# Patient Record
Sex: FEMALE | Race: WHITE | NOT HISPANIC OR LATINO | Employment: FULL TIME | URBAN - NONMETROPOLITAN AREA
[De-identification: names, ages, dates, MRNs, and addresses within clinical notes are randomized per-mention and may not be internally consistent; named-entity substitution may affect disease eponyms.]

---

## 2018-08-08 ENCOUNTER — ANESTHESIA (OUTPATIENT)
Dept: PERIOP | Facility: HOSPITAL | Age: 33
End: 2018-08-08

## 2018-08-08 ENCOUNTER — APPOINTMENT (OUTPATIENT)
Dept: GENERAL RADIOLOGY | Facility: HOSPITAL | Age: 33
End: 2018-08-08

## 2018-08-08 ENCOUNTER — ANESTHESIA EVENT (OUTPATIENT)
Dept: PERIOP | Facility: HOSPITAL | Age: 33
End: 2018-08-08

## 2018-08-08 ENCOUNTER — HOSPITAL ENCOUNTER (INPATIENT)
Facility: HOSPITAL | Age: 33
LOS: 7 days | Discharge: HOME | End: 2018-08-15
Attending: EMERGENCY MEDICINE | Admitting: INTERNAL MEDICINE

## 2018-08-08 DIAGNOSIS — L03.116 CELLULITIS OF LEFT ANKLE: ICD-10-CM

## 2018-08-08 DIAGNOSIS — R73.9 HYPERGLYCEMIA: ICD-10-CM

## 2018-08-08 DIAGNOSIS — A41.9 SEPSIS, DUE TO UNSPECIFIED ORGANISM: Primary | ICD-10-CM

## 2018-08-08 LAB
6-ACETYL MORPHINE: NEGATIVE
ALBUMIN SERPL-MCNC: 4.1 G/DL (ref 3.5–5)
ALBUMIN/GLOB SERPL: 1.1 G/DL (ref 1.5–2.5)
ALP SERPL-CCNC: 110 U/L (ref 35–104)
ALT SERPL W P-5'-P-CCNC: 16 U/L (ref 10–36)
AMPHET+METHAMPHET UR QL: NEGATIVE
ANION GAP SERPL CALCULATED.3IONS-SCNC: 10.7 MMOL/L (ref 3.6–11.2)
AST SERPL-CCNC: 16 U/L (ref 10–30)
B-HCG UR QL: NEGATIVE
BACTERIA UR QL AUTO: ABNORMAL /HPF
BARBITURATES UR QL SCN: NEGATIVE
BASOPHILS # BLD AUTO: 0.07 10*3/MM3 (ref 0–0.3)
BASOPHILS NFR BLD AUTO: 0.3 % (ref 0–2)
BENZODIAZ UR QL SCN: NEGATIVE
BILIRUB SERPL-MCNC: 0.6 MG/DL (ref 0.2–1.8)
BILIRUB UR QL STRIP: NEGATIVE
BUN BLD-MCNC: 9 MG/DL (ref 7–21)
BUN/CREAT SERPL: 14.1 (ref 7–25)
BUPRENORPHINE SERPL-MCNC: NEGATIVE NG/ML
CALCIUM SPEC-SCNC: 9 MG/DL (ref 7.7–10)
CANNABINOIDS SERPL QL: NEGATIVE
CHLORIDE SERPL-SCNC: 102 MMOL/L (ref 99–112)
CLARITY UR: ABNORMAL
CO2 SERPL-SCNC: 22.3 MMOL/L (ref 24.3–31.9)
COCAINE UR QL: NEGATIVE
COLOR UR: ABNORMAL
CREAT BLD-MCNC: 0.64 MG/DL (ref 0.43–1.29)
CRP SERPL-MCNC: 19.24 MG/DL (ref 0–0.99)
D-LACTATE SERPL-SCNC: 1.2 MMOL/L (ref 0.5–2)
DEPRECATED RDW RBC AUTO: 38 FL (ref 37–54)
EOSINOPHIL # BLD AUTO: 0.07 10*3/MM3 (ref 0–0.7)
EOSINOPHIL NFR BLD AUTO: 0.3 % (ref 0–5)
ERYTHROCYTE [DISTWIDTH] IN BLOOD BY AUTOMATED COUNT: 12 % (ref 11.5–14.5)
GFR SERPL CREATININE-BSD FRML MDRD: 106 ML/MIN/1.73
GLOBULIN UR ELPH-MCNC: 3.6 GM/DL
GLUCOSE BLD-MCNC: 275 MG/DL (ref 70–110)
GLUCOSE BLDC GLUCOMTR-MCNC: 232 MG/DL (ref 70–130)
GLUCOSE BLDC GLUCOMTR-MCNC: 307 MG/DL (ref 70–130)
GLUCOSE UR STRIP-MCNC: ABNORMAL MG/DL
HBA1C MFR BLD: 10.6 % (ref 4.5–5.7)
HCT VFR BLD AUTO: 38.9 % (ref 37–47)
HGB BLD-MCNC: 13.3 G/DL (ref 12–16)
HGB UR QL STRIP.AUTO: NEGATIVE
HOLD SPECIMEN: NORMAL
HOLD SPECIMEN: NORMAL
HYALINE CASTS UR QL AUTO: ABNORMAL /LPF
IMM GRANULOCYTES # BLD: 0.08 10*3/MM3 (ref 0–0.03)
IMM GRANULOCYTES NFR BLD: 0.4 % (ref 0–0.5)
KETONES UR QL STRIP: ABNORMAL
LEUKOCYTE ESTERASE UR QL STRIP.AUTO: ABNORMAL
LYMPHOCYTES # BLD AUTO: 2.66 10*3/MM3 (ref 1–3)
LYMPHOCYTES NFR BLD AUTO: 12.6 % (ref 21–51)
MAGNESIUM SERPL-MCNC: 2.2 MG/DL (ref 1.7–2.6)
MCH RBC QN AUTO: 30.2 PG (ref 27–33)
MCHC RBC AUTO-ENTMCNC: 34.2 G/DL (ref 33–37)
MCV RBC AUTO: 88.2 FL (ref 80–94)
METHADONE UR QL SCN: NEGATIVE
MONOCYTES # BLD AUTO: 1.48 10*3/MM3 (ref 0.1–0.9)
MONOCYTES NFR BLD AUTO: 7 % (ref 0–10)
NEUTROPHILS # BLD AUTO: 16.68 10*3/MM3 (ref 1.4–6.5)
NEUTROPHILS NFR BLD AUTO: 79.4 % (ref 30–70)
NITRITE UR QL STRIP: NEGATIVE
OPIATES UR QL: NEGATIVE
OSMOLALITY SERPL CALC.SUM OF ELEC: 278.6 MOSM/KG (ref 273–305)
OXYCODONE UR QL SCN: NEGATIVE
PCP UR QL SCN: NEGATIVE
PH UR STRIP.AUTO: 5.5 [PH] (ref 5–8)
PLATELET # BLD AUTO: 440 10*3/MM3 (ref 130–400)
PMV BLD AUTO: 9.2 FL (ref 6–10)
POTASSIUM BLD-SCNC: 3.9 MMOL/L (ref 3.5–5.3)
PROT SERPL-MCNC: 7.7 G/DL (ref 6–8)
PROT UR QL STRIP: ABNORMAL
RBC # BLD AUTO: 4.41 10*6/MM3 (ref 4.2–5.4)
RBC # UR: ABNORMAL /HPF
REF LAB TEST METHOD: ABNORMAL
SODIUM BLD-SCNC: 135 MMOL/L (ref 135–153)
SP GR UR STRIP: >1.03 (ref 1–1.03)
SQUAMOUS #/AREA URNS HPF: ABNORMAL /HPF
TSH SERPL DL<=0.05 MIU/L-ACNC: 1.03 MIU/ML (ref 0.55–4.78)
UROBILINOGEN UR QL STRIP: ABNORMAL
WBC NRBC COR # BLD: 21.04 10*3/MM3 (ref 4.5–12.5)
WBC UR QL AUTO: ABNORMAL /HPF
WHOLE BLOOD HOLD SPECIMEN: NORMAL
WHOLE BLOOD HOLD SPECIMEN: NORMAL
YEAST URNS QL MICRO: ABNORMAL /HPF

## 2018-08-08 PROCEDURE — 0JBR0ZZ EXCISION OF LEFT FOOT SUBCUTANEOUS TISSUE AND FASCIA, OPEN APPROACH: ICD-10-PCS | Performed by: ORTHOPAEDIC SURGERY

## 2018-08-08 PROCEDURE — 81001 URINALYSIS AUTO W/SCOPE: CPT | Performed by: PHYSICIAN ASSISTANT

## 2018-08-08 PROCEDURE — 71045 X-RAY EXAM CHEST 1 VIEW: CPT | Performed by: RADIOLOGY

## 2018-08-08 PROCEDURE — 86741 NEISSERIA MENINGITIDIS: CPT | Performed by: NURSE PRACTITIONER

## 2018-08-08 PROCEDURE — 25010000002 ONDANSETRON PER 1 MG: Performed by: NURSE ANESTHETIST, CERTIFIED REGISTERED

## 2018-08-08 PROCEDURE — 25010000002 VANCOMYCIN PER 500 MG: Performed by: PHYSICIAN ASSISTANT

## 2018-08-08 PROCEDURE — 80307 DRUG TEST PRSMV CHEM ANLYZR: CPT | Performed by: PHYSICIAN ASSISTANT

## 2018-08-08 PROCEDURE — 83735 ASSAY OF MAGNESIUM: CPT | Performed by: PHYSICIAN ASSISTANT

## 2018-08-08 PROCEDURE — 73610 X-RAY EXAM OF ANKLE: CPT | Performed by: RADIOLOGY

## 2018-08-08 PROCEDURE — 81025 URINE PREGNANCY TEST: CPT | Performed by: PHYSICIAN ASSISTANT

## 2018-08-08 PROCEDURE — 85025 COMPLETE CBC W/AUTO DIFF WBC: CPT | Performed by: PHYSICIAN ASSISTANT

## 2018-08-08 PROCEDURE — 25010000002 VANCOMYCIN PER 500 MG: Performed by: ORTHOPAEDIC SURGERY

## 2018-08-08 PROCEDURE — 86140 C-REACTIVE PROTEIN: CPT | Performed by: PHYSICIAN ASSISTANT

## 2018-08-08 PROCEDURE — 99223 1ST HOSP IP/OBS HIGH 75: CPT | Performed by: INTERNAL MEDICINE

## 2018-08-08 PROCEDURE — 25010000002 PROPOFOL 1000 MG/ML EMULSION: Performed by: NURSE ANESTHETIST, CERTIFIED REGISTERED

## 2018-08-08 PROCEDURE — 87040 BLOOD CULTURE FOR BACTERIA: CPT | Performed by: PHYSICIAN ASSISTANT

## 2018-08-08 PROCEDURE — 87086 URINE CULTURE/COLONY COUNT: CPT | Performed by: PHYSICIAN ASSISTANT

## 2018-08-08 PROCEDURE — 86609 BACTERIUM ANTIBODY: CPT | Performed by: NURSE PRACTITIONER

## 2018-08-08 PROCEDURE — 87070 CULTURE OTHR SPECIMN AEROBIC: CPT | Performed by: ORTHOPAEDIC SURGERY

## 2018-08-08 PROCEDURE — 87186 SC STD MICRODIL/AGAR DIL: CPT | Performed by: ORTHOPAEDIC SURGERY

## 2018-08-08 PROCEDURE — 87147 CULTURE TYPE IMMUNOLOGIC: CPT | Performed by: PHYSICIAN ASSISTANT

## 2018-08-08 PROCEDURE — 25010000002 PIPERACILLIN-TAZOBACTAM: Performed by: PHYSICIAN ASSISTANT

## 2018-08-08 PROCEDURE — 87077 CULTURE AEROBIC IDENTIFY: CPT | Performed by: PHYSICIAN ASSISTANT

## 2018-08-08 PROCEDURE — 99284 EMERGENCY DEPT VISIT MOD MDM: CPT

## 2018-08-08 PROCEDURE — 87077 CULTURE AEROBIC IDENTIFY: CPT | Performed by: ORTHOPAEDIC SURGERY

## 2018-08-08 PROCEDURE — 80053 COMPREHEN METABOLIC PANEL: CPT | Performed by: PHYSICIAN ASSISTANT

## 2018-08-08 PROCEDURE — 93010 ELECTROCARDIOGRAM REPORT: CPT | Performed by: INTERNAL MEDICINE

## 2018-08-08 PROCEDURE — 25010000002 PROPOFOL 10 MG/ML EMULSION: Performed by: NURSE ANESTHETIST, CERTIFIED REGISTERED

## 2018-08-08 PROCEDURE — 71045 X-RAY EXAM CHEST 1 VIEW: CPT

## 2018-08-08 PROCEDURE — 73610 X-RAY EXAM OF ANKLE: CPT

## 2018-08-08 PROCEDURE — 87205 SMEAR GRAM STAIN: CPT | Performed by: PHYSICIAN ASSISTANT

## 2018-08-08 PROCEDURE — 25010000002 FENTANYL CITRATE (PF) 100 MCG/2ML SOLUTION: Performed by: NURSE ANESTHETIST, CERTIFIED REGISTERED

## 2018-08-08 PROCEDURE — 84443 ASSAY THYROID STIM HORMONE: CPT | Performed by: PHYSICIAN ASSISTANT

## 2018-08-08 PROCEDURE — 87147 CULTURE TYPE IMMUNOLOGIC: CPT | Performed by: ORTHOPAEDIC SURGERY

## 2018-08-08 PROCEDURE — 25010000002 CEFTRIAXONE: Performed by: NURSE PRACTITIONER

## 2018-08-08 PROCEDURE — 83605 ASSAY OF LACTIC ACID: CPT | Performed by: PHYSICIAN ASSISTANT

## 2018-08-08 PROCEDURE — 86631 CHLAMYDIA ANTIBODY: CPT | Performed by: NURSE PRACTITIONER

## 2018-08-08 PROCEDURE — 87205 SMEAR GRAM STAIN: CPT | Performed by: ORTHOPAEDIC SURGERY

## 2018-08-08 PROCEDURE — 83036 HEMOGLOBIN GLYCOSYLATED A1C: CPT | Performed by: PHYSICIAN ASSISTANT

## 2018-08-08 PROCEDURE — 93005 ELECTROCARDIOGRAM TRACING: CPT | Performed by: PHYSICIAN ASSISTANT

## 2018-08-08 PROCEDURE — 94799 UNLISTED PULMONARY SVC/PX: CPT

## 2018-08-08 PROCEDURE — 25010000002 MIDAZOLAM PER 1 MG: Performed by: NURSE ANESTHETIST, CERTIFIED REGISTERED

## 2018-08-08 PROCEDURE — 87070 CULTURE OTHR SPECIMN AEROBIC: CPT | Performed by: PHYSICIAN ASSISTANT

## 2018-08-08 PROCEDURE — 87186 SC STD MICRODIL/AGAR DIL: CPT | Performed by: PHYSICIAN ASSISTANT

## 2018-08-08 PROCEDURE — 0J9R00Z DRAINAGE OF LEFT FOOT SUBCUTANEOUS TISSUE AND FASCIA WITH DRAINAGE DEVICE, OPEN APPROACH: ICD-10-PCS | Performed by: ORTHOPAEDIC SURGERY

## 2018-08-08 PROCEDURE — 82962 GLUCOSE BLOOD TEST: CPT

## 2018-08-08 RX ORDER — ONDANSETRON 2 MG/ML
INJECTION INTRAMUSCULAR; INTRAVENOUS AS NEEDED
Status: DISCONTINUED | OUTPATIENT
Start: 2018-08-08 | End: 2018-08-08 | Stop reason: SURG

## 2018-08-08 RX ORDER — SODIUM CHLORIDE 0.9 % (FLUSH) 0.9 %
10 SYRINGE (ML) INJECTION AS NEEDED
Status: DISCONTINUED | OUTPATIENT
Start: 2018-08-08 | End: 2018-08-15 | Stop reason: HOSPADM

## 2018-08-08 RX ORDER — MAGNESIUM HYDROXIDE 1200 MG/15ML
LIQUID ORAL AS NEEDED
Status: DISCONTINUED | OUTPATIENT
Start: 2018-08-08 | End: 2018-08-08 | Stop reason: HOSPADM

## 2018-08-08 RX ORDER — NITROGLYCERIN 0.4 MG/1
0.4 TABLET SUBLINGUAL
Status: DISCONTINUED | OUTPATIENT
Start: 2018-08-08 | End: 2018-08-15 | Stop reason: HOSPADM

## 2018-08-08 RX ORDER — SODIUM CHLORIDE 0.9 % (FLUSH) 0.9 %
1-10 SYRINGE (ML) INJECTION AS NEEDED
Status: DISCONTINUED | OUTPATIENT
Start: 2018-08-08 | End: 2018-08-08 | Stop reason: HOSPADM

## 2018-08-08 RX ORDER — SODIUM CHLORIDE 0.9 % (FLUSH) 0.9 %
1-10 SYRINGE (ML) INJECTION AS NEEDED
Status: DISCONTINUED | OUTPATIENT
Start: 2018-08-08 | End: 2018-08-15 | Stop reason: HOSPADM

## 2018-08-08 RX ORDER — PROPOFOL 10 MG/ML
VIAL (ML) INTRAVENOUS AS NEEDED
Status: DISCONTINUED | OUTPATIENT
Start: 2018-08-08 | End: 2018-08-08 | Stop reason: SURG

## 2018-08-08 RX ORDER — SODIUM CHLORIDE 9 MG/ML
75 INJECTION, SOLUTION INTRAVENOUS CONTINUOUS
Status: DISCONTINUED | OUTPATIENT
Start: 2018-08-08 | End: 2018-08-11

## 2018-08-08 RX ORDER — SODIUM CHLORIDE, SODIUM LACTATE, POTASSIUM CHLORIDE, CALCIUM CHLORIDE 600; 310; 30; 20 MG/100ML; MG/100ML; MG/100ML; MG/100ML
125 INJECTION, SOLUTION INTRAVENOUS CONTINUOUS
Status: DISCONTINUED | OUTPATIENT
Start: 2018-08-08 | End: 2018-08-09

## 2018-08-08 RX ORDER — DEXTROSE MONOHYDRATE 25 G/50ML
25 INJECTION, SOLUTION INTRAVENOUS
Status: DISCONTINUED | OUTPATIENT
Start: 2018-08-08 | End: 2018-08-15 | Stop reason: HOSPADM

## 2018-08-08 RX ORDER — MIDAZOLAM HYDROCHLORIDE 1 MG/ML
INJECTION INTRAMUSCULAR; INTRAVENOUS AS NEEDED
Status: DISCONTINUED | OUTPATIENT
Start: 2018-08-08 | End: 2018-08-08 | Stop reason: SURG

## 2018-08-08 RX ORDER — NICOTINE POLACRILEX 4 MG
15 LOZENGE BUCCAL
Status: DISCONTINUED | OUTPATIENT
Start: 2018-08-08 | End: 2018-08-15 | Stop reason: HOSPADM

## 2018-08-08 RX ORDER — FENTANYL CITRATE 50 UG/ML
INJECTION, SOLUTION INTRAMUSCULAR; INTRAVENOUS AS NEEDED
Status: DISCONTINUED | OUTPATIENT
Start: 2018-08-08 | End: 2018-08-08 | Stop reason: SURG

## 2018-08-08 RX ORDER — HEPARIN SODIUM 5000 [USP'U]/ML
5000 INJECTION, SOLUTION INTRAVENOUS; SUBCUTANEOUS EVERY 12 HOURS SCHEDULED
Status: DISCONTINUED | OUTPATIENT
Start: 2018-08-08 | End: 2018-08-15 | Stop reason: HOSPADM

## 2018-08-08 RX ADMIN — FENTANYL CITRATE 100 MCG: 50 INJECTION INTRAMUSCULAR; INTRAVENOUS at 18:32

## 2018-08-08 RX ADMIN — MIDAZOLAM HYDROCHLORIDE 2 MG: 1 INJECTION, SOLUTION INTRAMUSCULAR; INTRAVENOUS at 18:32

## 2018-08-08 RX ADMIN — ONDANSETRON 4 MG: 2 INJECTION, SOLUTION INTRAMUSCULAR; INTRAVENOUS at 18:32

## 2018-08-08 RX ADMIN — PROPOFOL 100 MCG/KG/MIN: 10 INJECTION, EMULSION INTRAVENOUS at 18:36

## 2018-08-08 RX ADMIN — PROPOFOL 40 MG: 10 INJECTION, EMULSION INTRAVENOUS at 18:36

## 2018-08-08 RX ADMIN — SODIUM CHLORIDE 125 ML/HR: 9 INJECTION, SOLUTION INTRAVENOUS at 14:48

## 2018-08-08 RX ADMIN — CEFTRIAXONE 2 G: 2 INJECTION, POWDER, FOR SOLUTION INTRAMUSCULAR; INTRAVENOUS at 16:22

## 2018-08-08 RX ADMIN — PIPERACILLIN SODIUM,TAZOBACTAM SODIUM 3.38 G: 3; .375 INJECTION, POWDER, FOR SOLUTION INTRAVENOUS at 12:13

## 2018-08-08 RX ADMIN — SODIUM CHLORIDE, POTASSIUM CHLORIDE, SODIUM LACTATE AND CALCIUM CHLORIDE: 600; 310; 30; 20 INJECTION, SOLUTION INTRAVENOUS at 18:32

## 2018-08-08 RX ADMIN — VANCOMYCIN HYDROCHLORIDE 1500 MG: 5 INJECTION, POWDER, LYOPHILIZED, FOR SOLUTION INTRAVENOUS at 12:49

## 2018-08-08 NOTE — ED NOTES
Pt reports that she noticed some swelling to her left ankle on Sunday, went to [facility], had the area lanced and was given an antibiotic injection.  Pt reports that she is having increased swelling, pain, tenderness, erythema and warmth to the area and [facility] and they sent her here.  Pt has open area the left ankle that is draining yellow tinged drainage.  Has redness, warmth to the area with swelling.  Has an area posterior to the open area that has a fluid filled blister with blackened area under blister region.  Pt reports that she is having increased pain.       Claudia Sprague RN  08/08/18 0553

## 2018-08-08 NOTE — PLAN OF CARE
Problem: Patient Care Overview  Goal: Plan of Care Review  Outcome: Ongoing (interventions implemented as appropriate)    Goal: Individualization and Mutuality  Outcome: Ongoing (interventions implemented as appropriate)    Goal: Discharge Needs Assessment  Outcome: Ongoing (interventions implemented as appropriate)    Goal: Interprofessional Rounds/Family Conf  Outcome: Ongoing (interventions implemented as appropriate)      Problem: Pain, Acute (Adult)  Goal: Identify Related Risk Factors and Signs and Symptoms  Outcome: Ongoing (interventions implemented as appropriate)    Goal: Acceptable Pain Control/Comfort Level  Outcome: Ongoing (interventions implemented as appropriate)      Problem: Wound (Includes Pressure Injury) (Adult)  Goal: Signs and Symptoms of Listed Potential Problems Will be Absent, Minimized or Managed (Wound)  Outcome: Ongoing (interventions implemented as appropriate)      Problem: Skin Injury Risk (Adult)  Goal: Identify Related Risk Factors and Signs and Symptoms  Outcome: Ongoing (interventions implemented as appropriate)    Goal: Skin Health and Integrity  Outcome: Ongoing (interventions implemented as appropriate)

## 2018-08-08 NOTE — ED NOTES
Dressing applied to left ankle per Panfilo Vargas. 4X4 gauze applied and wrapped in kerlix.      Do Barrera, RN  08/08/18 1923

## 2018-08-08 NOTE — ED NOTES
Report gave to Nia BHAT on 3N at this time     Do Barrera RN  08/08/18 1320       Do Barrera, HOME  08/08/18 3517

## 2018-08-08 NOTE — CONSULTS
INFECTIOUS DISEASE CONSULTATION REPORT      Referring Provider: Dr. Tripathi  Reason for Consultation: Sepsis with possible abscess left ankle      Principal problem: <principal problem not specified>    Subjective .     History of present illness:    As you well know Dr. Tripathi, MsGely Ríos is a 33 y.o. female with past medical history not significant, who presented to [facility] on 8/8/2018 for worsening left foot edema, erythema, fever, chills.  Patient states that she went to a local urgent care center yesterday for ankle pain, edema, erythema and feeling really bad.  Left ankle outer aspect noted with open ulceration status post removal of scab.  Ankle and foot and toes with significant erythema, edema and warmth with limited range of motion.  Significant area aside from previous mention noted to have possibly been blister but is drained now with discoloration underneath.  Patient's foot is very painful to touch.  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  She also complains of mid back pain.  She denies hematuria or dysuria.    Infectious Disease consultation was requested for antimicrobial management.     History taken from: patient chart RN    Case was discussed with patient, nursing staff, primary care team and consulting provider    Subjective         Review of Systems     Constitutional: No fever, chills and night sweats. No appetite change or unexpected weight change. No fatigue.  Eyes: No eye drainage, itching or redness.  HEENT: No mouth sores, dysphagia or nose bleed.  Respiratory: No for shortness of breath, cough or production of sputum.  Cardiovascular: No chest pain, no palpitations, no orthopnea.  Gastrointestinal: No nausea, vomiting or diarrhea. No abdominal pain, hematemesis or rectal bleeding.  Genitourinary: No dysuria or polyuria.  Hematologic/lymphatic: No lymph  "node abnormalities, no easy bruising or easy bleeding.  Musculoskeletal: No muscle or joint pain.  Skin: No rash and no itching.  Neurological: No loss of consciousness, no seizure, no headache.  Behavioral/Psych: No depression or suicidal ideation.  Endocrine: No hot flashes.  Immunologic: Negative.    Past Medical History    History reviewed. No pertinent past medical history.    Past Surgical History    No past surgical history on file.    Family History    History reviewed. No pertinent family history.        Social History    Social History   Substance Use Topics   • Smoking status: Never Smoker   • Smokeless tobacco: Never Used   • Alcohol use Yes      Comment: a couple a month        Allergies    Patient has no known allergies.        Objective         Objective     /87 (BP Location: Left arm, Patient Position: Lying)   Pulse 116   Temp 99.8 °F (37.7 °C) (Oral)   Resp 20   Ht 162.6 cm (64\")   Wt 79 kg (174 lb 1.6 oz)   SpO2 99%   BMI 29.88 kg/m²     Temp:  [99.8 °F (37.7 °C)-100.4 °F (38 °C)] 99.8 °F (37.7 °C)      No intake or output data in the 24 hours ending 08/08/18 1521      Physical Exam:      General Appearance:    Alert, fever, chills and malaise    Head:    Normocephalic, without obvious abnormality, atraumatic   Eyes:            Lids and lashes normal, conjunctivae and sclerae normal, no   icterus, no pallor, corneas clear, PERRLA   Ears:    Ears appear intact with no abnormalities noted   Throat:   No oral lesions, no thrush, oral mucosa moist   Neck:   No adenopathy, supple, trachea midline, no thyromegaly, no   carotid bruit, no JVD   Back:     No tenderness to percussion or palpation, range of motion   normal   Lungs:     Clear to auscultation,respirations regular, even and           unlabored. No wheezing, no ronchi and no crackles.    Heart:    Regular rhythm and normal rate, normal S1 and S2, no            murmur, no gallop, no rub, no click   Chest Wall:    No abnormalities " observed   Abdomen:     Normal bowel sounds, no masses, no organomegaly, soft        non-tender, non-distended, no guarding, no rebound                tenderness   Rectal:     Deferred   Extremities:   Moves all extremities well, no edema, no cyanosis, no             redness   Pulses:   Pulses palpable and equal bilaterally   Skin:   Left ankle outer aspect noted with open ulceration status post removal of scab.  Ankle and foot and toes with significant erythema, edema and warmth with limited range of motion.  Significant area aside from previous mention noted to have possibly been blister but is drained now with discoloration underneath.    Lymph nodes:   No palpable adenopathy   Neurologic:   Awake, alert and oriented x 3. Following commands.       Results:      Results from last 7 days  Lab Units 08/08/18  1153   WBC 10*3/mm3 21.04*     Lab Results   Component Value Date    NEUTROABS 16.68 (H) 08/08/2018         Results from last 7 days  Lab Units 08/08/18  1153   CREATININE mg/dL 0.64         Results from last 7 days  Lab Units 08/08/18  1153   CRP mg/dL 19.24*       Imaging Results (last 24 hours)     Procedure Component Value Units Date/Time    XR Chest 1 View [[id number]] Collected:  08/08/18 1242     Updated:  08/08/18 1250    Narrative:       EXAMINATION: XR CHEST 1 VW-      CLINICAL INDICATION:     Simple sepsis protocol     TECHNIQUE:  XR CHEST 1 VW-      COMPARISON: NONE      FINDINGS:        Lungs are aerated.   Heart size, mediastinum, and pulmonary vascularity are unremarkable.   No pneumothorax.   No effusions.   No acute osseous findings.            Impression:       No radiographic evidence of acute cardiac or pulmonary  disease.     This report was finalized on 8/8/2018 12:42 PM by Dr. Erlin Chong MD.       XR Ankle 3+ View Left [[id number]] Collected:  08/08/18 1242     Updated:  08/08/18 1250    Narrative:       EXAMINATION: XR ANKLE 3+ VW LEFT-      CLINICAL INDICATION:infection         COMPARISON: None      TECHNIQUE: 3 views left ankle     FINDINGS:   Marked degree of soft tissue swelling overlying the distal fibula. No  dystrophic calcifications identified. No periosteal reaction is noted.  No evidence of ankle effusion.           Impression:       Extensive soft tissue swelling overlying the lateral malleolus. No  destructive osseous changes identified.     This report was finalized on 8/8/2018 12:43 PM by Dr. Erlin Chong MD.               Cultures:         Results Review:    I have personally reviewed laboratory data, culture results, radiology studies and antimicrobial therapy.    Hospital Medications (active)       Dose Frequency Start End    dextrose (D50W) solution 25 g 25 g Every 15 Minutes PRN 8/8/2018     Sig - Route: Infuse 50 mL into a venous catheter Every 15 (Fifteen) Minutes As Needed for Low Blood Sugar (Blood Sugar Less Than 70). - Intravenous    Cosign for Ordering: Required by Manolo Tripathi MD    dextrose (GLUTOSE) oral gel 15 g 15 g Every 15 Minutes PRN 8/8/2018     Sig - Route: Take 15 g by mouth Every 15 (Fifteen) Minutes As Needed for Low Blood Sugar (Blood sugar less than 70). - Oral    Cosign for Ordering: Required by Manolo Tripathi MD    glucagon (human recombinant) (GLUCAGEN DIAGNOSTIC) injection 1 mg 1 mg As Needed 8/8/2018     Sig - Route: Inject 1 mg under the skin into the appropriate area as directed As Needed (Blood Glucose Less Than 70). - Subcutaneous    Cosign for Ordering: Required by Manolo Tripathi MD    heparin (porcine) 5000 UNIT/ML injection 5,000 Units 5,000 Units Every 12 Hours Scheduled 8/8/2018     Sig - Route: Inject 1 mL under the skin into the appropriate area as directed Every 12 (Twelve) Hours. - Subcutaneous    Cosign for Ordering: Required by Manolo Tripathi MD    nitroglycerin (NITROSTAT) SL tablet 0.4 mg 0.4 mg Every 5 Minutes PRN 8/8/2018     Sig - Route: Place 1 tablet under the tongue Every 5 (Five) Minutes As Needed for Chest Pain  (if systolic BP greater than 100 mm/Hg.). - Sublingual    Cosign for Ordering: Required by Manolo Tripathi MD    piperacillin-tazobactam (ZOSYN) 3.375 g/100 mL 0.9% NS IVPB (mbp) 3.375 g Once 8/8/2018 8/8/2018    Sig - Route: Infuse 100 mL into a venous catheter 1 (One) Time. - Intravenous    Cosign for Ordering: Accepted by Placido Holland MD on 8/8/2018  1:14 PM    piperacillin-tazobactam (ZOSYN) 3.375 g/100 mL 0.9% NS IVPB (mbp) 3.375 g Every 8 Hours 8/8/2018 8/15/2018    Sig - Route: Infuse 100 mL into a venous catheter Every 8 (Eight) Hours. - Intravenous    sodium chloride 0.9 % flush 1-10 mL 1-10 mL As Needed 8/8/2018     Sig - Route: Infuse 1-10 mL into a venous catheter As Needed for Line Care. - Intravenous    Cosign for Ordering: Required by Manolo Tripathi MD    sodium chloride 0.9 % flush 10 mL 10 mL As Needed 8/8/2018     Sig - Route: Infuse 10 mL into a venous catheter As Needed for Line Care. - Intravenous    Cosign for Ordering: Accepted by Placido Holland MD on 8/8/2018  1:14 PM    sodium chloride 0.9 % infusion 125 mL/hr Continuous 8/8/2018     Sig - Route: Infuse 125 mL/hr into a venous catheter Continuous. - Intravenous    Cosign for Ordering: Required by Manolo Tripathi MD    vancomycin (VANCOCIN) 1,250 mg in sodium chloride 0.9 % 250 mL IVPB 1,250 mg Every 12 Hours 8/9/2018 8/15/2018    Sig - Route: Infuse 1,250 mg into a venous catheter Every 12 (Twelve) Hours. - Intravenous    vancomycin (VANCOCIN) 1,500 mg in sodium chloride 0.9 % 500 mL IVPB 20 mg/kg × 79.4 kg Once 8/8/2018     Sig - Route: Infuse 1,500 mg into a venous catheter 1 (One) Time. - Intravenous    Cosign for Ordering: Accepted by Placido Holland MD on 8/8/2018  1:14 PM    Pharmacy Consult - Pharmacy to dose (Discontinued)  Continuous PRN 8/8/2018 8/8/2018    Sig - Route: Continuous As Needed for Consult. - Does not apply    Reason for Discontinue: Dose adjustment    Cosign for Ordering: Required by Manolo Tripathi  MD [facility]    Pharmacy Consult - Pharmacy to dose (Discontinued)  Continuous PRN 8/8/2018 8/8/2018    Sig - Route: Continuous As Needed for Consult. - Does not apply    Reason for Discontinue: Dose adjustment    Cosign for Ordering: Required by Manolo Tripathi MD            ASSESSMENT/PLAN           Assessment/Plan     ASSESSMENT:    1.  Septic arthritis versus severe cellulitis  2.  Lower extremity abscess    PLAN:     33 y.o. female with past medical history not significant, who presented to [facility] Emergency Department on 8/8/2018 for worsening left foot edema, erythema, fever, chills.  Patient states that she went to a local urgent care center yesterday for ankle pain, edema, erythema and feeling really bad.  Left ankle outer aspect noted with open ulceration status post removal of scab.  Ankle and foot and toes with significant erythema, edema and warmth with limited range of motion.  Significant area aside from previous mention noted to have possibly been blister but is drained now with discoloration underneath.  Patient's foot is very painful to touch.  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  She also complains of mid back pain.  She denies hematuria or dysuria.    This is very concerning for septic arthritis due to decreased range of motion and very elevated CRP and white count most consistent with deep seated infection.    Differential diagnosis could be gonococcal arthritis  or inflammatory reactive arthritis.     Antibiotic therapy was changed to high-dose Rocephin every 24 hours and continue with vancomycin.    Zosyn was discontinued.    GC chlamydia, gonorrhea serologies.    CRP in the a.m.    Will continue to follow closely.      If patient has I&D please obtain intraoperative cultures for better identification and antibiotic treatment, thank you.      Current antimicrobials:    Rocephin 2 g IV  daily    Vancomycin pharmacy to dose      Patient's findings and recommendations were discussed with patient, nursing staff, primary care team and consulting provider    Code Status:   Code Status and Medical Interventions:   Ordered at: 08/08/18 1320     Code Status:    CPR     Medical Interventions (Level of Support Prior to Arrest):    Full       RADHA Araiza  08/08/18  3:21 PM

## 2018-08-08 NOTE — CONSULTS
Consults    Patient Identification:  Name:  Cynthia Ríos  Age:  33 y.o.  Sex:  female  :  1985  MRN:  [id number]  Visit Number:  [id number]  Primary care provider:  Provider, No Known    History of presenting illness: 33-year-old female was evaluated in consult today for left ankle abscess and cellulitis.  Patient explains on  she noticed that her ankle was getting swollen and Monday it was even more swollen and red and had scabbed area.  Patient explains she went to urgent care they picked a scab done a culture.  Patient explains that continue to worsen and she came to the emergency department has been admitted patient is nothing by mouth today.  Patient denies any injury states that she is a insurance claims reviewer and work site at [employer] and may have been bit by an insect tear.  Patient states that she is moving and may have hit it or injured it at that time.  Patient also states that she has been doing a lot with mother at nursing home and may have contacted something at the nursing home.  Explains that she has been running fever have been taken over-the-counter ibuprofen.  Patient states that she got IV antibiotics in the emergency department and is currently getting vancomycin.  Patient denies any previous injuries to the left ankle states that she had 1 abscess in the past when she was 16 years old which did not need lanced.  ---------------------------------------------------------------------------------------------------------------------  Review of Systems     Constitutional - Functions of daily living impaired  HEENT - Denies headache, blurred vision, hearing loss  Cardiovascular - Denies chest pain  Resp - Denies shortness of breath  GI - Denies reflux   -Denies hematuria  Skin - Redness and abscess to left ankle  Hematology - States fever  Endocrine - Denies polyuria and polydipsia  Musculoskeletal -  Pain to left ankle  Psych - Denies  depression  ---------------------------------------------------------------------------------------------------------------------   Past History:  History reviewed. No pertinent family history.  History reviewed. No pertinent past medical history.  No past surgical history on file.  Social History     Social History   • Marital status: Single     Social History Main Topics   • Smoking status: Never Smoker   • Smokeless tobacco: Never Used   • Alcohol use Yes      Comment: a couple a month    • Drug use: No   • Sexual activity: Defer     Other Topics Concern   • Not on file     ---------------------------------------------------------------------------------------------------------------------   Allergies:  Patient has no known allergies.  ---------------------------------------------------------------------------------------------------------------------   Prior to Admission Medications     None        Hospital Meds:    heparin (porcine) 5,000 Units Subcutaneous Q12H   vancomycin 20 mg/kg Intravenous Once       Pharmacy Consult - Pharmacy to dose    Pharmacy Consult - Pharmacy to dose    sodium chloride 125 mL/hr     ---------------------------------------------------------------------------------------------------------------------   Vital Signs:  Temp:  [99.8 °F (37.7 °C)-100.4 °F (38 °C)] 99.8 °F (37.7 °C)  Heart Rate:  [115-116] 116  Resp:  [18-20] 20  BP: (125-147)/(78-92) 139/87  1    08/08/18  1044 08/08/18  1425   Weight: 79.4 kg (175 lb) 79 kg (174 lb 1.6 oz)     Body mass index is 29.88 kg/m².  ---------------------------------------------------------------------------------------------------------------------   Physical exam:  Constitutional:  Well-developed and well-nourished.  No respiratory distress.      HENT:  Head: Normocephalic and atraumatic.  Mouth:  Moist mucous membranes.    Eyes:  Conjunctivae and EOM are normal.  Pupils are equal, round, and reactive to light.  No scleral icterus.  Neck:   Neck supple.  No JVD present.    Cardiovascular:  Normal rate, regular rhythm and normal heart sounds with no murmur.  Pulmonary/Chest:  No respiratory distress, no wheezes, no crackles, with normal breath sounds and good air movement.  Abdominal:  Soft.  Bowel sounds are normal.  No distension and no tenderness.   Musculoskeletal:  Left ankle and foot swollen decreased range of motion left ankle secondary to pains.    Neurological:  Alert and oriented to person, place, and time.  No cranial nerve deficit.  No tongue deviation.  No facial droop.  No slurred speech.   Skin:  Erythema noted to the left lateral ankle blackened blister noted to the left lateral ankle approximately a 2 cm diameter ulceration noted to the anterior lateral left ankle patella tinged drainage was noted from wound site.    Psychiatric:  Normal mood and affect.  Behavior is normal.  Judgment and thought content normal.   Peripheral vascular:  No edema and strong pulses on all 4 extremities.    ---------------------------------------------------------------------------------------------------------------------   .  ---------------------------------------------------------------------------------------------------------------------     Results from last 7 days  Lab Units 08/08/18  1153   CRP mg/dL 19.24*   LACTATE mmol/L 1.2   WBC 10*3/mm3 21.04*   HEMOGLOBIN g/dL 13.3   HEMATOCRIT % 38.9   MCV fL 88.2   MCHC g/dL 34.2   PLATELETS 10*3/mm3 440*           Results from last 7 days  Lab Units 08/08/18  1153   SODIUM mmol/L 135   POTASSIUM mmol/L 3.9   CHLORIDE mmol/L 102   CO2 mmol/L 22.3*   BUN mg/dL 9   CREATININE mg/dL 0.64   EGFR IF NONAFRICN AM mL/min/1.73 106   CALCIUM mg/dL 9.0   GLUCOSE mg/dL 275*   ALBUMIN g/dL 4.10   BILIRUBIN mg/dL 0.6   ALK PHOS U/L 110*   AST (SGOT) U/L 16   ALT (SGPT) U/L 16   Estimated Creatinine Clearance: 124.7 mL/min (by C-G formula based on SCr of 0.64 mg/dL).  No results found for: AMMONIA          No results  found for: HGBA1C  No results found for: TSH, FREET4  Lab Results   Component Value Date    PREGTESTUR Negative 08/08/2018     Pain Management Panel     Pain Management Panel Latest Ref Rng & Units 8/8/2018    AMPHETAMINES SCREEN, URINE Negative Negative    BARBITURATES SCREEN Negative Negative    BENZODIAZEPINE SCREEN, URINE Negative Negative    BUPRENORPHINE Negative Negative    COCAINE SCREEN, URINE Negative Negative    METHADONE SCREEN, URINE Negative Negative                        ---------------------------------------------------------------------------------------------------------------------   Imaging Results (last 7 days)     Procedure Component Value Units Date/Time    XR Chest 1 View [[id number]] Collected:  08/08/18 1242     Updated:  08/08/18 1250    Narrative:       EXAMINATION: XR CHEST 1 VW-      CLINICAL INDICATION:     Simple sepsis protocol     TECHNIQUE:  XR CHEST 1 VW-      COMPARISON: NONE      FINDINGS:        Lungs are aerated.   Heart size, mediastinum, and pulmonary vascularity are unremarkable.   No pneumothorax.   No effusions.   No acute osseous findings.            Impression:       No radiographic evidence of acute cardiac or pulmonary  disease.     This report was finalized on 8/8/2018 12:42 PM by Dr. Erlin Chong MD.       XR Ankle 3+ View Left [[id number]] Collected:  08/08/18 1242     Updated:  08/08/18 1250    Narrative:       EXAMINATION: XR ANKLE 3+ VW LEFT-      CLINICAL INDICATION:infection        COMPARISON: None      TECHNIQUE: 3 views left ankle     FINDINGS:   Marked degree of soft tissue swelling overlying the distal fibula. No  dystrophic calcifications identified. No periosteal reaction is noted.  No evidence of ankle effusion.           Impression:       Extensive soft tissue swelling overlying the lateral malleolus. No  destructive osseous changes identified.     This report was finalized on 8/8/2018 12:43 PM by Dr. Erlin Chong MD.            ----------------------------------------------------------------------------------------------------------------------  Assessment and Plan: Left ankle abscess and cellulitis - Pt is to maintain NPO status at this time.  Dr. Artis will evaluate and make further treatment plan.  Pt is to cont. Current antibiotic treatments    Thank you for the consult.    Everton Rosado, RADHA  08/08/18  2:34 PM

## 2018-08-08 NOTE — PROGRESS NOTES
Pharmacokinetics Service Note:    Ms. Ríos has been evaluated for vancomycin and Zosyn dosing to treat her sepsis due to L ankle cellulitis.  Sh received 3.375 gm Zosyn around 1215 and 1.5 gm vancomycin in the ED around 1245.  Based on her estimated normal renal function and demographics, will continue dosing at 1.25 gm q12hrs to target troughs = 15-20 mg/L.  Will obtain a trough level at steady state to determine if any adjustments are needed.    Will continue Zosyn at 3.375 gm q8hrs per extended infusion dosing protocol.      Thank you.  Lucía Nunez, Pharm.D.  8/8/2018  2:51 PM

## 2018-08-08 NOTE — ED PROVIDER NOTES
Subjective   33-year-old white female who presents secondary to redness and swelling of her left lateral ankle.  She states that she developed symptoms on Sunday.  She is that she's had subjective fever.  She also developed diarrhea.  She has had some lower back pain.  She denies any abdominal pain.  She denies ever having an injury.  No similar episodes.             Review of Systems   Constitutional: Negative.  Negative for fever.   HENT: Negative.    Respiratory: Negative.    Cardiovascular: Negative.  Negative for chest pain.   Gastrointestinal: Negative.  Negative for abdominal pain.   Endocrine: Negative.    Genitourinary: Negative.  Negative for dysuria.   Skin: Negative.    Neurological: Negative.    Psychiatric/Behavioral: Negative.    All other systems reviewed and are negative.      History reviewed. No pertinent past medical history.    No Known Allergies    No past surgical history on file.    History reviewed. No pertinent family history.    Social History     Social History   • Marital status: Single     Social History Main Topics   • Smoking status: Never Smoker   • Smokeless tobacco: Never Used   • Alcohol use Yes      Comment: a couple a month    • Drug use: No   • Sexual activity: Defer     Other Topics Concern   • Not on file           Objective   Physical Exam   Constitutional: She is oriented to person, place, and time. She appears well-developed and well-nourished. No distress.   HENT:   Head: Normocephalic and atraumatic.   Right Ear: External ear normal.   Left Ear: External ear normal.   Nose: Nose normal.   Eyes: Pupils are equal, round, and reactive to light. Conjunctivae and EOM are normal.   Neck: Normal range of motion. Neck supple. No JVD present. No tracheal deviation present.   Cardiovascular: Normal rate, regular rhythm and normal heart sounds.    No murmur heard.  Pulmonary/Chest: Effort normal and breath sounds normal. No respiratory distress. She has no wheezes.   Abdominal:  Soft. Bowel sounds are normal. There is no tenderness.   Musculoskeletal: Normal range of motion. She exhibits no edema or deformity.   Patient has swelling with redness and an open area that is draining to the lateral aspect of her left ankle.  She also has an area of darkened tissue posterior to this.  There is loose blister formation around the site.  She is neurovascularly intact.   Neurological: She is alert and oriented to person, place, and time. No cranial nerve deficit.   Skin: Skin is warm and dry. No rash noted. She is not diaphoretic. No erythema. No pallor.   Psychiatric: She has a normal mood and affect. Her behavior is normal. Thought content normal.   Nursing note and vitals reviewed.      Procedures           ED Course  ED Course as of Aug 08 1440   Wed Aug 08, 2018   1258 D/w Dr Sanchez- admit to hospitalist, will consult.  [JI]   1306 D/w Dr Tripathi- medical admit  [JI]      ED Course User Index  [JI] Corky Vargas PA                  MDM  Number of Diagnoses or Management Options  Cellulitis of left ankle: new and requires workup  Hyperglycemia: new and requires workup  Sepsis, due to unspecified organism (CMS/HCC): new and requires workup     Amount and/or Complexity of Data Reviewed  Clinical lab tests: reviewed and ordered  Tests in the radiology section of CPT®: reviewed and ordered  Discuss the patient with other providers: yes  Independent visualization of images, tracings, or specimens: yes    Risk of Complications, Morbidity, and/or Mortality  Presenting problems: moderate          Final diagnoses:   Sepsis, due to unspecified organism (CMS/HCC)   Cellulitis of left ankle   Hyperglycemia            Corky Vargas PA  08/08/18 1440

## 2018-08-08 NOTE — ANESTHESIA PREPROCEDURE EVALUATION
Anesthesia Evaluation                  Airway   Mallampati: I  TM distance: >3 FB  Neck ROM: full  No difficulty expected  Dental - normal exam     Pulmonary - normal exam   Cardiovascular - normal exam        Neuro/Psych  GI/Hepatic/Renal/Endo      Musculoskeletal     (+) joint swelling,       ROS comment: Ankle abcess  Abdominal  - normal exam    Bowel sounds: normal.   Substance History      OB/GYN          Other                      Anesthesia Plan    ASA 2     general     intravenous induction   Anesthetic plan and risks discussed with patient.    Plan discussed with CRNA.

## 2018-08-08 NOTE — OP NOTE
Cynthia Ríos  8/8/2018      Operative Progress Note:     Surgeon and Assistant: Dr. Artis     Pre-Operative Diagnosis: Cellulitis of left ankle [L03.116]     Post-Operative Diagnosis: Large abscess lateral side left ankle.  Possible spider bite.     Procedure(s):  I&D, excisional debridement of skin and soft tissue     Type of Anesthesia Administered: Choice     Estimated Blood Loss: 5 cc     Blood Products: None     Specimen Obtained/Removed: See Nursing Record     Complication(s):  None     Graft/Implant/Prosthetics/Implanted Device/Transplants: See Operative Report      Operative Report:  Patient under general anesthesia, prepping and draping of her left lower extremity.  Tourniquet up at 300 mmHg.  Longitudinal incision on top of the distal lateral malleolus.  Large amount of pus came out that we aspirate with a specimen trap.  I excised with the scalpel and the pickups an area of 3 cm x 2 cm necrotic skin.  And then I used the rongeur and debrided all the necrotic subcutaneous tissue down to the bone.  No evidence of contamination of the ankle joint.  No evidence of osteomyelitis.  I also debrided  what appear to be the entry point  1 cm x 1 cm just anterior to my incision.  I used the jet lavage to clean the area.  Under the skin and subcutaneous area that I debrided measure approximately 8 cm x 6 cm. I sent all the material to microbiology for culture sensitivity.  Then I applied a VAC pump dressing.  That is connected to suction.  We sealed it good.Tourniquet down, good cap refill.  Estimated blood loss 5 cc.  Final.       Electronically Signed by: Abraham Stone MD Date/Time: 8/8/2018 7:25 PM

## 2018-08-08 NOTE — ED NOTES
Pt resting quietly on stretcher with no complaints.  Pt AAOx4 with no resp distress noted, respirations even and unlabored.  Pt denies any needs at this time.  Skin PWD. Will continue to monitor and follow plan of care.  Bed rails up x2, bed in lowest position, call light in reach.           Do Barrera, RN  08/08/18 1655

## 2018-08-08 NOTE — ED NOTES
Pt resting quietly on stretcher with no complaints.  Pt AAOx4 with no resp distress noted, respirations even and unlabored.  Pt denies any needs at this time.  Skin PWD. Will continue to monitor and follow plan of care.  Bed rails up x2, bed in lowest position, call light in reach.           Do Barrera, RN  08/08/18 1759

## 2018-08-08 NOTE — ED NOTES
Went to go take pt  Up to her room. Hospitalist is in there at this time. Will go back     Kena Ren  08/08/18 6742

## 2018-08-08 NOTE — H&P
"     [facility] HOSPITALIST HISTORY AND PHYSICAL    Patient Identification:  Name:  Cynthia Ríos  Age:  33 y.o.  Sex:  female  :  1985  MRN:  [id number]   Visit Number:  [id number]  Primary Care Physician:  Provider, No Known     2018 10:48 AM     I have interviewed and examined the patient, concur with the physician assistant findings and this note also constitutes my findings.    Assisted by:Nia BHAT    Subjective     Chief complaint:   Chief Complaint   Patient presents with   • Abscess     History of presenting illness:   Patient is a 33 y.o. female with past medical history significant for seizure disorder as a child/young adult which she reportedly outgrew. She presented to the emergency department of [facility] on 2018 with complaints of swelling, pain, and redness of her left ankle. She states that on , 18, she began having redness and swelling in the left ankle. She denies having any injury or ingrown hairs in the area. No prior history of orthopedic surgery or foreign body in the area. She has had subjective fever as well as chilling and diaphoresis. She has been taking ibuprofen at home for fever. No chest pains or shortness of breath. She has had diarrhea for the last few weeks of watery stools. No blood or black stools.  She has been under high stress with moving, job, and her mother has been in the hospital.  She has some generalized abdominal discomfort, worse in the left upper quadrant. No nausea or vomiting. She has had some central and left sided soreness in her back. No neck stiffness, phonophobia, or photophobia. No dysuria or hematuria.     She states the left ankle \"just Stings\"with attempted movement.  If she is lying still pain is 0/10.  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  She has had subjective fever symptoms associated with this.  She states she has no polyuria polydipsia or " polyphagia, diet however is suspect, diabetes does run in her family    She went to a local urgent care center yesterday for the ankle and feeling poorly. A scab had formed which was removed at the urgent care center. She reports that they were unable to get any drainage from the wound. She was given an IM injection of Rocephin and an oral antibiotic was sent in to her pharmacy, which she had planned to fill today. When she awoke this morning, the ankle had increased swelling and redness, taking her back to the urgent care center. She was then sent to the ED for further evaluation and therapy.     Upon arrival to the ED, vitals were /88, , RR 18, SpO2 97%, and temperature 100.4F. CMP shows a glucose of 275, CO2 22.3, alkaline phosphatase 110, and is otherwise unremarkable. CRP is elevated at 19.24. Lactic acid is normal at 1.2. CBC shows a WBC count of 21.04, hemoglobin 13.3, hematocrit 38.9, and platelets at 440. UA is a dirty specimen and is listed below.  UDS is negative. CXR is unremarkable. XR of the left ankle shows extensive soft tissue swelling overlying the lateral malleolus. No destructive osseous changes and no evidence of ankle effusion noted. Patient has been admitted to the telemetry unit of [facility] for further evaluation and therapy.   ---------------------------------------------------------------------------------------------------------------------   Review of Systems   Constitutional: Positive for chills, diaphoresis, fatigue and fever.   HENT: Negative for congestion, sinus pain and sinus pressure.         Denies phonophobia.    Eyes: Negative for photophobia.   Respiratory: Negative for cough, shortness of breath and wheezing.    Cardiovascular: Positive for leg swelling. Negative for chest pain and palpitations.   Gastrointestinal: Positive for abdominal pain, diarrhea and nausea. Negative for blood in stool, constipation and vomiting.   Genitourinary: Negative for  difficulty urinating, dysuria, hematuria and urgency.   Musculoskeletal: Positive for back pain.   Skin: Positive for color change and wound. Negative for pallor.   Neurological: Positive for weakness (Generalized. ). Negative for dizziness and syncope.   Psychiatric/Behavioral: Negative for behavioral problems and confusion.    ---------------------------------------------------------------------------------------------------------------------   History reviewed. No pertinent past medical history.  No past surgical history on file.  History reviewed. No pertinent family history.  Social History     Social History   • Marital status: Single     Social History Main Topics   • Smoking status: Never Smoker   • Smokeless tobacco: Never Used   • Alcohol use Yes      Comment: a couple a month    • Drug use: No   • Sexual activity: Defer     Other Topics Concern   • Not on file   ---------------------------------------------------------------------------------------------------------------------   Allergies:  Patient has no known allergies.  ---------------------------------------------------------------------------------------------------------------------   Prior to Admission Medications     None      ---------------------------------------------------------------------------------------------------------------------  Objective   Hospital Scheduled Meds:    heparin (porcine) 5,000 Units Subcutaneous Q12H   vancomycin 20 mg/kg Intravenous Once       Pharmacy Consult - Pharmacy to dose    Pharmacy Consult - Pharmacy to dose    sodium chloride 125 mL/hr   ---------------------------------------------------------------------------------------------------------------------   Vital Signs:  Temp:  [99.8 °F (37.7 °C)-100.4 °F (38 °C)] 99.8 °F (37.7 °C)  Heart Rate:  [115-116] 116  Resp:  [18-20] 20  BP: (125-147)/(78-92) 139/87  Mean Arterial Pressure (Non-Invasive) for the past 24 hrs (Last 3 readings):   Noninvasive MAP (mmHg)    08/08/18 1346 103   08/08/18 1331 107   08/08/18 1319 102     SpO2 Percentage    08/08/18 1331 08/08/18 1346 08/08/18 1425   SpO2: 99% 99% 99%     SpO2:  [97 %-99 %] 99 %  on   ;        Body mass index is 29.88 kg/m².  Wt Readings from Last 3 Encounters:   08/08/18 79 kg (174 lb 1.6 oz)     ---------------------------------------------------------------------------------------------------------------------   Physical Exam:  Constitutional:  Well-developed and well-nourished.  No respiratory distress.      HENT:  Head: Normocephalic and atraumatic.  Mouth:  Moist mucous membranes.    Eyes:  Conjunctivae and EOM are normal. No scleral icterus. No erythema or drainage.  She has is missing most of her dentition  Neck:  Neck supple.  No JVD present. No carotid bruits.    Cardiovascular:  tachycardic, regular rhythm and normal heart sounds with no murmur.  Pulmonary/Chest:  No respiratory distress, no wheezes, no crackles, with normal breath sounds and good air movement.  No increased respiratory effort  Abdominal:  Soft.  Bowel sounds are normal.  No distension. Mild tenderness in the left upper quadrant. No rebound or guarding.   Musculoskeletal: Left foot and ankle are edematous extending just above the ankle. Lateral malleolus has an open wound present with greenish purulent drainage in the center but tissue looks viable.  Posterior to the lateral malleolus, there is an area of brownish/black tissue that appears necrotic with a superficial layer of the skin beginning to slough, with no open wound or drainage there. Blister noted on the dorsum of the left foot. There is erythema with faint extension up the front of the left leg, about half way to the knee.  Adequate distal pulses.  The lateral surface of the ankle is mildly warm.    Neurological:  Alert and oriented to person, place, and time.  No cranial nerve deficit.  No tongue deviation.  No facial droop.  No slurred speech.   Skin:  Lateral malleolus has an open  wound present with greenish purulent drainage in the center. Posterior to the lateral malleolus, there is an area of brownish/black tissue with no open wound or drainage there. Blister noted on the dorsum of the left foot. There is erythema with faint extension up the front of the left leg, about half way to the knee.   Psychiatric:  Normal mood and affect.  Behavior is normal.  Judgment and thought content normal.   Peripheral vascular: 2-3+ edema left foot and ankle and 2+ pedal pulses bilaterally.   Genitourinary: No robison catheter in place.   ---------------------------------------------------------------------------------------------------------------------  EKG:  Sinus tachycardia, otherwise unremarkable, reviewed    ---------------------------------------------------------------------------------------------------------------------               Results from last 7 days  Lab Units 08/08/18  1153   CRP mg/dL 19.24*   LACTATE mmol/L 1.2   WBC 10*3/mm3 21.04*   HEMOGLOBIN g/dL 13.3   HEMATOCRIT % 38.9   MCV fL 88.2   MCHC g/dL 34.2   PLATELETS 10*3/mm3 440*       Results from last 7 days  Lab Units 08/08/18  1153   SODIUM mmol/L 135   POTASSIUM mmol/L 3.9   CHLORIDE mmol/L 102   CO2 mmol/L 22.3*   BUN mg/dL 9   CREATININE mg/dL 0.64   EGFR IF NONAFRICN AM mL/min/1.73 106   CALCIUM mg/dL 9.0   GLUCOSE mg/dL 275*   ALBUMIN g/dL 4.10   BILIRUBIN mg/dL 0.6   ALK PHOS U/L 110*   AST (SGOT) U/L 16   ALT (SGPT) U/L 16   Estimated Creatinine Clearance: 124.7 mL/min (by C-G formula based on SCr of 0.64 mg/dL).    Results for MEAGAN RIZO (MRN [id number]) as of 8/8/2018 13:57   Ref. Range 8/8/2018 13:19   Color, UA Latest Ref Range: Yellow, Straw  Dark Yellow (A)   Appearance, UA Latest Ref Range: Clear  Cloudy (A)   Specific [place], UA Latest Ref Range: 1.005 - 1.030  >1.030 (H)   pH, UA Latest Ref Range: 5.0 - 8.0  5.5   Glucose, UA Latest Ref Range: Negative  >=1000 mg/dL (3+) (A)   Ketones, UA Latest Ref Range:  Negative  >=160 mg/dL (4+) (A)   Blood, UA Latest Ref Range: Negative  Negative   Nitrite, UA Latest Ref Range: Negative  Negative   Leuk Esterase, UA Latest Ref Range: Negative  Small (1+) (A)   Protein, UA Latest Ref Range: Negative  Trace (A)   Bilirubin, UA Latest Ref Range: Negative  Negative   Urobilinogen, UA Latest Ref Range: 0.2 - 1.0 E.U./dL  1.0 E.U./dL   RBC, UA Latest Ref Range: None Seen, 0-2 /HPF 3-5 (A)   WBC, UA Latest Ref Range: None Seen, 0-2 /HPF 13-20 (A)   Bacteria, UA Latest Ref Range: None Seen /HPF 1+ (A)   Yeast Latest Ref Range: None Seen /HPF Small/1+ Yeast   Squamous Epithelial Cells, UA Latest Ref Range: None Seen, 0-2 /HPF 13-20 (A)   Hyaline Casts, UA Latest Ref Range: None Seen /LPF 7-12   Methodology: Unknown Manual Light Micr...   HCG, Urine QL Latest Ref Range: Negative  Negative     Pain Management Panel     Pain Management Panel Latest Ref Rng & Units 8/8/2018    AMPHETAMINES SCREEN, URINE Negative Negative    BARBITURATES SCREEN Negative Negative    BENZODIAZEPINE SCREEN, URINE Negative Negative    BUPRENORPHINE Negative Negative    COCAINE SCREEN, URINE Negative Negative    METHADONE SCREEN, URINE Negative Negative      I have personally reviewed the above laboratory results.   ---------------------------------------------------------------------------------------------------------------------  Imaging Results (last 7 days)     Procedure Component Value Units Date/Time    XR Chest 1 View [[id number]] Collected:  08/08/18 1242     Updated:  08/08/18 1250    Narrative:       EXAMINATION: XR CHEST 1 VW-      CLINICAL INDICATION:     Simple sepsis protocol     TECHNIQUE:  XR CHEST 1 VW-      COMPARISON: NONE      FINDINGS:        Lungs are aerated.   Heart size, mediastinum, and pulmonary vascularity are unremarkable.   No pneumothorax.   No effusions.   No acute osseous findings.            Impression:       No radiographic evidence of acute cardiac or pulmonary  disease.     This  report was finalized on 8/8/2018 12:42 PM by Dr. Erlin Chong MD.       XR Ankle 3+ View Left [[id number]] Collected:  08/08/18 1242     Updated:  08/08/18 1250    Narrative:       EXAMINATION: XR ANKLE 3+ VW LEFT-      CLINICAL INDICATION:infection        COMPARISON: None      TECHNIQUE: 3 views left ankle     FINDINGS:   Marked degree of soft tissue swelling overlying the distal fibula. No  dystrophic calcifications identified. No periosteal reaction is noted.  No evidence of ankle effusion.           Impression:       Extensive soft tissue swelling overlying the lateral malleolus. No  destructive osseous changes identified.     This report was finalized on 8/8/2018 12:43 PM by Dr. Erlin Chong MD.           I have personally reviewed the above radiology results.   ---------------------------------------------------------------------------------------------------------------------  Assessment & Plan      -Sepsis, present on admission, as noted by tachycardia in the 110s-120s, fever 100.4, leukocytosis at 21,000, with acute cellulitis/possible abscess left lateral malleolus: Sepsis bundle added. Blood cultures and wound cultures drawn in the ED. Lactic acid is normal. /87. Continue vancomycin and zosyn with pharmacy to dose. Start on IV fluids at 125mL/hr. Consult infectious disease as well as orthopedic surgery, appreciate their input. Repeat CBC, CMP, CRP in AM.  Etiology is uncertain, possible spider bite    -Acute cellulitis with possible abscess left lateral malleolus: Therapy as noted above. Consult wound care.     -Sinus tachycardia: likely related to sepsis and dehydration. Continue IV fluids as noted above and monitor on telemetry. Check magnesium and TSH.     -Hyperglycemia: Patient with no previously diagnosed diabetes, but does have a family history and does not follow regularly with a PCP. Could be related to infection but most likely at this level she does have diabetes. Will place on low  dose sliding scale insulin with finger stick blood glucose readings AC and HS. Place on hypoglycemia protocol. Check hemoglobin A1c level.  There is no increased anion gap.    -Diarrhea: stool studies ordered in the ED. Patient has not had any recent antibiotic therapy, other than a rocephin IM injection yesterday. Her mother is in a local nursing home and she visits frequently. C. Diff PCR has been ordered.     -History of seizure disorder: No seizures reported since she was 14 years old.     -DVT Prophylaxis: Subcutaneous heparin.    The patient is considered to be a high risk patient due to: sepsis, cellulitis with possible abscess.     Code Status: FULL CODE.     I have discussed the patient's assessment and plan with the patient.     VIVIANA Mcleod  08/08/18  2:40 PM  ---------------------------------------------------------------------------------------------------------------------

## 2018-08-09 LAB
ACETONE BLD QL: ABNORMAL
ALBUMIN SERPL-MCNC: 3.3 G/DL (ref 3.5–5)
ALBUMIN/GLOB SERPL: 1.1 G/DL (ref 1.5–2.5)
ALP SERPL-CCNC: 89 U/L (ref 35–104)
ALT SERPL W P-5'-P-CCNC: 19 U/L (ref 10–36)
ANION GAP SERPL CALCULATED.3IONS-SCNC: 12.9 MMOL/L (ref 3.6–11.2)
ANION GAP SERPL CALCULATED.3IONS-SCNC: 6.9 MMOL/L (ref 3.6–11.2)
AST SERPL-CCNC: 19 U/L (ref 10–30)
ATMOSPHERIC PRESS: 728 MMHG
BASE EXCESS BLDV CALC-SCNC: -0.5 MMOL/L
BASOPHILS # BLD AUTO: 0.03 10*3/MM3 (ref 0–0.3)
BASOPHILS NFR BLD AUTO: 0.2 % (ref 0–2)
BDY SITE: NORMAL
BILIRUB SERPL-MCNC: 0.4 MG/DL (ref 0.2–1.8)
BODY TEMPERATURE: 98.6 C
BUN BLD-MCNC: 10 MG/DL (ref 7–21)
BUN BLD-MCNC: 9 MG/DL (ref 7–21)
BUN/CREAT SERPL: 17.3 (ref 7–25)
BUN/CREAT SERPL: 17.9 (ref 7–25)
CALCIUM SPEC-SCNC: 8.2 MG/DL (ref 7.7–10)
CALCIUM SPEC-SCNC: 8.3 MG/DL (ref 7.7–10)
CHLORIDE SERPL-SCNC: 104 MMOL/L (ref 99–112)
CHLORIDE SERPL-SCNC: 107 MMOL/L (ref 99–112)
CO2 SERPL-SCNC: 20.1 MMOL/L (ref 24.3–31.9)
CO2 SERPL-SCNC: 25.1 MMOL/L (ref 24.3–31.9)
CREAT BLD-MCNC: 0.52 MG/DL (ref 0.43–1.29)
CREAT BLD-MCNC: 0.56 MG/DL (ref 0.43–1.29)
CRP SERPL-MCNC: 16.15 MG/DL (ref 0–0.99)
DEPRECATED RDW RBC AUTO: 37.7 FL (ref 37–54)
EOSINOPHIL # BLD AUTO: 0.1 10*3/MM3 (ref 0–0.7)
EOSINOPHIL NFR BLD AUTO: 0.5 % (ref 0–5)
ERYTHROCYTE [DISTWIDTH] IN BLOOD BY AUTOMATED COUNT: 12 % (ref 11.5–14.5)
GFR SERPL CREATININE-BSD FRML MDRD: 123 ML/MIN/1.73
GFR SERPL CREATININE-BSD FRML MDRD: 134 ML/MIN/1.73
GLOBULIN UR ELPH-MCNC: 2.9 GM/DL
GLUCOSE BLD-MCNC: 266 MG/DL (ref 70–110)
GLUCOSE BLD-MCNC: 337 MG/DL (ref 70–110)
GLUCOSE BLDC GLUCOMTR-MCNC: 209 MG/DL (ref 70–130)
GLUCOSE BLDC GLUCOMTR-MCNC: 263 MG/DL (ref 70–130)
GLUCOSE BLDC GLUCOMTR-MCNC: 270 MG/DL (ref 70–130)
GLUCOSE BLDC GLUCOMTR-MCNC: 304 MG/DL (ref 70–130)
HCO3 BLDV-SCNC: 23.8 MMOL/L
HCT VFR BLD AUTO: 32.5 % (ref 37–47)
HGB BLD-MCNC: 11.1 G/DL (ref 12–16)
HGB BLDA-MCNC: 12 G/DL (ref 12–16)
HOROWITZ INDEX BLD+IHG-RTO: 21 %
IMM GRANULOCYTES # BLD: 0.08 10*3/MM3 (ref 0–0.03)
IMM GRANULOCYTES NFR BLD: 0.4 % (ref 0–0.5)
LYMPHOCYTES # BLD AUTO: 2.3 10*3/MM3 (ref 1–3)
LYMPHOCYTES NFR BLD AUTO: 12.6 % (ref 21–51)
MCH RBC QN AUTO: 30.1 PG (ref 27–33)
MCHC RBC AUTO-ENTMCNC: 34.2 G/DL (ref 33–37)
MCV RBC AUTO: 88.1 FL (ref 80–94)
MODALITY: NORMAL
MONOCYTES # BLD AUTO: 1.47 10*3/MM3 (ref 0.1–0.9)
MONOCYTES NFR BLD AUTO: 8 % (ref 0–10)
NEUTROPHILS # BLD AUTO: 14.29 10*3/MM3 (ref 1.4–6.5)
NEUTROPHILS NFR BLD AUTO: 78.3 % (ref 30–70)
OSMOLALITY SERPL CALC.SUM OF ELEC: 282.2 MOSM/KG (ref 273–305)
OSMOLALITY SERPL CALC.SUM OF ELEC: 289.5 MOSM/KG (ref 273–305)
PCO2 BLDV: 37.9 MM HG
PH BLDV: 7.42 PH UNITS
PLATELET # BLD AUTO: 422 10*3/MM3 (ref 130–400)
PMV BLD AUTO: 9.2 FL (ref 6–10)
PO2 BLDV: 60 MM HG
POTASSIUM BLD-SCNC: 3.5 MMOL/L (ref 3.5–5.3)
POTASSIUM BLD-SCNC: 3.9 MMOL/L (ref 3.5–5.3)
PROT SERPL-MCNC: 6.2 G/DL (ref 6–8)
RBC # BLD AUTO: 3.69 10*6/MM3 (ref 4.2–5.4)
SAO2 % BLDCOV: 91.7 %
SODIUM BLD-SCNC: 137 MMOL/L (ref 135–153)
SODIUM BLD-SCNC: 139 MMOL/L (ref 135–153)
WBC NRBC COR # BLD: 18.27 10*3/MM3 (ref 4.5–12.5)

## 2018-08-09 PROCEDURE — 82962 GLUCOSE BLOOD TEST: CPT

## 2018-08-09 PROCEDURE — 80048 BASIC METABOLIC PNL TOTAL CA: CPT | Performed by: PHYSICIAN ASSISTANT

## 2018-08-09 PROCEDURE — 63710000001 INSULIN DETEMIR PER 5 UNITS: Performed by: PHYSICIAN ASSISTANT

## 2018-08-09 PROCEDURE — 25010000002 VANCOMYCIN PER 500 MG

## 2018-08-09 PROCEDURE — 25010000002 CEFTRIAXONE: Performed by: NURSE PRACTITIONER

## 2018-08-09 PROCEDURE — 99233 SBSQ HOSP IP/OBS HIGH 50: CPT | Performed by: PHYSICIAN ASSISTANT

## 2018-08-09 PROCEDURE — 86140 C-REACTIVE PROTEIN: CPT | Performed by: INTERNAL MEDICINE

## 2018-08-09 PROCEDURE — 25010000002 HEPARIN (PORCINE) PER 1000 UNITS: Performed by: PHYSICIAN ASSISTANT

## 2018-08-09 PROCEDURE — 82009 KETONE BODYS QUAL: CPT | Performed by: INTERNAL MEDICINE

## 2018-08-09 PROCEDURE — 84681 ASSAY OF C-PEPTIDE: CPT | Performed by: INTERNAL MEDICINE

## 2018-08-09 PROCEDURE — 82805 BLOOD GASES W/O2 SATURATION: CPT | Performed by: INTERNAL MEDICINE

## 2018-08-09 PROCEDURE — 80053 COMPREHEN METABOLIC PANEL: CPT | Performed by: PHYSICIAN ASSISTANT

## 2018-08-09 PROCEDURE — 63710000001 INSULIN ASPART PER 5 UNITS: Performed by: PHYSICIAN ASSISTANT

## 2018-08-09 PROCEDURE — 94799 UNLISTED PULMONARY SVC/PX: CPT

## 2018-08-09 PROCEDURE — 85025 COMPLETE CBC W/AUTO DIFF WBC: CPT | Performed by: PHYSICIAN ASSISTANT

## 2018-08-09 RX ORDER — NICOTINE POLACRILEX 4 MG
15 LOZENGE BUCCAL
Status: DISCONTINUED | OUTPATIENT
Start: 2018-08-09 | End: 2018-08-15 | Stop reason: HOSPADM

## 2018-08-09 RX ORDER — L.ACID,PARA/B.BIFIDUM/S.THERM 8B CELL
1 CAPSULE ORAL DAILY
Status: DISCONTINUED | OUTPATIENT
Start: 2018-08-09 | End: 2018-08-15 | Stop reason: HOSPADM

## 2018-08-09 RX ORDER — ACETAMINOPHEN 325 MG/1
650 TABLET ORAL EVERY 6 HOURS PRN
Status: DISCONTINUED | OUTPATIENT
Start: 2018-08-09 | End: 2018-08-10

## 2018-08-09 RX ORDER — DEXTROSE MONOHYDRATE 25 G/50ML
25 INJECTION, SOLUTION INTRAVENOUS
Status: DISCONTINUED | OUTPATIENT
Start: 2018-08-09 | End: 2018-08-15 | Stop reason: HOSPADM

## 2018-08-09 RX ADMIN — VANCOMYCIN HYDROCHLORIDE 1250 MG: 5 INJECTION, POWDER, LYOPHILIZED, FOR SOLUTION INTRAVENOUS at 00:03

## 2018-08-09 RX ADMIN — SODIUM CHLORIDE 125 ML/HR: 9 INJECTION, SOLUTION INTRAVENOUS at 15:18

## 2018-08-09 RX ADMIN — VANCOMYCIN HYDROCHLORIDE 1250 MG: 5 INJECTION, POWDER, LYOPHILIZED, FOR SOLUTION INTRAVENOUS at 12:23

## 2018-08-09 RX ADMIN — SODIUM CHLORIDE 125 ML/HR: 9 INJECTION, SOLUTION INTRAVENOUS at 19:23

## 2018-08-09 RX ADMIN — CEFTRIAXONE 2 G: 2 INJECTION, POWDER, FOR SOLUTION INTRAMUSCULAR; INTRAVENOUS at 16:43

## 2018-08-09 RX ADMIN — Medication 1 CAPSULE: at 12:23

## 2018-08-09 RX ADMIN — HEPARIN SODIUM 5000 UNITS: 5000 INJECTION, SOLUTION INTRAVENOUS; SUBCUTANEOUS at 08:49

## 2018-08-09 RX ADMIN — ACETAMINOPHEN 650 MG: 325 TABLET, FILM COATED ORAL at 05:55

## 2018-08-09 RX ADMIN — SODIUM CHLORIDE 125 ML/HR: 9 INJECTION, SOLUTION INTRAVENOUS at 04:37

## 2018-08-09 RX ADMIN — VANCOMYCIN HYDROCHLORIDE 1250 MG: 5 INJECTION, POWDER, LYOPHILIZED, FOR SOLUTION INTRAVENOUS at 23:33

## 2018-08-09 RX ADMIN — INSULIN DETEMIR 10 UNITS: 100 INJECTION, SOLUTION SUBCUTANEOUS at 19:25

## 2018-08-09 RX ADMIN — HEPARIN SODIUM 5000 UNITS: 5000 INJECTION, SOLUTION INTRAVENOUS; SUBCUTANEOUS at 19:23

## 2018-08-09 RX ADMIN — INSULIN ASPART 7 UNITS: 100 INJECTION, SOLUTION INTRAVENOUS; SUBCUTANEOUS at 16:41

## 2018-08-09 RX ADMIN — INSULIN ASPART 6 UNITS: 100 INJECTION, SOLUTION INTRAVENOUS; SUBCUTANEOUS at 12:18

## 2018-08-09 RX ADMIN — INSULIN ASPART 6 UNITS: 100 INJECTION, SOLUTION INTRAVENOUS; SUBCUTANEOUS at 19:23

## 2018-08-09 NOTE — PROGRESS NOTES
Discharge Planning Assessment  [facility]     Patient Name: Cynthia Ríos  MRN: [id number]  Today's Date: 8/9/2018    Admit Date: 8/8/2018          Discharge Needs Assessment     Row Name 08/09/18 1728       Living Environment    Lives With alone    Primary Care Provided by self    Provides Primary Care For no one    Family Caregiver if Needed none    Quality of Family Relationships other (see comments)   Pt states having no family support.     Able to Return to Prior Arrangements yes       Transition Planning    Transportation Anticipated car, drives self       Discharge Needs Assessment    Equipment Currently Used at Home none    Outpatient/Agency/Support Group Needs --   Pt does not utilize home health services. Pt had wound vac placed after surgery. Pt does not have a PCP, therefore will be unable to receive home health services. SS discussed options with pt including coming to [facility] infusion clinic for wound vac dressing    Discharge Facility/Level of Care Needs --   changes. Pt states she is employed and travels for work.             Discharge Plan     Row Name 08/09/18 1732       Plan    Plan Pt lives at home alone and plans to return home at discharge. Pt does not utilize DME or home health services. Pt does not have a PCP. Pt utilizes [facility] and does not issues with copays. Pt does not have POA or living will. Pt had wound vac placed after surgery. SS discussed options for wound vac dressing changes with pt including [facility] infusion clinic due to pt having no PCP. SS to follow and assist as needed with discharge planning.     Patient/Family in Agreement with Plan yes          Demographic Summary     Row Name 08/09/18 1728       General Information    Referral Source nursing    Reason for Consult --   SS received consult d/c planning; has wound VAC post op. SS spoke to pt.    Nga Silvestre

## 2018-08-09 NOTE — PROGRESS NOTES
"  I have personally seen and examined the patient today and discussed overnight interval progress and pertinent issues with nursing staff.    Subjective:    No issues or complaints.  WBC improving at 18.27.  CRP improving at 16.15.  Urine culture preliminary shows no growth.  Wound culture remains in progress.  Blood cultures show no growth at 24 hours.  Chlamydia and gonorrhea antibodies in progress.    History taken from: patient chart RN      Vital Signs    /79 (BP Location: Left arm, Patient Position: Lying)   Pulse 90   Temp 98.6 °F (37 °C) (Axillary)   Resp 18   Ht 162.6 cm (64\")   Wt 79 kg (174 lb 1.6 oz)   SpO2 95%   BMI 29.88 kg/m²     Temp:  [97.7 °F (36.5 °C)-101.9 °F (38.8 °C)] 98.6 °F (37 °C)      Intake/Output Summary (Last 24 hours) at 08/09/18 1319  Last data filed at 08/09/18 0437   Gross per 24 hour   Intake             1470 ml   Output                0 ml   Net             1470 ml     Intake & Output (last 3 days)       08/06 0701 - 08/07 0700 08/07 0701 - 08/08 0700 08/08 0701 - 08/09 0700 08/09 0701 - 08/10 0700    P.O.   120     I.V. (mL/kg)   1350 (17.1)     Total Intake(mL/kg)   1470 (18.6)     Net     +1470              Unmeasured Urine Occurrence   2 x     Unmeasured Stool Occurrence   2 x           Physical exam:    General Appearance:    Alert, fever, chills and malaise    Head:    Normocephalic, without obvious abnormality, atraumatic   Eyes:            Lids and lashes normal, conjunctivae and sclerae normal, no   icterus, no pallor, corneas clear, PERRLA   Ears:    Ears appear intact with no abnormalities noted   Throat:   No oral lesions, no thrush, oral mucosa moist   Neck:   No adenopathy, supple, trachea midline, no thyromegaly, no   carotid bruit, no JVD   Back:     No tenderness to percussion or palpation, range of motion   normal   Lungs:     Clear to auscultation,respirations regular, even and           unlabored. No wheezing, no ronchi and no crackles.    Heart:    " Regular rhythm and normal rate, normal S1 and S2, no            murmur, no gallop, no rub, no click   Chest Wall:    No abnormalities observed   Abdomen:     Normal bowel sounds, no masses, no organomegaly, soft        non-tender, non-distended, no guarding, no rebound                tenderness   Rectal:     Deferred   Extremities:   Moves all extremities well, no edema, no cyanosis, no             redness   Pulses:   Pulses palpable and equal bilaterally   Skin:   Left ankle outer aspect noted with open ulceration status post removal of scab.  Ankle and foot and toes with significant erythema, edema and warmth with limited range of motion.  Significant area aside from previous mention noted to have possibly been blister but is drained now with discoloration underneath.    Lymph nodes:   No palpable adenopathy   Neurologic:   Awake, alert and oriented x 3. Following commands.       Results:      Results from last 7 days  Lab Units 08/09/18  0150 08/08/18  1153   WBC 10*3/mm3 18.27* 21.04*     Lab Results   Component Value Date    NEUTROABS 14.29 (H) 08/09/2018         Results from last 7 days  Lab Units 08/09/18  0150   CREATININE mg/dL 0.56         Results from last 7 days  Lab Units 08/09/18  0150 08/08/18  1153   CRP mg/dL 16.15* 19.24*       Imaging Results (last 24 hours)     ** No results found for the last 24 hours. **            Results Review:    I have personally reviewed laboratory data, culture results, radiology studies and antimicrobial therapy.    Hospital Medications (active)       Dose Frequency Start End    acetaminophen (TYLENOL) tablet 650 mg 650 mg Every 6 Hours PRN 8/9/2018     Sig - Route: Take 2 tablets by mouth Every 6 (Six) Hours As Needed for Mild Pain  or Fever. - Oral    cefTRIAXone (ROCEPHIN) 2 g/100 mL 0.9% NS VTB (DENISHA) 2 g Every 24 Hours 8/8/2018 8/13/2018    Sig - Route: Infuse 100 mL into a venous catheter Daily. - Intravenous    dextrose (D50W) solution 25 g 25 g Every 15 Minutes  PRN 8/8/2018     Sig - Route: Infuse 50 mL into a venous catheter Every 15 (Fifteen) Minutes As Needed for Low Blood Sugar (Blood Sugar Less Than 70). - Intravenous    Cosign for Ordering: Accepted by Manolo Tripathi MD on 8/8/2018  4:16 PM    dextrose (D50W) solution 25 g 25 g Every 15 Minutes PRN 8/9/2018     Sig - Route: Infuse 50 mL into a venous catheter Every 15 (Fifteen) Minutes As Needed for Low Blood Sugar (Blood Sugar Less Than 70). - Intravenous    Cosign for Ordering: Accepted by Manolo Tripathi MD on 8/9/2018 12:19 PM    dextrose (GLUTOSE) oral gel 15 g 15 g Every 15 Minutes PRN 8/8/2018     Sig - Route: Take 15 g by mouth Every 15 (Fifteen) Minutes As Needed for Low Blood Sugar (Blood sugar less than 70). - Oral    Cosign for Ordering: Accepted by Manolo Tripathi MD on 8/8/2018  4:16 PM    dextrose (GLUTOSE) oral gel 15 g 15 g Every 15 Minutes PRN 8/9/2018     Sig - Route: Take 15 g by mouth Every 15 (Fifteen) Minutes As Needed for Low Blood Sugar (Blood sugar less than 70). - Oral    Cosign for Ordering: Accepted by Manolo Tripathi MD on 8/9/2018 12:19 PM    glucagon (human recombinant) (GLUCAGEN DIAGNOSTIC) injection 1 mg 1 mg As Needed 8/8/2018     Sig - Route: Inject 1 mg under the skin into the appropriate area as directed As Needed (Blood Glucose Less Than 70). - Subcutaneous    Cosign for Ordering: Accepted by Manolo Tripathi MD on 8/8/2018  4:16 PM    glucagon (human recombinant) (GLUCAGEN DIAGNOSTIC) injection 1 mg 1 mg As Needed 8/9/2018     Sig - Route: Inject 1 mg under the skin into the appropriate area as directed As Needed (Blood Glucose Less Than 70). - Subcutaneous    Cosign for Ordering: Accepted by Manolo Tripathi MD on 8/9/2018 12:19 PM    heparin (porcine) 5000 UNIT/ML injection 5,000 Units 5,000 Units Every 12 Hours Scheduled 8/8/2018     Sig - Route: Inject 1 mL under the skin into the appropriate area as directed Every 12 (Twelve) Hours. - Subcutaneous    Cosign for Ordering:  Accepted by Manolo Tripathi MD on 8/8/2018  4:16 PM    insulin aspart (novoLOG) injection 0-9 Units 0-9 Units 4 Times Daily Before Meals & Nightly 8/9/2018     Sig - Route: Inject 0-9 Units under the skin into the appropriate area as directed 4 (Four) Times a Day Before Meals & at Bedtime. - Subcutaneous    Cosign for Ordering: Accepted by Manolo Tripathi MD on 8/9/2018 12:19 PM    insulin detemir (LEVEMIR) injection 10 Units 10 Units Nightly 8/9/2018     Sig - Route: Inject 10 Units under the skin into the appropriate area as directed Every Night. - Subcutaneous    Cosign for Ordering: Accepted by Manolo Tripathi MD on 8/9/2018 12:19 PM    lactobacillus acidophilus (RISAQUAD) capsule 1 capsule 1 capsule Daily 8/9/2018     Sig - Route: Take 1 capsule by mouth Daily. - Oral    nitroglycerin (NITROSTAT) SL tablet 0.4 mg 0.4 mg Every 5 Minutes PRN 8/8/2018     Sig - Route: Place 1 tablet under the tongue Every 5 (Five) Minutes As Needed for Chest Pain (if systolic BP greater than 100 mm/Hg.). - Sublingual    Cosign for Ordering: Accepted by Manolo Tripathi MD on 8/8/2018  4:16 PM    sodium chloride 0.9 % flush 1-10 mL 1-10 mL As Needed 8/8/2018     Sig - Route: Infuse 1-10 mL into a venous catheter As Needed for Line Care. - Intravenous    Cosign for Ordering: Accepted by Manolo Tripathi MD on 8/8/2018  4:16 PM    sodium chloride 0.9 % flush 10 mL 10 mL As Needed 8/8/2018     Sig - Route: Infuse 10 mL into a venous catheter As Needed for Line Care. - Intravenous    Cosign for Ordering: Accepted by Placido Holland MD on 8/8/2018  1:14 PM    sodium chloride 0.9 % infusion 125 mL/hr Continuous 8/8/2018     Sig - Route: Infuse 125 mL/hr into a venous catheter Continuous. - Intravenous    Cosign for Ordering: Accepted by Manolo Tripathi MD on 8/8/2018  4:16 PM    vancomycin (VANCOCIN) 1,250 mg in sodium chloride 0.9 % 250 mL IVPB 1,250 mg Every 12 Hours 8/9/2018 8/15/2018    Sig - Route: Infuse 1,250 mg into a venous  catheter Every 12 (Twelve) Hours. - Intravenous    vancomycin (VANCOCIN) 1,500 mg in sodium chloride 0.9 % 500 mL IVPB 20 mg/kg × 79.4 kg Once 8/8/2018 8/8/2018    Sig - Route: Infuse 1,500 mg into a venous catheter 1 (One) Time. - Intravenous    Cosign for Ordering: Accepted by Placido Holland MD on 8/8/2018  1:14 PM    fentaNYL citrate (PF) (SUBLIMAZE) injection (Discontinued)  As Needed 8/8/2018 8/8/2018    Sig - Route: Infuse  into a venous catheter As Needed for Severe Pain . - Intravenous    Reason for Discontinue: Anesthesia Stop    lactated ringers infusion (Discontinued) 125 mL/hr Continuous 8/8/2018 8/9/2018    Sig - Route: Infuse 125 mL/hr into a venous catheter Continuous. - Intravenous    metFORMIN (GLUCOPHAGE) tablet 500 mg (Discontinued) 500 mg 2 Times Daily With Meals 8/9/2018 8/9/2018    Sig - Route: Take 1 tablet by mouth 2 (Two) Times a Day With Meals. - Oral    Cosign for Ordering: Accepted by Manolo Tripathi MD on 8/9/2018 12:19 PM    midazolam (VERSED) injection (Discontinued)  As Needed 8/8/2018 8/8/2018    Sig - Route: Infuse  into a venous catheter As Needed. - Intravenous    Reason for Discontinue: Anesthesia Stop    ondansetron (ZOFRAN) injection (Discontinued)  As Needed 8/8/2018 8/8/2018    Sig - Route: Infuse  into a venous catheter As Needed for Nausea or Vomiting. - Intravenous    Reason for Discontinue: Anesthesia Stop    Pharmacy Consult - Pharmacy to dose (Discontinued)  Continuous PRN 8/8/2018 8/8/2018    Sig - Route: Continuous As Needed for Consult. - Does not apply    Reason for Discontinue: Dose adjustment    Cosign for Ordering: Accepted by Manolo Tripahti MD on 8/8/2018  4:16 PM    Pharmacy Consult - Pharmacy to dose (Discontinued)  Continuous PRN 8/8/2018 8/8/2018    Sig - Route: Continuous As Needed for Consult. - Does not apply    Reason for Discontinue: Dose adjustment    Cosign for Ordering: Accepted by Manolo Tripathi MD on 8/8/2018  4:16 PM     piperacillin-tazobactam (ZOSYN) 3.375 g/100 mL 0.9% NS IVPB (mbp) (Discontinued) 3.375 g Every 8 Hours 8/8/2018 8/8/2018    Sig - Route: Infuse 100 mL into a venous catheter Every 8 (Eight) Hours. - Intravenous    propofol (DIPRIVAN) infusion 10 mg/mL 100 mL (Discontinued)  Continuous PRN 8/8/2018 8/8/2018    Sig - Route: Infuse  into a venous catheter Continuous As Needed. - Intravenous    Reason for Discontinue: Anesthesia Stop    Propofol (DIPRIVAN) injection (Discontinued)  As Needed 8/8/2018 8/8/2018    Sig - Route: Infuse  into a venous catheter As Needed. - Intravenous    Reason for Discontinue: Anesthesia Stop    sodium chloride 0.9 % flush 1-10 mL (Discontinued) 1-10 mL As Needed 8/8/2018 8/8/2018    Sig - Route: Infuse 1-10 mL into a venous catheter As Needed for Line Care. - Intravenous    Reason for Discontinue: Patient Transfer    sodium chloride 1,000 mL with vancomycin 1,000 mg irrigation (Discontinued)  As Needed 8/8/2018 8/8/2018    Sig: As Needed.    Reason for Discontinue: Patient Discharge    sterile water irrigation solution (Discontinued)  As Needed 8/8/2018 8/8/2018    Sig: As Needed.    Reason for Discontinue: Patient Discharge            Cultures:    Blood Culture   Date Value Ref Range Status   08/08/2018 No growth at 24 hours  Preliminary   08/08/2018 No growth at 24 hours  Preliminary     Urine Culture   Date Value Ref Range Status   08/08/2018 Normal Urogenital Kelle  Preliminary           Assessment/Plan     ASSESSMENT:    1.  Septic arthritis versus severe cellulitis  2.  Lower extremity abscess    PLAN:    No issues or complaints.  WBC improving at 18.27.  CRP improving at 16.15.  Urine culture preliminary shows no growth.  Wound culture remains in progress.  Blood cultures show no growth at 24 hours.  Chlamydia and gonorrhea antibodies in progress.    This is very concerning for septic arthritis due to decreased range of motion and very elevated CRP and white count most consistent  with deep seated infection.     Differential diagnosis could be gonococcal arthritis  or inflammatory reactive arthritis.      We recommend to continue high-dose Rocephin every 24 hours and vancomycin pharmacy to dose. CRP in the a.m. Will continue to follow closely. Will adjust antibiotics based on finalization of I&D culture results.        Current antimicrobials:     Rocephin 2 g IV daily     Vancomycin pharmacy to dose      Patient's findings and recommendations were discussed with patient, nursing staff, primary care team and consulting provider    Code Status:   Code Status and Medical Interventions:   Ordered at: 08/08/18 1559     Code Status:    CPR     Medical Interventions (Level of Support Prior to Arrest):    Full     Comments:    Any long term medical decisions to be made by her brother, Eric Oconnor who she reports to be her next of kin, in the event she can't decide for herself.       Alissa Ramirez, RADHA  08/09/18  1:19 PM

## 2018-08-09 NOTE — ANESTHESIA POSTPROCEDURE EVALUATION
Patient: Cynthia Ríos    Procedure Summary     Date:  08/08/18 Room / Location:   [facility]    Anesthesia Start:  1832 Anesthesia Stop:  1916    Procedure:  INCISION AND DRAINAGE  OF LEFT ANKLE ABCSESS (Left ) Diagnosis:       Cellulitis of left ankle      (Cellulitis of left ankle [L03.116])    Surgeon:  Abraham Artis MD Provider:  Vinicius Pastor DO    Anesthesia Type:  general ASA Status:  2          Anesthesia Type: general  Last vitals  BP   102/56 (08/09/18 0300)   Temp   99 °F (37.2 °C) (08/09/18 0300)   Pulse   105 (08/09/18 0300)   Resp   16 (08/09/18 0300)     SpO2   95 % (08/09/18 0300)     Post Anesthesia Care and Evaluation    Patient location during evaluation: PHASE II  Patient participation: complete - patient participated  Level of consciousness: awake and alert  Pain score: 1  Pain management: adequate  Airway patency: patent  Anesthetic complications: No anesthetic complications  PONV Status: controlled  Cardiovascular status: acceptable  Respiratory status: acceptable  Hydration status: acceptable

## 2018-08-09 NOTE — CONSULTS
"Diabetes Education  Assessment/Teaching    Patient Name:  Cynthia Ríos  YOB: 1985  MRN: [id number]  Admit Date:  8/8/2018      Assessment Date:  8/9/2018    Most Recent Value   General Information    Height  162.6 cm (64\")   Height Method  Stated   Weight  79 kg (174 lb 1.6 oz)   Weight Method  Bed scale   Pregnancy Assessment   Diabetes History   What type of diabetes do you have?  Other (comment) [\"diagnosed with PCOS when I was 6 years old\"]   Current DM knowledge  fair [says \" on both sides of my family\" when ask about diabetes]   Have you had diabetes education/teaching in the past?  no   Do you test your blood sugar at home?  yes   Frequency of checks  experience with parent assisting with her glucometer checks   What makes it difficult for you to take care of your diabetes or yourself?  healthy and do not have a time to take care of myself, discussed briefly   Do you have any diabetes complications?  other (comment), foot ulcers [wound vac]   Education Preferences   Nutrition Information   What type of support do you currently use to help you with your health issues?  trifold at bedside related to nutrition and client says understood   Assessment Topics   Healthy Eating - Assessment  Competent   Being Active - Assessment  Competent   Taking Medication - Assessment  Competent   Problem Solving - Assessment  Competent   Reducing Risk - Assessment  Competent   DM Goals            Most Recent Value   DM Education Needs   Meter  Needs meter   Medication  Pen, Insulin [glucophage discussed]   Problem Solving  -- [healing]   Reducing Risks  A1C testing [A1c 10.2]   Healthy Eating  Other (comment)   Physical Activity  Other (comment)   Healthy Coping  Appropriate   Discharge Plan  Home   Motivation  Engaged   Teaching Method  Explanation, Discussion, Handouts   Patient Response  Verbalized understanding            Other Comments:  Demonstrated insulin pen use, storage with picture guide for client did " excellent job of repeating demonstration, injected self with syringe & vial at supper dose and did very well with injection        Electronically signed by:  Ciara Wells RN  08/09/18 4:56 PM

## 2018-08-09 NOTE — PLAN OF CARE
Problem: Patient Care Overview  Goal: Plan of Care Review  Outcome: Ongoing (interventions implemented as appropriate)    Goal: Individualization and Mutuality  Outcome: Ongoing (interventions implemented as appropriate)      Problem: Pain, Acute (Adult)  Goal: Identify Related Risk Factors and Signs and Symptoms  Outcome: Outcome(s) achieved Date Met: 08/09/18    Goal: Acceptable Pain Control/Comfort Level  Outcome: Ongoing (interventions implemented as appropriate)      Problem: Wound (Includes Pressure Injury) (Adult)  Goal: Signs and Symptoms of Listed Potential Problems Will be Absent, Minimized or Managed (Wound)  Outcome: Ongoing (interventions implemented as appropriate)      Problem: Skin Injury Risk (Adult)  Goal: Identify Related Risk Factors and Signs and Symptoms  Outcome: Outcome(s) achieved Date Met: 08/09/18    Goal: Skin Health and Integrity  Outcome: Ongoing (interventions implemented as appropriate)      Problem: Diabetes, Type 2 (Adult)  Goal: Signs and Symptoms of Listed Potential Problems Will be Absent, Minimized or Managed (Diabetes, Type 2)  Outcome: Ongoing (interventions implemented as appropriate)

## 2018-08-09 NOTE — PROGRESS NOTES
Patient Identification:  Name:  Cynthia Ríos  Age:  33 y.o.  Sex:  female  :  1985  MRN:  [id number]  Visit Number:  [id number]  Primary Care Provider:  Provider, No Known    Length of stay:  1    HPI:     33-year-old  female admitted yesterday after presentation to the ED with left lower extremity lateral cellulitis with possible abscess of left lateral malleolus. She was admitted with broad spectrum IV abx treatment and orthopedic consultation with plans for I&D.  ID and orthopedics were consulted.  Prior to yesterday, she did not know she had diabetes mellitus but reports strong history.     Consultants:   -Orthopedics with Dr. Artis  -Infectious Disease team    Procedures:  -I&D with excisional debridement of skin and soft tissue and wound vac placement on 18 by Dr. Artis with debridement of 8cps7mc wound    Subjective:      Ms. Ríos is laying in bed and was resting upon entry to room.  Elevated hemoglobin a1c was discussed.  Mrs. Ríos reports that she has felt warm intermittently throughout the evening.  Ms. 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  She denies further diarrhea.  She denies further vomiting. Expresses concern regarding wound vac and doing her job as an on-the-road  discussed importance of wound vac with wound healing in setting of large debridement.    Discussed with [facility] Kramer    ----------------------------------------------------------------------------------------------------------------------  Current Hospital Meds:    ceftriaxone 2 g Intravenous Q24H   heparin (porcine) 5,000 Units Subcutaneous Q12H   insulin aspart 0-9 Units Subcutaneous 4x Daily AC & at Bedtime   insulin detemir 10 Units Subcutaneous Nightly   vancomycin 1,250 mg Intravenous Q12H       sodium chloride 125 mL/hr Last Rate: 125 mL/hr (18 4522)      ----------------------------------------------------------------------------------------------------------------------  Vital Signs:  Temp:  [97.7 °F (36.5 °C)-101.9 °F (38.8 °C)] 98.6 °F (37 °C)  Heart Rate:  [] 90  Resp:  [15-20] 18  BP: (102-148)/(56-92) 139/79  1    08/08/18  1044 08/08/18  1425   Weight: 79.4 kg (175 lb) 79 kg (174 lb 1.6 oz)     Body mass index is 29.88 kg/m².    Intake/Output Summary (Last 24 hours) at 08/09/18 1052  Last data filed at 08/09/18 0437   Gross per 24 hour   Intake             1470 ml   Output                0 ml   Net             1470 ml     No intake/output data recorded.  Diet Regular; Consistent Carbohydrate  ----------------------------------------------------------------------------------------------------------------------  Physical exam:  Constitutional: Hirsut appearing. Well-developed and well-nourished.  No respiratory distress.      HENT:  Head:  Normocephalic and atraumatic.  Mouth:  Moist mucous membranes.    Eyes:  Conjunctivae and EOM are normal.  Pupils are equal, round, and reactive to light.  No scleral icterus.    Neck:  Neck supple.  No JVD present.    Cardiovascular:  Normal rate, regular rhythm and normal heart sounds with no murmur.  Pulmonary/Chest:  No respiratory distress, no wheezes, no crackles, with normal breath sounds and good air movement.  Abdominal:  Soft.  Bowel sounds are normal.  No distension and no tenderness.   Musculoskeletal:  LLE foot with mild edema.  LLE lateral ankle with wound vac in place.    Neurological:  Alert and oriented to person, place, and time.  No cranial nerve deficit.  No tongue deviation.  No facial droop.  No slurred speech.   Skin: LLE lateral wound vac in place.  ----------------------------------------------------------------------------------------------------------------------  Tele:  SR in the  80s-90s  ----------------------------------------------------------------------------------------------------------------------        Results from last 7 days  Lab Units 08/09/18  0150 08/08/18  1153   CRP mg/dL 16.15* 19.24*   LACTATE mmol/L  --  1.2   WBC 10*3/mm3 18.27* 21.04*   HEMOGLOBIN g/dL 11.1* 13.3   HEMATOCRIT % 32.5* 38.9   MCV fL 88.1 88.2   MCHC g/dL 34.2 34.2   PLATELETS 10*3/mm3 422* 440*           Results from last 7 days  Lab Units 08/09/18  0150 08/08/18  1153   SODIUM mmol/L 137 135   POTASSIUM mmol/L 3.9 3.9   MAGNESIUM mg/dL  --  2.2   CHLORIDE mmol/L 104 102   CO2 mmol/L 20.1* 22.3*   BUN mg/dL 10 9   CREATININE mg/dL 0.56 0.64   EGFR IF NONAFRICN AM mL/min/1.73 123 106   CALCIUM mg/dL 8.3 9.0   GLUCOSE mg/dL 266* 275*   ALBUMIN g/dL 3.30* 4.10   BILIRUBIN mg/dL 0.4 0.6   ALK PHOS U/L 89 110*   AST (SGOT) U/L 19 16   ALT (SGPT) U/L 19 16   Estimated Creatinine Clearance: 142.6 mL/min (by C-G formula based on SCr of 0.56 mg/dL).  No results found for: AMMONIA      Blood Culture   Date Value Ref Range Status   08/08/2018 No growth at less than 24 hours  Preliminary   08/08/2018 No growth at less than 24 hours  Preliminary              ----------------------------------------------------------------------------------------------------------------------  Imaging Results (last 24 hours)     Procedure Component Value Units Date/Time    XR Chest 1 View [[id number]] Collected:  08/08/18 1242     Updated:  08/08/18 1250    Narrative:       EXAMINATION: XR CHEST 1 VW-      CLINICAL INDICATION:     Simple sepsis protocol     TECHNIQUE:  XR CHEST 1 VW-      COMPARISON: NONE      FINDINGS:        Lungs are aerated.   Heart size, mediastinum, and pulmonary vascularity are unremarkable.   No pneumothorax.   No effusions.   No acute osseous findings.            Impression:       No radiographic evidence of acute cardiac or pulmonary  disease.     This report was finalized on 8/8/2018 12:42 PM by Dr. Erlin FLYNN  MD Arlette.       XR Ankle 3+ View Left [[id number]] Collected:  08/08/18 1242     Updated:  08/08/18 1250    Narrative:       EXAMINATION: XR ANKLE 3+ VW LEFT-      CLINICAL INDICATION:infection        COMPARISON: None      TECHNIQUE: 3 views left ankle     FINDINGS:   Marked degree of soft tissue swelling overlying the distal fibula. No  dystrophic calcifications identified. No periosteal reaction is noted.  No evidence of ankle effusion.           Impression:       Extensive soft tissue swelling overlying the lateral malleolus. No  destructive osseous changes identified.     This report was finalized on 8/8/2018 12:43 PM by Dr. Erlin Chong MD.             Microbiology Results (last 10 days)     Procedure Component Value - Date/Time    Wound Culture - Wound, Ankle, Left [[id number]] Collected:  08/08/18 1856    Lab Status:  Preliminary result Specimen:  Wound from Ankle, Left Updated:  08/09/18 0428     Gram Stain Result Many (4+) WBCs seen      Moderate (3+) Gram positive cocci in pairs and clusters    Blood Culture - Blood, [[id number]]  (Normal) Collected:  08/08/18 1153    Lab Status:  Preliminary result Specimen:  Blood from Arm, Left Updated:  08/09/18 0032     Blood Culture No growth at less than 24 hours    Blood Culture - Blood, [[id number]]  (Normal) Collected:  08/08/18 1140    Lab Status:  Preliminary result Specimen:  Blood from Arm, Right Updated:  08/09/18 0032     Blood Culture No growth at less than 24 hours    Culture, Routine - Swab, Ankle, Left [[id number]]  (Abnormal) Collected:  08/08/18 1123    Lab Status:  Preliminary result Specimen:  Swab from Ankle, Left Updated:  08/09/18 0744     Culture Scant growth (1+) Gram positive cocci, consistent with Staph spp (A)     Gram Stain Result No WBCs or organisms seen            ----------------------------------------------------------------------------------------------------------------------  Assessment and Plan:    -Sepsis on admission with LLE  cellulitis and LLE lateral abscess: ID following with Zosyn changed to high dose Rocephin.  IV Vancomycin continued.  Initial swab with growth of gram positive cocci thus far. Intraoperative culture is pending at present. Blood cultures are unremarkable thus far. C-RP and WBC improving.  No fevers since last evening. Blood pressures improving.  Continue IV fluids and will follow cultures. ID input appreciated.  Orthopedics input appreciated.     -Hyperglycemia in the setting of newly diagnosed diabetes mellitus type 2: FSBG ACHS added with low-dose sliding scale insulin. 10 units of Levemir has been added for this evening. Consistent carbohydrate diet ordered.  Diabetes educator consultation has been placed given newly diagnosed diabetes.  Hemoglobin a1c was found to be 10.6%.  Discuss importance of blood glucose control in wound healing.      -Very mildly elevated anion gap: Will repeat afternoon BMP and follow.      -Diarrhea, resolved: Studies pending at present but has had no further events.     -UTI vs. Contaminant:  IV abx as previously outlined.  Urine culture pending.      -DVT prophylaxis: SQ Heparin      Medication changes:   -Low dose sliding scale insulin added.   -Levemir 10 units added nightly.     The patient is high risk due to the following diagnoses/reasons:  Sepsis with LLE cellulitis and abscess    Birgit Galloway PA-C  08/09/18  10:52 AM

## 2018-08-09 NOTE — PROGRESS NOTES
Inpatient Progress Note  Cynthia Ríos  Date: 08/09/18  MRN: [id number]      Subjective: POD 1 of left ankle I&D with wound vac placement.  Pt states still has some pain but is tolerable with medications.  Pt also states having some low grade temp.  Pt denies chest pain or shortness of breath.         Objective:    Vitals:    08/09/18 0042 08/09/18 0300 08/09/18 0725 08/09/18 1041   BP: 108/63 102/56 148/79 139/79   BP Location: Left arm Left arm Left arm Left arm   Patient Position: Lying Lying Lying Lying   Pulse: 99 105 72 90   Resp: 15 16 18 18   Temp: 99.1 °F (37.3 °C) 99 °F (37.2 °C) 98.8 °F (37.1 °C) 98.6 °F (37 °C)   TempSrc: Oral Oral Oral Axillary   SpO2: 94% 95%     Weight:       Height:              Physical Exam:  Constitutional:  Well-developed and well-nourished.  No respiratory distress.      HENT:  Head: Normocephalic and atraumatic.  Mouth:  Moist mucous membranes.    Eyes:  Conjunctivae and EOM are normal.  Pupils are equal, round, and reactive to light.  No scleral icterus.  Neck:  Neck supple.  No JVD present.    Cardiovascular:  Normal rate, regular rhythm and normal heart sounds with no murmur.  Pulmonary/Chest:  No respiratory distress, no wheezes, no crackles, with normal breath sounds and good air movement.  Abdominal:  Soft.  Bowel sounds are normal.  No distension and no tenderness.   Musculoskeletal:  Edema noted to the left ankle and foot.  Active rom of the left foot digits, +pp cap refill brisk less than 2 sec.   Neurological:  Alert and oriented to person, place, and time.  No cranial nerve deficit.  No tongue deviation.  No facial droop.  No slurred speech.   Skin:  Wound vac in place to the left lateral ankle   Psychiatric:  Normal mood and affect.  Behavior is normal.  Judgment and thought content normal.   Peripheral vascular:  No edema and strong pulses on all 4 extremities.      Labs:      Results from last 7 days  Lab Units 08/09/18  0150 08/08/18  1153   CRP mg/dL 16.15*  19.24*   LACTATE mmol/L  --  1.2   WBC 10*3/mm3 18.27* 21.04*   HEMOGLOBIN g/dL 11.1* 13.3   HEMATOCRIT % 32.5* 38.9   MCV fL 88.1 88.2   MCHC g/dL 34.2 34.2   PLATELETS 10*3/mm3 422* 440*           Results from last 7 days  Lab Units 08/09/18  0150 08/08/18  1153   SODIUM mmol/L 137 135   POTASSIUM mmol/L 3.9 3.9   MAGNESIUM mg/dL  --  2.2   CHLORIDE mmol/L 104 102   CO2 mmol/L 20.1* 22.3*   BUN mg/dL 10 9   CREATININE mg/dL 0.56 0.64   EGFR IF NONAFRICN AM mL/min/1.73 123 106   CALCIUM mg/dL 8.3 9.0   GLUCOSE mg/dL 266* 275*   ALBUMIN g/dL 3.30* 4.10   BILIRUBIN mg/dL 0.4 0.6   ALK PHOS U/L 89 110*   AST (SGOT) U/L 19 16   ALT (SGPT) U/L 19 16   Estimated Creatinine Clearance: 142.6 mL/min (by C-G formula based on SCr of 0.56 mg/dL).  No results found for: AMMONIA          Hemoglobin A1C   Date Value Ref Range Status   08/08/2018 10.60 (H) 4.50 - 5.70 % Final     Lab Results   Component Value Date    TSH 1.031 08/08/2018         Pain Management Panel     Pain Management Panel Latest Ref Rng & Units 8/8/2018    AMPHETAMINES SCREEN, URINE Negative Negative    BARBITURATES SCREEN Negative Negative    BENZODIAZEPINE SCREEN, URINE Negative Negative    BUPRENORPHINE Negative Negative    COCAINE SCREEN, URINE Negative Negative    METHADONE SCREEN, URINE Negative Negative          Blood Culture   Date Value Ref Range Status   08/08/2018 No growth at 24 hours  Preliminary   08/08/2018 No growth at 24 hours  Preliminary     Urine Culture   Date Value Ref Range Status   08/08/2018 Normal Urogenital Kelle  Preliminary                       Radiology:  Imaging Results (last 72 hours)     Procedure Component Value Units Date/Time    XR Chest 1 View [[id number]] Collected:  08/08/18 1242     Updated:  08/08/18 1250    Narrative:       EXAMINATION: XR CHEST 1 VW-      CLINICAL INDICATION:     Simple sepsis protocol     TECHNIQUE:  XR CHEST 1 VW-      COMPARISON: NONE      FINDINGS:        Lungs are aerated.   Heart size,  mediastinum, and pulmonary vascularity are unremarkable.   No pneumothorax.   No effusions.   No acute osseous findings.            Impression:       No radiographic evidence of acute cardiac or pulmonary  disease.     This report was finalized on 8/8/2018 12:42 PM by Dr. Erlin Chong MD.       XR Ankle 3+ View Left [[id number]] Collected:  08/08/18 1242     Updated:  08/08/18 1250    Narrative:       EXAMINATION: XR ANKLE 3+ VW LEFT-      CLINICAL INDICATION:infection        COMPARISON: None      TECHNIQUE: 3 views left ankle     FINDINGS:   Marked degree of soft tissue swelling overlying the distal fibula. No  dystrophic calcifications identified. No periosteal reaction is noted.  No evidence of ankle effusion.           Impression:       Extensive soft tissue swelling overlying the lateral malleolus. No  destructive osseous changes identified.     This report was finalized on 8/8/2018 12:43 PM by Dr. Erlin Chong MD.               Assessment: s/p left ankle I&D with wound vac          Plan: Pt is to continue post op ortho orders.  Recommend pt to be on IV antibiotics x 6 weeks.  If discharged pt is to follow up with Dr. Artis in 10 - 14 days.        Everton Rosado, RADHA August 9, 2018 2:18 PM

## 2018-08-09 NOTE — PLAN OF CARE
Problem: Patient Care Overview  Goal: Discharge Needs Assessment  Outcome: Ongoing (interventions implemented as appropriate)  Discharge Planning Assessment   [place]     Patient Name: Cynthia Ríos  MRN: [id number]  Today's Date: 8/9/2018    Admit Date: 8/8/2018          Discharge Needs Assessment     Row Name 08/09/18 1728       Living Environment    Lives With alone    Primary Care Provided by self    Provides Primary Care For no one    Family Caregiver if Needed none    Quality of Family Relationships other (see comments)   Pt states having no family support.     Able to Return to Prior Arrangements yes       Transition Planning    Transportation Anticipated car, drives self       Discharge Needs Assessment    Equipment Currently Used at Home none    Outpatient/Agency/Support Group Needs --   Pt does not utilize home health services. Pt had wound vac placed after surgery. Pt does not have a PCP, therefore will be unable to receive home health services. SS discussed options with pt including coming to [facility] infusion clinic for wound vac dressing    Discharge Facility/Level of Care Needs --   changes. Pt states she is employed and travels for work.             Discharge Plan     Row Name 08/09/18 1739       Plan    Plan Pt lives at home alone and plans to return home at discharge. Pt does not utilize DME or home health services. Pt does not have a PCP. Pt utilizes [facility] and does not issues with copays. Pt does not have POA or living will. Pt had wound vac placed after surgery. SS discussed options for wound vac dressing changes with pt including [facility] infusion clinic due to pt having no PCP. SS to follow and assist as needed with discharge planning.     Patient/Family in Agreement with Plan yes          Demographic Summary     Row Name 08/09/18 1721       General Information    Referral Source nursing    Reason for Consult --   SS received consult d/c planning; has wound VAC post op. SS spoke to pt.       Nga Silvestre

## 2018-08-10 LAB
ALBUMIN SERPL-MCNC: 2.8 G/DL (ref 3.5–5)
ALBUMIN/GLOB SERPL: 1 G/DL (ref 1.5–2.5)
ALP SERPL-CCNC: 112 U/L (ref 35–104)
ALT SERPL W P-5'-P-CCNC: 53 U/L (ref 10–36)
ANION GAP SERPL CALCULATED.3IONS-SCNC: 7.6 MMOL/L (ref 3.6–11.2)
AST SERPL-CCNC: 69 U/L (ref 10–30)
BACTERIA SPEC AEROBE CULT: ABNORMAL
BACTERIA SPEC AEROBE CULT: NORMAL
BASOPHILS # BLD AUTO: 0.04 10*3/MM3 (ref 0–0.3)
BASOPHILS NFR BLD AUTO: 0.4 % (ref 0–2)
BILIRUB SERPL-MCNC: 0.2 MG/DL (ref 0.2–1.8)
BUN BLD-MCNC: 8 MG/DL (ref 7–21)
BUN/CREAT SERPL: 17.8 (ref 7–25)
C PEPTIDE SERPL-MCNC: 5.9 NG/ML (ref 1.1–4.4)
CALCIUM SPEC-SCNC: 7.9 MG/DL (ref 7.7–10)
CHLORIDE SERPL-SCNC: 108 MMOL/L (ref 99–112)
CO2 SERPL-SCNC: 22.4 MMOL/L (ref 24.3–31.9)
CREAT BLD-MCNC: 0.45 MG/DL (ref 0.43–1.29)
CRP SERPL-MCNC: 12.45 MG/DL (ref 0–0.99)
DEPRECATED RDW RBC AUTO: 39.2 FL (ref 37–54)
EOSINOPHIL # BLD AUTO: 0.25 10*3/MM3 (ref 0–0.7)
EOSINOPHIL # BLD MANUAL: 0.68 10*3/MM3 (ref 0–0.7)
EOSINOPHIL NFR BLD AUTO: 2.2 % (ref 0–5)
EOSINOPHIL NFR BLD MANUAL: 6 % (ref 0–5)
ERYTHROCYTE [DISTWIDTH] IN BLOOD BY AUTOMATED COUNT: 12.3 % (ref 11.5–14.5)
GFR SERPL CREATININE-BSD FRML MDRD: >150 ML/MIN/1.73
GLOBULIN UR ELPH-MCNC: 2.7 GM/DL
GLUCOSE BLD-MCNC: 268 MG/DL (ref 70–110)
GLUCOSE BLDC GLUCOMTR-MCNC: 199 MG/DL (ref 70–130)
GLUCOSE BLDC GLUCOMTR-MCNC: 214 MG/DL (ref 70–130)
GLUCOSE BLDC GLUCOMTR-MCNC: 230 MG/DL (ref 70–130)
GLUCOSE BLDC GLUCOMTR-MCNC: 241 MG/DL (ref 70–130)
GRAM STN SPEC: ABNORMAL
HAV IGM SERPL QL IA: NORMAL
HBV CORE IGM SERPL QL IA: NORMAL
HBV SURFACE AG SERPL QL IA: NORMAL
HCT VFR BLD AUTO: 31.5 % (ref 37–47)
HCV AB SER DONR QL: NORMAL
HGB BLD-MCNC: 10.5 G/DL (ref 12–16)
IMM GRANULOCYTES # BLD: 0.12 10*3/MM3 (ref 0–0.03)
IMM GRANULOCYTES NFR BLD: 1.1 % (ref 0–0.5)
LYMPHOCYTES # BLD AUTO: 3.04 10*3/MM3 (ref 1–3)
LYMPHOCYTES # BLD MANUAL: 2.83 10*3/MM3 (ref 1–3)
LYMPHOCYTES NFR BLD AUTO: 26.9 % (ref 21–51)
LYMPHOCYTES NFR BLD MANUAL: 1 % (ref 0–10)
LYMPHOCYTES NFR BLD MANUAL: 25 % (ref 21–51)
MCH RBC QN AUTO: 29.4 PG (ref 27–33)
MCHC RBC AUTO-ENTMCNC: 33.3 G/DL (ref 33–37)
MCV RBC AUTO: 88.2 FL (ref 80–94)
MONOCYTES # BLD AUTO: 0.11 10*3/MM3 (ref 0.1–0.9)
MONOCYTES # BLD AUTO: 0.86 10*3/MM3 (ref 0.1–0.9)
MONOCYTES NFR BLD AUTO: 7.6 % (ref 0–10)
NEUTROPHILS # BLD AUTO: 7.01 10*3/MM3 (ref 1.4–6.5)
NEUTROPHILS # BLD AUTO: 7.7 10*3/MM3 (ref 1.4–6.5)
NEUTROPHILS NFR BLD AUTO: 61.8 % (ref 30–70)
NEUTROPHILS NFR BLD MANUAL: 66 % (ref 30–70)
NEUTS BAND NFR BLD MANUAL: 2 % (ref 4–12)
OSMOLALITY SERPL CALC.SUM OF ELEC: 283.4 MOSM/KG (ref 273–305)
PLAT MORPH BLD: NORMAL
PLATELET # BLD AUTO: 374 10*3/MM3 (ref 130–400)
PMV BLD AUTO: 9.2 FL (ref 6–10)
POTASSIUM BLD-SCNC: 3.4 MMOL/L (ref 3.5–5.3)
POTASSIUM BLD-SCNC: 3.9 MMOL/L (ref 3.5–5.3)
PROT SERPL-MCNC: 5.5 G/DL (ref 6–8)
RBC # BLD AUTO: 3.57 10*6/MM3 (ref 4.2–5.4)
RBC MORPH BLD: NORMAL
SODIUM BLD-SCNC: 138 MMOL/L (ref 135–153)
VANCOMYCIN TROUGH SERPL-MCNC: 7 MCG/ML (ref 5–15)
WBC NRBC COR # BLD: 11.32 10*3/MM3 (ref 4.5–12.5)

## 2018-08-10 PROCEDURE — 63710000001 INSULIN ASPART PER 5 UNITS: Performed by: PHYSICIAN ASSISTANT

## 2018-08-10 PROCEDURE — 25010000002 MORPHINE PER 10 MG: Performed by: INTERNAL MEDICINE

## 2018-08-10 PROCEDURE — 80074 ACUTE HEPATITIS PANEL: CPT | Performed by: PHYSICIAN ASSISTANT

## 2018-08-10 PROCEDURE — 82962 GLUCOSE BLOOD TEST: CPT

## 2018-08-10 PROCEDURE — 86140 C-REACTIVE PROTEIN: CPT | Performed by: PHYSICIAN ASSISTANT

## 2018-08-10 PROCEDURE — 25010000002 HEPARIN (PORCINE) PER 1000 UNITS: Performed by: PHYSICIAN ASSISTANT

## 2018-08-10 PROCEDURE — 85025 COMPLETE CBC W/AUTO DIFF WBC: CPT | Performed by: PHYSICIAN ASSISTANT

## 2018-08-10 PROCEDURE — 80202 ASSAY OF VANCOMYCIN: CPT | Performed by: INTERNAL MEDICINE

## 2018-08-10 PROCEDURE — 63710000001 INSULIN DETEMIR PER 5 UNITS: Performed by: INTERNAL MEDICINE

## 2018-08-10 PROCEDURE — 84132 ASSAY OF SERUM POTASSIUM: CPT | Performed by: INTERNAL MEDICINE

## 2018-08-10 PROCEDURE — 94799 UNLISTED PULMONARY SVC/PX: CPT

## 2018-08-10 PROCEDURE — 80053 COMPREHEN METABOLIC PANEL: CPT | Performed by: PHYSICIAN ASSISTANT

## 2018-08-10 PROCEDURE — 85007 BL SMEAR W/DIFF WBC COUNT: CPT | Performed by: PHYSICIAN ASSISTANT

## 2018-08-10 PROCEDURE — 99232 SBSQ HOSP IP/OBS MODERATE 35: CPT | Performed by: PHYSICIAN ASSISTANT

## 2018-08-10 PROCEDURE — 25010000002 CEFTRIAXONE: Performed by: NURSE PRACTITIONER

## 2018-08-10 RX ORDER — FAMOTIDINE 20 MG/1
20 TABLET, FILM COATED ORAL DAILY
Status: DISCONTINUED | OUTPATIENT
Start: 2018-08-10 | End: 2018-08-15 | Stop reason: HOSPADM

## 2018-08-10 RX ORDER — MAGNESIUM SULFATE HEPTAHYDRATE 40 MG/ML
2 INJECTION, SOLUTION INTRAVENOUS AS NEEDED
Status: DISCONTINUED | OUTPATIENT
Start: 2018-08-10 | End: 2018-08-15 | Stop reason: HOSPADM

## 2018-08-10 RX ORDER — MORPHINE SULFATE 2 MG/ML
2 INJECTION, SOLUTION INTRAMUSCULAR; INTRAVENOUS EVERY 4 HOURS PRN
Status: DISCONTINUED | OUTPATIENT
Start: 2018-08-10 | End: 2018-08-15 | Stop reason: HOSPADM

## 2018-08-10 RX ORDER — POTASSIUM CHLORIDE 7.45 MG/ML
10 INJECTION INTRAVENOUS
Status: DISCONTINUED | OUTPATIENT
Start: 2018-08-10 | End: 2018-08-15 | Stop reason: HOSPADM

## 2018-08-10 RX ORDER — POTASSIUM CHLORIDE 20 MEQ/1
40 TABLET, EXTENDED RELEASE ORAL EVERY 4 HOURS
Status: COMPLETED | OUTPATIENT
Start: 2018-08-10 | End: 2018-08-10

## 2018-08-10 RX ORDER — POTASSIUM CHLORIDE 750 MG/1
40 CAPSULE, EXTENDED RELEASE ORAL AS NEEDED
Status: DISCONTINUED | OUTPATIENT
Start: 2018-08-10 | End: 2018-08-15 | Stop reason: HOSPADM

## 2018-08-10 RX ORDER — MAGNESIUM SULFATE HEPTAHYDRATE 40 MG/ML
4 INJECTION, SOLUTION INTRAVENOUS AS NEEDED
Status: DISCONTINUED | OUTPATIENT
Start: 2018-08-10 | End: 2018-08-15 | Stop reason: HOSPADM

## 2018-08-10 RX ORDER — IBUPROFEN 200 MG
200 TABLET ORAL EVERY 4 HOURS PRN
Status: DISCONTINUED | OUTPATIENT
Start: 2018-08-10 | End: 2018-08-15 | Stop reason: HOSPADM

## 2018-08-10 RX ORDER — POTASSIUM CHLORIDE 1.5 G/1.77G
40 POWDER, FOR SOLUTION ORAL AS NEEDED
Status: DISCONTINUED | OUTPATIENT
Start: 2018-08-10 | End: 2018-08-15 | Stop reason: HOSPADM

## 2018-08-10 RX ADMIN — INSULIN ASPART 4 UNITS: 100 INJECTION, SOLUTION INTRAVENOUS; SUBCUTANEOUS at 08:42

## 2018-08-10 RX ADMIN — INSULIN ASPART 5 UNITS: 100 INJECTION, SOLUTION INTRAVENOUS; SUBCUTANEOUS at 17:48

## 2018-08-10 RX ADMIN — CEFTRIAXONE 2 G: 2 INJECTION, POWDER, FOR SOLUTION INTRAMUSCULAR; INTRAVENOUS at 17:47

## 2018-08-10 RX ADMIN — HEPARIN SODIUM 5000 UNITS: 5000 INJECTION, SOLUTION INTRAVENOUS; SUBCUTANEOUS at 08:43

## 2018-08-10 RX ADMIN — POTASSIUM CHLORIDE 40 MEQ: 1500 TABLET, EXTENDED RELEASE ORAL at 12:33

## 2018-08-10 RX ADMIN — Medication 1 CAPSULE: at 08:43

## 2018-08-10 RX ADMIN — INSULIN ASPART 4 UNITS: 100 INJECTION, SOLUTION INTRAVENOUS; SUBCUTANEOUS at 19:40

## 2018-08-10 RX ADMIN — POTASSIUM CHLORIDE 40 MEQ: 1500 TABLET, EXTENDED RELEASE ORAL at 08:49

## 2018-08-10 RX ADMIN — INSULIN DETEMIR 20 UNITS: 100 INJECTION, SOLUTION SUBCUTANEOUS at 19:40

## 2018-08-10 RX ADMIN — INSULIN ASPART 4 UNITS: 100 INJECTION, SOLUTION INTRAVENOUS; SUBCUTANEOUS at 12:30

## 2018-08-10 RX ADMIN — INSULIN ASPART 5 UNITS: 100 INJECTION, SOLUTION INTRAVENOUS; SUBCUTANEOUS at 12:31

## 2018-08-10 RX ADMIN — INSULIN ASPART 2 UNITS: 100 INJECTION, SOLUTION INTRAVENOUS; SUBCUTANEOUS at 17:47

## 2018-08-10 RX ADMIN — MORPHINE SULFATE 2 MG: 2 INJECTION, SOLUTION INTRAMUSCULAR; INTRAVENOUS at 11:22

## 2018-08-10 RX ADMIN — SODIUM CHLORIDE 125 ML/HR: 9 INJECTION, SOLUTION INTRAVENOUS at 09:57

## 2018-08-10 RX ADMIN — FAMOTIDINE 20 MG: 20 TABLET, FILM COATED ORAL at 12:33

## 2018-08-10 NOTE — PLAN OF CARE
Problem: Patient Care Overview  Goal: Plan of Care Review  Outcome: Ongoing (interventions implemented as appropriate)    Goal: Interprofessional Rounds/Family Conf  Outcome: Ongoing (interventions implemented as appropriate)   08/10/18 1836   Interdisciplinary Rounds/Family Conf   Participants nursing       Problem: Pain, Acute (Adult)  Goal: Acceptable Pain Control/Comfort Level  Outcome: Ongoing (interventions implemented as appropriate)   08/09/18 1539   Pain, Acute (Adult)   Acceptable Pain Control/Comfort Level making progress toward outcome       Problem: Wound (Includes Pressure Injury) (Adult)  Goal: Signs and Symptoms of Listed Potential Problems Will be Absent, Minimized or Managed (Wound)  Outcome: Ongoing (interventions implemented as appropriate)   08/09/18 1539   Goal/Outcome Evaluation   Problems Assessed (Wound) all   Problems Present (Wound) infection;pain       Problem: Skin Injury Risk (Adult)  Goal: Skin Health and Integrity  Outcome: Ongoing (interventions implemented as appropriate)   08/09/18 1539   Skin Injury Risk (Adult)   Skin Health and Integrity making progress toward outcome       Problem: Diabetes, Type 2 (Adult)  Goal: Signs and Symptoms of Listed Potential Problems Will be Absent, Minimized or Managed (Diabetes, Type 2)  Outcome: Ongoing (interventions implemented as appropriate)   08/09/18 1539   Goal/Outcome Evaluation   Problems Assessed (Type 2 Diabetes) all   Problems Present (Type 2 Diabetes) hyperglycemia

## 2018-08-10 NOTE — PROGRESS NOTES
Discharge Planning Assessment  [facility]     Patient Name: Cynthia Ríos  MRN: [id number]  Today's Date: 8/10/2018    Admit Date: 8/8/2018      Discharge Plan     Row Name 08/10/18 1649       Plan    Plan SS spoke to pt on Thursday, 8-9-18. Pt lives at home alone and plans to return home at discharge. Pt had a wound vac placed after surgery. Pt does not have a PCP.  nurseKerry spoke to pt today regarding Dr. Indio Hodge in [place] at [facility] and she is agreeable for an appointment to made with him at discharge. [place], Radha was made aware. SS discussed options for wound vac dressing changes with pt including [facility] infusion clinic due to pt having no PCP. Pt does not utilize home health services or DME. SS to follow.         gNa Silvestre

## 2018-08-10 NOTE — NURSING NOTE
NPWT VAC dressing change complete. Edges of wound noted to be darkly discolored.  See photo.       08/10/18 1200   Wound 08/08/18 1445 Left other (see comments) ankle abscess   Date first assessed/Time first assessed: 08/08/18 1445   Present On Admission : yes  Side: Left  Orientation: (c) other (see comments)  Location: ankle  Type: abscess   Wound Image    Base moist;bleeding   Periwound ecchymotic;macerated   Periwound Temperature warm   Periwound Skin Turgor firm   Edges irregular   Drainage Characteristics/Odor serous   Drainage Amount moderate   NPWT (Negative Pressure Wound Therapy) 08/08/18 Left lateral ankle   Placement Date: 08/08/18   Location: Left lateral ankle   Therapy Setting continuous therapy   Dressing foam, black   Pressure Setting 125 mmHg   Sponges Inserted 2   Sponges Removed 2   Finger sweep complete Yes

## 2018-08-10 NOTE — PLAN OF CARE
Problem: Patient Care Overview  Goal: Plan of Care Review  Outcome: Ongoing (interventions implemented as appropriate)    Goal: Individualization and Mutuality  Outcome: Ongoing (interventions implemented as appropriate)    Goal: Interprofessional Rounds/Family Conf  Outcome: Ongoing (interventions implemented as appropriate)      Problem: Pain, Acute (Adult)  Goal: Acceptable Pain Control/Comfort Level  Outcome: Ongoing (interventions implemented as appropriate)      Problem: Wound (Includes Pressure Injury) (Adult)  Goal: Signs and Symptoms of Listed Potential Problems Will be Absent, Minimized or Managed (Wound)  Outcome: Ongoing (interventions implemented as appropriate)      Problem: Skin Injury Risk (Adult)  Goal: Skin Health and Integrity  Outcome: Ongoing (interventions implemented as appropriate)      Problem: Diabetes, Type 2 (Adult)  Goal: Signs and Symptoms of Listed Potential Problems Will be Absent, Minimized or Managed (Diabetes, Type 2)  Outcome: Ongoing (interventions implemented as appropriate)

## 2018-08-10 NOTE — CONSULTS
"Diabetes Education  Assessment/Teaching    Patient Name:  Cynthia Ríos  YOB: 1985  MRN: [id number]  Admit Date:  8/8/2018      Assessment Date:  8/10/2018    Most Recent Value   General Information    Height  162.6 cm (64\")   Height Method  Stated   Weight  79 kg (174 lb 1.6 oz)   Weight Method  Bed scale   Pregnancy Assessment   Diabetes History   What type of diabetes do you have?  Other (comment) [\"diagnosed with PCOS when I was 6 years old\"]   Current DM knowledge  fair [says \" on both sides of my family\" when ask about diabetes]   Have you had diabetes education/teaching in the past?  no   Do you test your blood sugar at home?  yes   Frequency of checks  experience with parent assisting with her glucometer checks   What makes it difficult for you to take care of your diabetes or yourself?  healthy and do not have a time to take care of myself, discussed briefly   Do you have any diabetes complications?  other (comment), foot ulcers [wound vac]   Education Preferences   Nutrition Information   What type of support do you currently use to help you with your health issues?  trifold at bedside and client says understood   Assessment Topics   Healthy Eating - Assessment  Competent   Being Active - Assessment  Competent   Taking Medication - Assessment  Competent   Problem Solving - Assessment  Competent   Reducing Risk - Assessment  Competent   DM Goals            Most Recent Value   DM Education Needs   Meter  Needs meter   Medication  Pen, Insulin [glucophage discussed]   Problem Solving  -- [healing]   Reducing Risks  A1C testing [A1c 10.2]   Healthy Eating  Other (comment)   Physical Activity  Other (comment)   Healthy Coping  Appropriate   Discharge Plan  Home   Motivation  Engaged   Teaching Method  Explanation, Discussion, Handouts   Patient Response  Verbalized understanding            Other Comments:  Reviewed Levemir & novolog with picture guide provided and discussed and client says " familiar encouraged call if needs change        Electronically signed by:  Ciara Wells RN  08/10/18 3:45 PM

## 2018-08-10 NOTE — PROGRESS NOTES
Patient Identification:  Name:  Cynthia Ríos  Age:  33 y.o.  Sex:  female  :  1985  MRN:  [id number]  Visit Number:  [id number]  Primary Care Provider:  Provider, No Known    Length of stay:  2    HPI:     33-year-old  female admitted yesterday after presentation to the ED with left lower extremity lateral cellulitis with possible abscess of left lateral malleolus. She was admitted with broad spectrum IV abx treatment and orthopedic consultation with plans for I&D.  ID and orthopedics were consulted.  Prior to admission on 18, she did not know she had diabetes mellitus but reports strong family history.      Consultants:   -Orthopedics with Dr. Artis  -Infectious Disease team     Procedures:  -I&D with excisional debridement of skin and soft tissue and wound vac placement on 18 by Dr. Artis with debridement of [id number] wound    Subjective:      Mrs. Ríos is resting comfortably in bed on this date.  She reports some back pain with laying in bed.  She reports some pain at the site of her wound vac but feels she is doing well overall. We discuss current pending cultures.        ----------------------------------------------------------------------------------------------------------------------  Current [facility] Meds:    ceftriaxone 2 g Intravenous Q24H   famotidine 20 mg Oral Daily   heparin (porcine) 5,000 Units Subcutaneous Q12H   insulin aspart 0-9 Units Subcutaneous 4x Daily AC & at Bedtime   insulin aspart 5 Units Subcutaneous TID With Meals   insulin detemir 15 Units Subcutaneous Nightly   lactobacillus acidophilus 1 capsule Oral Daily   potassium chloride 40 mEq Oral Q4H   vancomycin 1,250 mg Intravenous Q12H       sodium chloride 75 mL/hr Last Rate: 125 mL/hr (08/10/18 0957)     ----------------------------------------------------------------------------------------------------------------------  Vital Signs:  Temp:  [98.1 °F (36.7 °C)-98.9 °F (37.2 °C)] 98.6 °F (37 °C)  Heart Rate:   [85-99] 87  Resp:  [18-19] 18  BP: (110-138)/(73-86) 138/81  1    08/08/18  1044 08/08/18  1425   Weight: 79.4 kg (175 lb) 79 kg (174 lb 1.6 oz)     Body mass index is 29.88 kg/m².    Intake/Output Summary (Last 24 hours) at 08/10/18 1050  Last data filed at 08/10/18 0919   Gross per 24 hour   Intake             1320 ml   Output                0 ml   Net             1320 ml     I/O this shift:  In: 480 [P.O.:480]  Out: -   Diet Regular; Consistent Carbohydrate  ----------------------------------------------------------------------------------------------------------------------  Physical exam:  Constitutional:  Well-developed and well-nourished.  No respiratory distress.  Hirsut appearing    HENT:  Head:  Normocephalic and atraumatic.  Mouth:  Moist mucous membranes.    Eyes:  Conjunctivae and EOM are normal.  Pupils are equal, round, and reactive to light.  No scleral icterus.    Neck:  Neck supple.  No JVD present.    Cardiovascular:  Normal rate, regular rhythm and normal heart sounds with no murmur.  Pulmonary/Chest:  No respiratory distress, no wheezes, no crackles, with normal breath sounds and good air movement.  Abdominal:  Soft.  Bowel sounds are normal.  No distension and no tenderness.   Musculoskeletal:  LLE lateral ankle with wound vac in place.  Appears to have adequate suction.  NV intact.  RLE unremarkable.   Neurological:  Alert and oriented to person, place, and time.  No cranial nerve deficit.  No tongue deviation.  No facial droop.  No slurred speech.   Skin:  Skin is warm and dry. No rash noted. No pallor.   ----------------------------------------------------------------------------------------------------------------------  Tele:  SR in the 80s. Will discontinue telemetry.    ----------------------------------------------------------------------------------------------------------------------        Results from last 7 days  Lab Units 08/10/18  0218 08/09/18  0150 08/08/18  1153   CRP mg/dL  12.45* 16.15* 19.24*   LACTATE mmol/L  --   --  1.2   WBC 10*3/mm3 11.32 18.27* 21.04*   HEMOGLOBIN g/dL 10.5* 11.1* 13.3   HEMATOCRIT % 31.5* 32.5* 38.9   MCV fL 88.2 88.1 88.2   MCHC g/dL 33.3 34.2 34.2   PLATELETS 10*3/mm3 374 422* 440*           Results from last 7 days  Lab Units 08/10/18  0218 08/09/18  1325 08/09/18  0150 08/08/18  1153   SODIUM mmol/L 138 139 137 135   POTASSIUM mmol/L 3.4* 3.5 3.9 3.9   MAGNESIUM mg/dL  --   --   --  2.2   CHLORIDE mmol/L 108 107 104 102   CO2 mmol/L 22.4* 25.1 20.1* 22.3*   BUN mg/dL 8 9 10 9   CREATININE mg/dL 0.45 0.52 0.56 0.64   EGFR IF NONAFRICN AM mL/min/1.73 >150 134 123 106   CALCIUM mg/dL 7.9 8.2 8.3 9.0   GLUCOSE mg/dL 268* 337* 266* 275*   ALBUMIN g/dL 2.80*  --  3.30* 4.10   BILIRUBIN mg/dL 0.2  --  0.4 0.6   ALK PHOS U/L 112*  --  89 110*   AST (SGOT) U/L 69*  --  19 16   ALT (SGPT) U/L 53*  --  19 16   Estimated Creatinine Clearance: 177.4 mL/min (by C-G formula based on SCr of 0.45 mg/dL).  No results found for: AMMONIA      Blood Culture   Date Value Ref Range Status   08/08/2018 No growth at 24 hours  Preliminary   08/08/2018 No growth at 24 hours  Preliminary     Urine Culture   Date Value Ref Range Status   08/08/2018 Normal Urogenital Kelle  Preliminary     Wound Culture   Date Value Ref Range Status   08/08/2018 (A)  Preliminary    Heavy growth (4+) Gram positive cocci, consistent with Staph spp     Microbiology Results (last 10 days)     Procedure Component Value - Date/Time    Wound Culture - Wound, Ankle, Left [[id number]]  (Abnormal) Collected:  08/08/18 1856    Lab Status:  Preliminary result Specimen:  Wound from Ankle, Left Updated:  08/10/18 0742     Wound Culture Heavy growth (4+) Gram positive cocci, consistent with Staph spp (A)     Gram Stain Result Many (4+) WBCs seen      Moderate (3+) Gram positive cocci in pairs and clusters    Urine Culture - Urine, [[id number]] Collected:  08/08/18 1319    Lab Status:  Final result Specimen:  Urine from  Urine, Clean Catch Updated:  08/10/18 0952     Urine Culture 50,000-60,000 CFU/mL Normal Urogenital Kelle    Blood Culture - Blood, [[id number]]  (Normal) Collected:  08/08/18 1153    Lab Status:  Preliminary result Specimen:  Blood from Arm, Left Updated:  08/09/18 1230     Blood Culture No growth at 24 hours    Blood Culture - Blood, [[id number]]  (Normal) Collected:  08/08/18 1140    Lab Status:  Preliminary result Specimen:  Blood from Arm, Right Updated:  08/09/18 1230     Blood Culture No growth at 24 hours    Culture, Routine - Swab, Ankle, Left [[id number]]  (Abnormal)  (Susceptibility) Collected:  08/08/18 1123    Lab Status:  Final result Specimen:  Swab from Ankle, Left Updated:  08/10/18 1004     Culture Scant growth (1+) Staphylococcus aureus (A)     Gram Stain Result No WBCs or organisms seen    Susceptibility      Staphylococcus aureus     SUSSY     Amoxicillin + Clavulanate <=4/2 ug/ml Susceptible     Ampicillin >8 ug/ml Resistant     Ampicillin + Sulbactam <=8/4 ug/ml Susceptible     Ceftriaxone <=8 ug/ml Susceptible     Ciprofloxacin <=1 ug/ml Susceptible     Clindamycin <=0.5 ug/ml Susceptible     Erythromycin <=0.5 ug/ml Susceptible     Gentamicin <=4 ug/ml Susceptible     Levofloxacin <=1 ug/ml Susceptible  [1]      Moxifloxacin <=0.5 ug/ml Susceptible     Oxacillin 1 ug/ml Susceptible     Penicillin G >8 ug/ml Resistant     Rifampin <=1 ug/ml Susceptible     Tetracycline <=4 ug/ml Susceptible     Trimethoprim + Sulfamethoxazole <=0.5/9.5 ug/ml Susceptible     Vancomycin 2 ug/ml Susceptible            [1]   Staphylococcus species may develop resistance during prolonged therapy with quinolones.  Isolates that are initially susceptible may become resistant within three to four days after initiation of therapy. Testing of repeat isolates may be warranted.                             ----------------------------------------------------------------------------------------------------------------------  Imaging Results (last 24 hours)     ** No results found for the last 24 hours. **        ----------------------------------------------------------------------------------------------------------------------  Assessment and Plan:    -Sepsis on admission with LLE cellulitis and LLE lateral abscess s/p I&D with wound vac placement: Continue IV Rocephin and Vancomycin for now.  Intraoperative culture remains pending, though swab from ankle prior demonstrates staphylococcal aureus susceptible to the Rocephin she is currently utilizing.  ID following.  C-RP continues to improve.  WBC normalized.     -Newly diagnosed diabetes mellitus: C-peptide pending. Levemir increased to 15 units nightly.  Meal time regimen started with 5 units TID in addition to continuing current low dose sliding scale PRN. Hemoglobin a1c previously reviewed.  Anion gap is closed. IV fluids decreased to 75 cc/hr for now .      -Hypokalemia: Electrolyte replacement protocol has been added.      -Mild transaminitis: Tylenol discontinued. Will monitor daily. Will add hepatitis panel to AM labs. Possibly 2/2 to Rocephin.        -Diarrhea, resolved with no further episodes: Stool studies have been discontinued.      -Mild normocytic, hypochromic anemia:  Likely dilutional.  Iron panel, ferritin, folate, and vitamin b12 were added as Mrs. Ríos reports that she has been told she has low iron in the past and this usually happens when she is sick ;however, in the past, she is generally someone who only seeks medical treatment when acutely ill, so this may be her norm.     -DVT prophylaxis: SQ heparin   -GI prophylaxis: Pepcid  -Activity: Per ortho 2/2 to recent procedure    -Disposition:  Current orthopedic recommendation includes possibly 6 weeks of IV antibiotic therapy.   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 Discussed possibility of this and travelling work impeding healing process of her LLE.  Given likelihood of several weeks of IV antibiotics in addition to wound vac for unclear duration of time, it was advised that patient will need to be off work for sometime prior to be clearing to return for her job in order to assist with wound healing and overall course of care.      Medication changes made today:   -Increased Levemir to 15 units nightly  -Added 5 units of aspart with meals  -Decreased IV fluids to 75 cc/hr    The patient is high risk due to the following diagnoses/reasons:  Sepsis 2/2 to left lateral abscess,  Newly diagnosed diabetes mellitus    Birgit Galloway PA-C  08/10/18  10:50 AM

## 2018-08-10 NOTE — ACP (ADVANCE CARE PLANNING)
Patient approached for Advance Care Planning discussion, which resulted in the completion of a [place] Living Will today. She elects her brother Eric Oconnor [phone] as her primary Health Care Surrogate and her brothers spouse Dana Oconnor as her secondary HCS. Patient had originally reported a different contact number, but later clarified that the above listed numbers are correct. She is provided education concerning common health related topics requiring surrogacy and was encouraged to discuss her healthcare wishes with family. Please note that a living will and code status are not the same. The patient did verify that her full code status reflects her current wishes. The patient was provided the original document along with 2 copies to provide to her HCS's. Copies were also placed in her folder at the nurses station and in the medical records basket for scanning. Please call with any questions or concerns.    Violette Senior RN, BSN, CHPN  Advance Care Planning Facilitator  Ext: [phone]

## 2018-08-10 NOTE — PROGRESS NOTES
Case Management/Social Work    Patient Name:  Cynthia Ríos  YOB: 1985  MRN: [id number]  Admit Date:  8/8/2018      Pt is newly diagnosed diabetic and has been seen by diabetic education nurse for education and has consult for dietician to see her.  Pt does not have PCP and was provided with [facility] provider list and is agreeable to see provider with [facility] in [place] if they are will accept her as a new patient.  CM made 3N US aware and she will make appt for pt when she is closer to being discharged.  Pt has wound VAC in place to left ankle post op and is on IV abx.  CM will follow with .      Electronically signed by:  Kerry Esteban RN  08/10/18 12:51 PM

## 2018-08-10 NOTE — PAYOR COMM NOTE
"Contact: La Del Toro RN @ [facility]  Phone: [phone]  Fax: [phone]    Ref# [id number]  Inpatient status  Clinical     Cynthia Rizo  (33 y.o. Female)     Date of Birth Social Security Number Address Home Phone MRN    1985  [address] [place] [address] [phone] [id number]    Church Marital Status          None Single       Admission Date Admission Type Admitting Provider Attending Provider Department, Room/Bed    18 Emergency Manolo Tripathi MD Heinss, Karl F, MD [address], 3340/2S    Discharge Date Discharge Disposition Discharge Destination                       Attending Provider:  Manolo Tripathi MD    Allergies:  No Known Allergies    Isolation:  None   Infection:  None   Code Status:  CPR    Ht:  162.6 cm (64\")   Wt:  79 kg (174 lb 1.6 oz)    Admission Cmt:  None   Principal Problem:  Cellulitis of left ankle [L03.116] More...                 Active Insurance as of 2018     Primary Coverage     Payor Plan Insurance Group Employer/Plan Group    [employer] AET[employer] [id number]     Payor Plan Address Payor Plan Phone Number Effective From Effective To    [address] [phone] 2017     [facility] [id number]       Subscriber Name Subscriber Birth Date Member ID       CYNTHIA RIZO 1985 [id number]                 Emergency Contacts      (Rel.) Home Phone Work Phone Mobile Phone    Eric Oconnor (Brother) [phone] -- --               History & Physical      Manolo Tripathi MD at 2018  1:53 PM               [facility] HOSPITALIST HISTORY AND PHYSICAL    Patient Identification:  Name:  Cynthia Rizo  Age:  33 y.o.  Sex:  female  :  1985  MRN:  [id number]   Visit Number:  [id number]  Primary Care Physician:  Provider, No Known     2018 10:48 AM     I have interviewed and examined the patient, concur with the physician assistant findings and this note also constitutes my " "findings.    Assisted by:Nia BHAT    Subjective     Chief complaint:   Chief Complaint   Patient presents with   • Abscess     History of presenting illness:   Patient is a 33 y.o. female with past medical history significant for seizure disorder as a child/young adult which she reportedly outgrew. She presented to the emergency department of [facility] on 8/8/2018 with complaints of swelling, pain, and redness of her left ankle. She states that on Sunday, 08/05/18, she began having redness and swelling in the left ankle. She denies having any injury or ingrown hairs in the area. No prior history of orthopedic surgery or foreign body in the area. She has had subjective fever as well as chilling and diaphoresis. She has been taking ibuprofen at home for fever. No chest pains or shortness of breath. She has had diarrhea for the last few weeks of watery stools. No blood or black stools.  She has been under high stress with moving, job, and her mother has been in the hospital.  She has some generalized abdominal discomfort, worse in the left upper quadrant. No nausea or vomiting. She has had some central and left sided soreness in her back. No neck stiffness, phonophobia, or photophobia. No dysuria or hematuria.     She states the left ankle \"just Stings\"with attempted movement.  If she is lying still pain is 0/10.  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  She has had subjective fever symptoms associated with this.  She states she has no polyuria polydipsia or polyphagia, diet however is suspect, diabetes does run in her family    She went to a local urgent care center yesterday for the ankle and feeling poorly. A scab had formed which was removed at the urgent care center. She reports that they were unable to get any drainage from the wound. She was given an IM injection of Rocephin and an oral antibiotic was sent in to her pharmacy, which she had " planned to fill today. When she awoke this morning, the ankle had increased swelling and redness, taking her back to the urgent care center. She was then sent to the ED for further evaluation and therapy.     Upon arrival to the ED, vitals were /88, , RR 18, SpO2 97%, and temperature 100.4F. CMP shows a glucose of 275, CO2 22.3, alkaline phosphatase 110, and is otherwise unremarkable. CRP is elevated at 19.24. Lactic acid is normal at 1.2. CBC shows a WBC count of 21.04, hemoglobin 13.3, hematocrit 38.9, and platelets at 440. UA is a dirty specimen and is listed below.  UDS is negative. CXR is unremarkable. XR of the left ankle shows extensive soft tissue swelling overlying the lateral malleolus. No destructive osseous changes and no evidence of ankle effusion noted. Patient has been admitted to the telemetry unit of [facility] for further evaluation and therapy.   ---------------------------------------------------------------------------------------------------------------------   Review of Systems   Constitutional: Positive for chills, diaphoresis, fatigue and fever.   HENT: Negative for congestion, sinus pain and sinus pressure.         Denies phonophobia.    Eyes: Negative for photophobia.   Respiratory: Negative for cough, shortness of breath and wheezing.    Cardiovascular: Positive for leg swelling. Negative for chest pain and palpitations.   Gastrointestinal: Positive for abdominal pain, diarrhea and nausea. Negative for blood in stool, constipation and vomiting.   Genitourinary: Negative for difficulty urinating, dysuria, hematuria and urgency.   Musculoskeletal: Positive for back pain.   Skin: Positive for color change and wound. Negative for pallor.   Neurological: Positive for weakness (Generalized. ). Negative for dizziness and syncope.   Psychiatric/Behavioral: Negative for behavioral problems and confusion.     ---------------------------------------------------------------------------------------------------------------------   History reviewed. No pertinent past medical history.  No past surgical history on file.  History reviewed. No pertinent family history.  Social History     Social History   • Marital status: Single     Social History Main Topics   • Smoking status: Never Smoker   • Smokeless tobacco: Never Used   • Alcohol use Yes      Comment: a couple a month    • Drug use: No   • Sexual activity: Defer     Other Topics Concern   • Not on file   ---------------------------------------------------------------------------------------------------------------------   Allergies:  Patient has no known allergies.  ---------------------------------------------------------------------------------------------------------------------   Prior to Admission Medications     None      ---------------------------------------------------------------------------------------------------------------------  Objective   Hospital Scheduled Meds:    heparin (porcine) 5,000 Units Subcutaneous Q12H   vancomycin 20 mg/kg Intravenous Once       Pharmacy Consult - Pharmacy to dose    Pharmacy Consult - Pharmacy to dose    sodium chloride 125 mL/hr   ---------------------------------------------------------------------------------------------------------------------   Vital Signs:  Temp:  [99.8 °F (37.7 °C)-100.4 °F (38 °C)] 99.8 °F (37.7 °C)  Heart Rate:  [115-116] 116  Resp:  [18-20] 20  BP: (125-147)/(78-92) 139/87  Mean Arterial Pressure (Non-Invasive) for the past 24 hrs (Last 3 readings):   Noninvasive MAP (mmHg)   08/08/18 1346 103   08/08/18 1331 107   08/08/18 1319 102     SpO2 Percentage    08/08/18 1331 08/08/18 1346 08/08/18 1425   SpO2: 99% 99% 99%     SpO2:  [97 %-99 %] 99 %  on   ;        Body mass index is 29.88 kg/m².  Wt Readings from Last 3 Encounters:   08/08/18 79 kg (174 lb 1.6 oz)      ---------------------------------------------------------------------------------------------------------------------   Physical Exam:  Constitutional:  Well-developed and well-nourished.  No respiratory distress.      HENT:  Head: Normocephalic and atraumatic.  Mouth:  Moist mucous membranes.    Eyes:  Conjunctivae and EOM are normal. No scleral icterus. No erythema or drainage.  She has is missing most of her dentition  Neck:  Neck supple.  No JVD present. No carotid bruits.    Cardiovascular:  tachycardic, regular rhythm and normal heart sounds with no murmur.  Pulmonary/Chest:  No respiratory distress, no wheezes, no crackles, with normal breath sounds and good air movement.  No increased respiratory effort  Abdominal:  Soft.  Bowel sounds are normal.  No distension. Mild tenderness in the left upper quadrant. No rebound or guarding.   Musculoskeletal: Left foot and ankle are edematous extending just above the ankle. Lateral malleolus has an open wound present with greenish purulent drainage in the center but tissue looks viable.  Posterior to the lateral malleolus, there is an area of brownish/black tissue that appears necrotic with a superficial layer of the skin beginning to slough, with no open wound or drainage there. Blister noted on the dorsum of the left foot. There is erythema with faint extension up the front of the left leg, about half way to the knee.  Adequate distal pulses.  The lateral surface of the ankle is mildly warm.    Neurological:  Alert and oriented to person, place, and time.  No cranial nerve deficit.  No tongue deviation.  No facial droop.  No slurred speech.   Skin:  Lateral malleolus has an open wound present with greenish purulent drainage in the center. Posterior to the lateral malleolus, there is an area of brownish/black tissue with no open wound or drainage there. Blister noted on the dorsum of the left foot. There is erythema with faint extension up the front of the left leg,  about half way to the knee.   Psychiatric:  Normal mood and affect.  Behavior is normal.  Judgment and thought content normal.   Peripheral vascular: 2-3+ edema left foot and ankle and 2+ pedal pulses bilaterally.   Genitourinary: No robison catheter in place.   ---------------------------------------------------------------------------------------------------------------------  EKG:  Sinus tachycardia, otherwise unremarkable, reviewed    ---------------------------------------------------------------------------------------------------------------------               Results from last 7 days  Lab Units 08/08/18  1153   CRP mg/dL 19.24*   LACTATE mmol/L 1.2   WBC 10*3/mm3 21.04*   HEMOGLOBIN g/dL 13.3   HEMATOCRIT % 38.9   MCV fL 88.2   MCHC g/dL 34.2   PLATELETS 10*3/mm3 440*       Results from last 7 days  Lab Units 08/08/18  1153   SODIUM mmol/L 135   POTASSIUM mmol/L 3.9   CHLORIDE mmol/L 102   CO2 mmol/L 22.3*   BUN mg/dL 9   CREATININE mg/dL 0.64   EGFR IF NONAFRICN AM mL/min/1.73 106   CALCIUM mg/dL 9.0   GLUCOSE mg/dL 275*   ALBUMIN g/dL 4.10   BILIRUBIN mg/dL 0.6   ALK PHOS U/L 110*   AST (SGOT) U/L 16   ALT (SGPT) U/L 16   Estimated Creatinine Clearance: 124.7 mL/min (by C-G formula based on SCr of 0.64 mg/dL).    Results for MEAGAN RIZO (MRN [id number]) as of 8/8/2018 13:57   Ref. Range 8/8/2018 13:19   Color, UA Latest Ref Range: Yellow, Straw  Dark Yellow (A)   Appearance, UA Latest Ref Range: Clear  Cloudy (A)   Specific [place], UA Latest Ref Range: 1.005 - 1.030  >1.030 (H)   pH, UA Latest Ref Range: 5.0 - 8.0  5.5   Glucose, UA Latest Ref Range: Negative  >=1000 mg/dL (3+) (A)   Ketones, UA Latest Ref Range: Negative  >=160 mg/dL (4+) (A)   Blood, UA Latest Ref Range: Negative  Negative   Nitrite, UA Latest Ref Range: Negative  Negative   Leuk Esterase, UA Latest Ref Range: Negative  Small (1+) (A)   Protein, UA Latest Ref Range: Negative  Trace (A)   Bilirubin, UA Latest Ref Range: Negative   Negative   Urobilinogen, UA Latest Ref Range: 0.2 - 1.0 E.U./dL  1.0 E.U./dL   RBC, UA Latest Ref Range: None Seen, 0-2 /HPF 3-5 (A)   WBC, UA Latest Ref Range: None Seen, 0-2 /HPF 13-20 (A)   Bacteria, UA Latest Ref Range: None Seen /HPF 1+ (A)   Yeast Latest Ref Range: None Seen /HPF Small/1+ Yeast   Squamous Epithelial Cells, UA Latest Ref Range: None Seen, 0-2 /HPF 13-20 (A)   Hyaline Casts, UA Latest Ref Range: None Seen /LPF 7-12   Methodology: Unknown Manual Light Micr...   HCG, Urine QL Latest Ref Range: Negative  Negative     Pain Management Panel     Pain Management Panel Latest Ref Rng & Units 8/8/2018    AMPHETAMINES SCREEN, URINE Negative Negative    BARBITURATES SCREEN Negative Negative    BENZODIAZEPINE SCREEN, URINE Negative Negative    BUPRENORPHINE Negative Negative    COCAINE SCREEN, URINE Negative Negative    METHADONE SCREEN, URINE Negative Negative      I have personally reviewed the above laboratory results.   ---------------------------------------------------------------------------------------------------------------------  Imaging Results (last 7 days)     Procedure Component Value Units Date/Time    XR Chest 1 View [[id number]] Collected:  08/08/18 1242     Updated:  08/08/18 1250    Narrative:       EXAMINATION: XR CHEST 1 VW-      CLINICAL INDICATION:     Simple sepsis protocol     TECHNIQUE:  XR CHEST 1 VW-      COMPARISON: NONE      FINDINGS:        Lungs are aerated.   Heart size, mediastinum, and pulmonary vascularity are unremarkable.   No pneumothorax.   No effusions.   No acute osseous findings.            Impression:       No radiographic evidence of acute cardiac or pulmonary  disease.     This report was finalized on 8/8/2018 12:42 PM by Dr. Erlin Chong MD.       XR Ankle 3+ View Left [[id number]] Collected:  08/08/18 1242     Updated:  08/08/18 1250    Narrative:       EXAMINATION: XR ANKLE 3+ VW LEFT-      CLINICAL INDICATION:infection        COMPARISON: None       TECHNIQUE: 3 views left ankle     FINDINGS:   Marked degree of soft tissue swelling overlying the distal fibula. No  dystrophic calcifications identified. No periosteal reaction is noted.  No evidence of ankle effusion.           Impression:       Extensive soft tissue swelling overlying the lateral malleolus. No  destructive osseous changes identified.     This report was finalized on 8/8/2018 12:43 PM by Dr. Erlin Chong MD.           I have personally reviewed the above radiology results.   ---------------------------------------------------------------------------------------------------------------------  Assessment & Plan      -Sepsis, present on admission, as noted by tachycardia in the 110s-120s, fever 100.4, leukocytosis at 21,000, with acute cellulitis/possible abscess left lateral malleolus: Sepsis bundle added. Blood cultures and wound cultures drawn in the ED. Lactic acid is normal. /87. Continue vancomycin and zosyn with pharmacy to dose. Start on IV fluids at 125mL/hr. Consult infectious disease as well as orthopedic surgery, appreciate their input. Repeat CBC, CMP, CRP in AM.  Etiology is uncertain, possible spider bite    -Acute cellulitis with possible abscess left lateral malleolus: Therapy as noted above. Consult wound care.     -Sinus tachycardia: likely related to sepsis and dehydration. Continue IV fluids as noted above and monitor on telemetry. Check magnesium and TSH.     -Hyperglycemia: Patient with no previously diagnosed diabetes, but does have a family history and does not follow regularly with a PCP. Could be related to infection but most likely at this level she does have diabetes. Will place on low dose sliding scale insulin with finger stick blood glucose readings AC and HS. Place on hypoglycemia protocol. Check hemoglobin A1c level.  There is no increased anion gap.    -Diarrhea: stool studies ordered in the ED. Patient has not had any recent antibiotic therapy, other than  a rocephin IM injection yesterday. Her mother is in a local nursing home and she visits frequently. C. Diff PCR has been ordered.     -History of seizure disorder: No seizures reported since she was 14 years old.     -DVT Prophylaxis: Subcutaneous heparin.    The patient is considered to be a high risk patient due to: sepsis, cellulitis with possible abscess.     Code Status: FULL CODE.     I have discussed the patient's assessment and plan with the patient.     VIVIANA Mcleod  08/08/18  2:40 PM  ---------------------------------------------------------------------------------------------------------------------       Electronically signed by Manolo Tripathi MD at 8/8/2018  4:23 PM          Emergency Department Notes      Claudia Sprague RN at 8/8/2018 10:51 AM        Pt reports that she noticed some swelling to her left ankle on Sunday, went to the [facility], had the area lanced and was given an antibiotic injection.  Pt reports that she is having increased swelling, pain, tenderness, erythema and warmth to the area and [facility] and they sent her here.  Pt has open area the left ankle that is draining yellow tinged drainage.  Has redness, warmth to the area with swelling.  Has an area posterior to the open area that has a fluid filled blister with blackened area under blister region.  Pt reports that she is having increased pain.       Claudia Sprague RN  08/08/18 1052      Electronically signed by Claudia Sprague RN at 8/8/2018 10:59 AM     Do Barrera RN at 8/8/2018 11:50 AM        Pt resting quietly on stretcher with no complaints.  Pt AAOx4 with no resp distress noted, respirations even and unlabored.  Pt denies any needs at this time.  Skin PWD. Will continue to monitor and follow plan of care.  Bed rails up x2, bed in lowest position, call light in reach.         Do Barrera RN  08/08/18 4226      Electronically signed  by Do Barrera RN at 8/8/2018  1:02 PM     Do Barrera RN at 8/8/2018 12:50 PM        Pt resting quietly on stretcher with no complaints.  Pt AAOx4 with no resp distress noted, respirations even and unlabored.  Pt denies any needs at this time.  Skin PWD. Will continue to monitor and follow plan of care.  Bed rails up x2, bed in lowest position, call light in reach.           Do Barrera RN  08/08/18 1302      Electronically signed by Do Barrera, RN at 8/8/2018  1:02 PM     Do Barrera RN at 8/8/2018 12:58 PM        Dressing applied to left ankle per Panfilo Vargas. 4X4 gauze applied and wrapped in kerlix.      Do Barrera RN  08/08/18 1254      Electronically signed by Do Barrera RN at 8/8/2018 12:58 PM     Corky Vargas PA at 8/8/2018  1:15 PM     Attestation signed by Placido Holland MD at 8/8/2018  5:02 PM          For this patient encounter, I reviewed the NP or PA documentation, treatment plan, and medical decision making. Placido Holland MD 8/8/2018 5:02 PM                  Subjective   33-year-old white female who presents secondary to redness and swelling of her left lateral ankle.  She states that she developed symptoms on Sunday.  She is that she's had subjective fever.  She also developed diarrhea.  She has had some lower back pain.  She denies any abdominal pain.  She denies ever having an injury.  No similar episodes.             Review of Systems   Constitutional: Negative.  Negative for fever.   HENT: Negative.    Respiratory: Negative.    Cardiovascular: Negative.  Negative for chest pain.   Gastrointestinal: Negative.  Negative for abdominal pain.   Endocrine: Negative.    Genitourinary: Negative.  Negative for dysuria.   Skin: Negative.    Neurological: Negative.    Psychiatric/Behavioral: Negative.    All other systems reviewed and are negative.      History reviewed. No pertinent past medical history.    No Known Allergies    No  past surgical history on file.    History reviewed. No pertinent family history.    Social History     Social History   • Marital status: Single     Social History Main Topics   • Smoking status: Never Smoker   • Smokeless tobacco: Never Used   • Alcohol use Yes      Comment: a couple a month    • Drug use: No   • Sexual activity: Defer     Other Topics Concern   • Not on file           Objective   Physical Exam   Constitutional: She is oriented to person, place, and time. She appears well-developed and well-nourished. No distress.   HENT:   Head: Normocephalic and atraumatic.   Right Ear: External ear normal.   Left Ear: External ear normal.   Nose: Nose normal.   Eyes: Pupils are equal, round, and reactive to light. Conjunctivae and EOM are normal.   Neck: Normal range of motion. Neck supple. No JVD present. No tracheal deviation present.   Cardiovascular: Normal rate, regular rhythm and normal heart sounds.    No murmur heard.  Pulmonary/Chest: Effort normal and breath sounds normal. No respiratory distress. She has no wheezes.   Abdominal: Soft. Bowel sounds are normal. There is no tenderness.   Musculoskeletal: Normal range of motion. She exhibits no edema or deformity.   Patient has swelling with redness and an open area that is draining to the lateral aspect of her left ankle.  She also has an area of darkened tissue posterior to this.  There is loose blister formation around the site.  She is neurovascularly intact.   Neurological: She is alert and oriented to person, place, and time. No cranial nerve deficit.   Skin: Skin is warm and dry. No rash noted. She is not diaphoretic. No erythema. No pallor.   Psychiatric: She has a normal mood and affect. Her behavior is normal. Thought content normal.   Nursing note and vitals reviewed.      Procedures          ED Course  ED Course as of Aug 08 1440   Wed Aug 08, 2018   1258 D/w Dr Sanchez- admit to hospitalist, will consult.  [JI]   1306 D/w Dr Tripathi- medical  admit  [JI]      ED Course User Index  [JI] Corky Vargas PA                  MDM  Number of Diagnoses or Management Options  Cellulitis of left ankle: new and requires workup  Hyperglycemia: new and requires workup  Sepsis, due to unspecified organism (CMS/HCC): new and requires workup     Amount and/or Complexity of Data Reviewed  Clinical lab tests: reviewed and ordered  Tests in the radiology section of CPT®:  reviewed and ordered  Discuss the patient with other providers: yes  Independent visualization of images, tracings, or specimens: yes    Risk of Complications, Morbidity, and/or Mortality  Presenting problems: moderate          Final diagnoses:   Sepsis, due to unspecified organism (CMS/HCC)   Cellulitis of left ankle   Hyperglycemia            Corky Vargas PA  08/08/18 1440      Electronically signed by Placido Holland MD at 8/8/2018  5:02 PM     Do Barrera, RN at 8/8/2018  1:35 PM        Report gave to Nia BHAT on 3N at this time     Do Barrera RN  08/08/18 1327       Do Barrera RN  08/08/18 1336      Electronically signed by Do Barrera, RN at 8/8/2018  1:36 PM     Do Barrera RN at 8/8/2018  1:45 PM        Pt resting quietly on stretcher with no complaints.  Pt AAOx4 with no resp distress noted, respirations even and unlabored.  Pt denies any needs at this time.  Skin PWD. Will continue to monitor and follow plan of care.  Bed rails up x2, bed in lowest position, call light in reach.           Do Barrera RN  08/08/18 1352      Electronically signed by Do Barrera, RN at 8/8/2018  1:52 PM     Kena Ren at 8/8/2018  1:48 PM        Went to go take pt  Up to her room. Hospitalist is in there at this time. Will go back     Kena Ren  08/08/18 5788      Electronically signed by Kena Ren at 8/8/2018  1:48 PM          Physician Progress Notes (last 72 hours) (Notes from 8/7/2018  9:30 AM through 8/10/2018  9:30  Everton Jeronimo, RADHA at 8/9/2018  2:18 PM          Inpatient Progress Note  Cynthia Ríos  Date: 08/09/18  MRN: [id number]      Subjective: POD 1 of left ankle I&D with wound vac placement.  Pt states still has some pain but is tolerable with medications.  Pt also states having some low grade temp.  Pt denies chest pain or shortness of breath.         Objective:    Vitals:    08/09/18 0042 08/09/18 0300 08/09/18 0725 08/09/18 1041   BP: 108/63 102/56 148/79 139/79   BP Location: Left arm Left arm Left arm Left arm   Patient Position: Lying Lying Lying Lying   Pulse: 99 105 72 90   Resp: 15 16 18 18   Temp: 99.1 °F (37.3 °C) 99 °F (37.2 °C) 98.8 °F (37.1 °C) 98.6 °F (37 °C)   TempSrc: Oral Oral Oral Axillary   SpO2: 94% 95%     Weight:       Height:              Physical Exam:  Constitutional:  Well-developed and well-nourished.  No respiratory distress.      HENT:  Head: Normocephalic and atraumatic.  Mouth:  Moist mucous membranes.    Eyes:  Conjunctivae and EOM are normal.  Pupils are equal, round, and reactive to light.  No scleral icterus.  Neck:  Neck supple.  No JVD present.    Cardiovascular:  Normal rate, regular rhythm and normal heart sounds with no murmur.  Pulmonary/Chest:  No respiratory distress, no wheezes, no crackles, with normal breath sounds and good air movement.  Abdominal:  Soft.  Bowel sounds are normal.  No distension and no tenderness.   Musculoskeletal:  Edema noted to the left ankle and foot.  Active rom of the left foot digits, +pp cap refill brisk less than 2 sec.   Neurological:  Alert and oriented to person, place, and time.  No cranial nerve deficit.  No tongue deviation.  No facial droop.  No slurred speech.   Skin:  Wound vac in place to the left lateral ankle   Psychiatric:  Normal mood and affect.  Behavior is normal.  Judgment and thought content normal.   Peripheral vascular:  No edema and strong pulses on all 4 extremities.      Labs:      Results from last 7  days  Lab Units 08/09/18  0150 08/08/18  1153   CRP mg/dL 16.15* 19.24*   LACTATE mmol/L  --  1.2   WBC 10*3/mm3 18.27* 21.04*   HEMOGLOBIN g/dL 11.1* 13.3   HEMATOCRIT % 32.5* 38.9   MCV fL 88.1 88.2   MCHC g/dL 34.2 34.2   PLATELETS 10*3/mm3 422* 440*           Results from last 7 days  Lab Units 08/09/18  0150 08/08/18  1153   SODIUM mmol/L 137 135   POTASSIUM mmol/L 3.9 3.9   MAGNESIUM mg/dL  --  2.2   CHLORIDE mmol/L 104 102   CO2 mmol/L 20.1* 22.3*   BUN mg/dL 10 9   CREATININE mg/dL 0.56 0.64   EGFR IF NONAFRICN AM mL/min/1.73 123 106   CALCIUM mg/dL 8.3 9.0   GLUCOSE mg/dL 266* 275*   ALBUMIN g/dL 3.30* 4.10   BILIRUBIN mg/dL 0.4 0.6   ALK PHOS U/L 89 110*   AST (SGOT) U/L 19 16   ALT (SGPT) U/L 19 16   Estimated Creatinine Clearance: 142.6 mL/min (by C-G formula based on SCr of 0.56 mg/dL).  No results found for: AMMONIA          Hemoglobin A1C   Date Value Ref Range Status   08/08/2018 10.60 (H) 4.50 - 5.70 % Final     Lab Results   Component Value Date    TSH 1.031 08/08/2018         Pain Management Panel     Pain Management Panel Latest Ref Rng & Units 8/8/2018    AMPHETAMINES SCREEN, URINE Negative Negative    BARBITURATES SCREEN Negative Negative    BENZODIAZEPINE SCREEN, URINE Negative Negative    BUPRENORPHINE Negative Negative    COCAINE SCREEN, URINE Negative Negative    METHADONE SCREEN, URINE Negative Negative          Blood Culture   Date Value Ref Range Status   08/08/2018 No growth at 24 hours  Preliminary   08/08/2018 No growth at 24 hours  Preliminary     Urine Culture   Date Value Ref Range Status   08/08/2018 Normal Urogenital Kelle  Preliminary                       Radiology:  Imaging Results (last 72 hours)     Procedure Component Value Units Date/Time    XR Chest 1 View [[id number]] Collected:  08/08/18 1242     Updated:  08/08/18 1250    Narrative:       EXAMINATION: XR CHEST 1 VW-      CLINICAL INDICATION:     Simple sepsis protocol     TECHNIQUE:  XR CHEST 1 VW-      COMPARISON: NONE       FINDINGS:        Lungs are aerated.   Heart size, mediastinum, and pulmonary vascularity are unremarkable.   No pneumothorax.   No effusions.   No acute osseous findings.            Impression:       No radiographic evidence of acute cardiac or pulmonary  disease.     This report was finalized on 8/8/2018 12:42 PM by Dr. Erlin Chong MD.       XR Ankle 3+ View Left [[id number]] Collected:  08/08/18 1242     Updated:  08/08/18 1250    Narrative:       EXAMINATION: XR ANKLE 3+ VW LEFT-      CLINICAL INDICATION:infection        COMPARISON: None      TECHNIQUE: 3 views left ankle     FINDINGS:   Marked degree of soft tissue swelling overlying the distal fibula. No  dystrophic calcifications identified. No periosteal reaction is noted.  No evidence of ankle effusion.           Impression:       Extensive soft tissue swelling overlying the lateral malleolus. No  destructive osseous changes identified.     This report was finalized on 8/8/2018 12:43 PM by Dr. Erlin Chong MD.               Assessment: s/p left ankle I&D with wound vac          Plan: Pt is to continue post op ortho orders.  Recommend pt to be on IV antibiotics x 6 weeks.  If discharged pt is to follow up with Dr. Artis in 10 - 14 days.        RADHA Burnette August 9, 2018 2:18 PM    Electronically signed by Everton Rosado APRN at 8/9/2018  2:31 PM     Alissa Ramirez APRN at 8/9/2018  1:18 PM     Attestation signed by Guzman Cervantes MD at 8/10/2018  7:15 AM    I have reviewed the documentation above and agree.                      I have personally seen and examined the patient today and discussed overnight interval progress and pertinent issues with nursing staff.    Subjective:    No issues or complaints.  WBC improving at 18.27.  CRP improving at 16.15.  Urine culture preliminary shows no growth.  Wound culture remains in progress.  Blood cultures show no growth at 24 hours.  Chlamydia and gonorrhea antibodies in  "progress.    History taken from: patient chart RN      Vital Signs    /79 (BP Location: Left arm, Patient Position: Lying)   Pulse 90   Temp 98.6 °F (37 °C) (Axillary)   Resp 18   Ht 162.6 cm (64\")   Wt 79 kg (174 lb 1.6 oz)   SpO2 95%   BMI 29.88 kg/m²      Temp:  [97.7 °F (36.5 °C)-101.9 °F (38.8 °C)] 98.6 °F (37 °C)      Intake/Output Summary (Last 24 hours) at 08/09/18 1319  Last data filed at 08/09/18 0437   Gross per 24 hour   Intake             1470 ml   Output                0 ml   Net             1470 ml     Intake & Output (last 3 days)       08/06 0701 - 08/07 0700 08/07 0701 - 08/08 0700 08/08 0701 - 08/09 0700 08/09 0701 - 08/10 0700    P.O.   120     I.V. (mL/kg)   1350 (17.1)     Total Intake(mL/kg)   1470 (18.6)     Net     +1470              Unmeasured Urine Occurrence   2 x     Unmeasured Stool Occurrence   2 x           Physical exam:    General Appearance:    Alert, fever, chills and malaise    Head:    Normocephalic, without obvious abnormality, atraumatic   Eyes:            Lids and lashes normal, conjunctivae and sclerae normal, no   icterus, no pallor, corneas clear, PERRLA   Ears:    Ears appear intact with no abnormalities noted   Throat:   No oral lesions, no thrush, oral mucosa moist   Neck:   No adenopathy, supple, trachea midline, no thyromegaly, no   carotid bruit, no JVD   Back:     No tenderness to percussion or palpation, range of motion   normal   Lungs:     Clear to auscultation,respirations regular, even and           unlabored. No wheezing, no ronchi and no crackles.    Heart:    Regular rhythm and normal rate, normal S1 and S2, no            murmur, no gallop, no rub, no click   Chest Wall:    No abnormalities observed   Abdomen:     Normal bowel sounds, no masses, no organomegaly, soft        non-tender, non-distended, no guarding, no rebound                tenderness   Rectal:     Deferred   Extremities:   Moves all extremities well, no edema, no cyanosis, no     "         redness   Pulses:   Pulses palpable and equal bilaterally   Skin:   Left ankle outer aspect noted with open ulceration status post removal of scab.  Ankle and foot and toes with significant erythema, edema and warmth with limited range of motion.  Significant area aside from previous mention noted to have possibly been blister but is drained now with discoloration underneath.    Lymph nodes:   No palpable adenopathy   Neurologic:   Awake, alert and oriented x 3. Following commands.       Results:      Results from last 7 days  Lab Units 08/09/18  0150 08/08/18  1153   WBC 10*3/mm3 18.27* 21.04*     Lab Results   Component Value Date    NEUTROABS 14.29 (H) 08/09/2018         Results from last 7 days  Lab Units 08/09/18  0150   CREATININE mg/dL 0.56         Results from last 7 days  Lab Units 08/09/18  0150 08/08/18  1153   CRP mg/dL 16.15* 19.24*       Imaging Results (last 24 hours)     ** No results found for the last 24 hours. **            Results Review:    I have personally reviewed laboratory data, culture results, radiology studies and antimicrobial therapy.    Hospital Medications (active)       Dose Frequency Start End    acetaminophen (TYLENOL) tablet 650 mg 650 mg Every 6 Hours PRN 8/9/2018     Sig - Route: Take 2 tablets by mouth Every 6 (Six) Hours As Needed for Mild Pain  or Fever. - Oral    cefTRIAXone (ROCEPHIN) 2 g/100 mL 0.9% NS VTB (DENISHA) 2 g Every 24 Hours 8/8/2018 8/13/2018    Sig - Route: Infuse 100 mL into a venous catheter Daily. - Intravenous    dextrose (D50W) solution 25 g 25 g Every 15 Minutes PRN 8/8/2018     Sig - Route: Infuse 50 mL into a venous catheter Every 15 (Fifteen) Minutes As Needed for Low Blood Sugar (Blood Sugar Less Than 70). - Intravenous    Cosign for Ordering: Accepted by Manolo Tripathi MD on 8/8/2018  4:16 PM    dextrose (D50W) solution 25 g 25 g Every 15 Minutes PRN 8/9/2018     Sig - Route: Infuse 50 mL into a venous catheter Every 15 (Fifteen) Minutes As  Needed for Low Blood Sugar (Blood Sugar Less Than 70). - Intravenous    Cosign for Ordering: Accepted by Manolo Tripathi MD on 8/9/2018 12:19 PM    dextrose (GLUTOSE) oral gel 15 g 15 g Every 15 Minutes PRN 8/8/2018     Sig - Route: Take 15 g by mouth Every 15 (Fifteen) Minutes As Needed for Low Blood Sugar (Blood sugar less than 70). - Oral    Cosign for Ordering: Accepted by Manolo Tripathi MD on 8/8/2018  4:16 PM    dextrose (GLUTOSE) oral gel 15 g 15 g Every 15 Minutes PRN 8/9/2018     Sig - Route: Take 15 g by mouth Every 15 (Fifteen) Minutes As Needed for Low Blood Sugar (Blood sugar less than 70). - Oral    Cosign for Ordering: Accepted by Manolo Tripathi MD on 8/9/2018 12:19 PM    glucagon (human recombinant) (GLUCAGEN DIAGNOSTIC) injection 1 mg 1 mg As Needed 8/8/2018     Sig - Route: Inject 1 mg under the skin into the appropriate area as directed As Needed (Blood Glucose Less Than 70). - Subcutaneous    Cosign for Ordering: Accepted by Manolo Tripathi MD on 8/8/2018  4:16 PM    glucagon (human recombinant) (GLUCAGEN DIAGNOSTIC) injection 1 mg 1 mg As Needed 8/9/2018     Sig - Route: Inject 1 mg under the skin into the appropriate area as directed As Needed (Blood Glucose Less Than 70). - Subcutaneous    Cosign for Ordering: Accepted by Manolo Tripathi MD on 8/9/2018 12:19 PM    heparin (porcine) 5000 UNIT/ML injection 5,000 Units 5,000 Units Every 12 Hours Scheduled 8/8/2018     Sig - Route: Inject 1 mL under the skin into the appropriate area as directed Every 12 (Twelve) Hours. - Subcutaneous    Cosign for Ordering: Accepted by Manolo Tripathi MD on 8/8/2018  4:16 PM    insulin aspart (novoLOG) injection 0-9 Units 0-9 Units 4 Times Daily Before Meals & Nightly 8/9/2018     Sig - Route: Inject 0-9 Units under the skin into the appropriate area as directed 4 (Four) Times a Day Before Meals & at Bedtime. - Subcutaneous    Cosign for Ordering: Accepted by Manolo Tripathi MD on 8/9/2018 12:19 PM    insulin  detemir (LEVEMIR) injection 10 Units 10 Units Nightly 8/9/2018     Sig - Route: Inject 10 Units under the skin into the appropriate area as directed Every Night. - Subcutaneous    Cosign for Ordering: Accepted by Manolo Tripathi MD on 8/9/2018 12:19 PM    lactobacillus acidophilus (RISAQUAD) capsule 1 capsule 1 capsule Daily 8/9/2018     Sig - Route: Take 1 capsule by mouth Daily. - Oral    nitroglycerin (NITROSTAT) SL tablet 0.4 mg 0.4 mg Every 5 Minutes PRN 8/8/2018     Sig - Route: Place 1 tablet under the tongue Every 5 (Five) Minutes As Needed for Chest Pain (if systolic BP greater than 100 mm/Hg.). - Sublingual    Cosign for Ordering: Accepted by Manolo Tripathi MD on 8/8/2018  4:16 PM    sodium chloride 0.9 % flush 1-10 mL 1-10 mL As Needed 8/8/2018     Sig - Route: Infuse 1-10 mL into a venous catheter As Needed for Line Care. - Intravenous    Cosign for Ordering: Accepted by Manolo Tripathi MD on 8/8/2018  4:16 PM    sodium chloride 0.9 % flush 10 mL 10 mL As Needed 8/8/2018     Sig - Route: Infuse 10 mL into a venous catheter As Needed for Line Care. - Intravenous    Cosign for Ordering: Accepted by Placido Holland MD on 8/8/2018  1:14 PM    sodium chloride 0.9 % infusion 125 mL/hr Continuous 8/8/2018     Sig - Route: Infuse 125 mL/hr into a venous catheter Continuous. - Intravenous    Cosign for Ordering: Accepted by Manolo Tripathi MD on 8/8/2018  4:16 PM    vancomycin (VANCOCIN) 1,250 mg in sodium chloride 0.9 % 250 mL IVPB 1,250 mg Every 12 Hours 8/9/2018 8/15/2018    Sig - Route: Infuse 1,250 mg into a venous catheter Every 12 (Twelve) Hours. - Intravenous    vancomycin (VANCOCIN) 1,500 mg in sodium chloride 0.9 % 500 mL IVPB 20 mg/kg × 79.4 kg Once 8/8/2018 8/8/2018    Sig - Route: Infuse 1,500 mg into a venous catheter 1 (One) Time. - Intravenous    Cosign for Ordering: Accepted by Placido Holland MD on 8/8/2018  1:14 PM    fentaNYL citrate (PF) (SUBLIMAZE) injection (Discontinued)  As  Needed 8/8/2018 8/8/2018    Sig - Route: Infuse  into a venous catheter As Needed for Severe Pain . - Intravenous    Reason for Discontinue: Anesthesia Stop    lactated ringers infusion (Discontinued) 125 mL/hr Continuous 8/8/2018 8/9/2018    Sig - Route: Infuse 125 mL/hr into a venous catheter Continuous. - Intravenous    metFORMIN (GLUCOPHAGE) tablet 500 mg (Discontinued) 500 mg 2 Times Daily With Meals 8/9/2018 8/9/2018    Sig - Route: Take 1 tablet by mouth 2 (Two) Times a Day With Meals. - Oral    Cosign for Ordering: Accepted by Manolo Tripathi MD on 8/9/2018 12:19 PM    midazolam (VERSED) injection (Discontinued)  As Needed 8/8/2018 8/8/2018    Sig - Route: Infuse  into a venous catheter As Needed. - Intravenous    Reason for Discontinue: Anesthesia Stop    ondansetron (ZOFRAN) injection (Discontinued)  As Needed 8/8/2018 8/8/2018    Sig - Route: Infuse  into a venous catheter As Needed for Nausea or Vomiting. - Intravenous    Reason for Discontinue: Anesthesia Stop    Pharmacy Consult - Pharmacy to dose (Discontinued)  Continuous PRN 8/8/2018 8/8/2018    Sig - Route: Continuous As Needed for Consult. - Does not apply    Reason for Discontinue: Dose adjustment    Cosign for Ordering: Accepted by Manolo Tripathi MD on 8/8/2018  4:16 PM    Pharmacy Consult - Pharmacy to dose (Discontinued)  Continuous PRN 8/8/2018 8/8/2018    Sig - Route: Continuous As Needed for Consult. - Does not apply    Reason for Discontinue: Dose adjustment    Cosign for Ordering: Accepted by Manolo Tripathi MD on 8/8/2018  4:16 PM    piperacillin-tazobactam (ZOSYN) 3.375 g/100 mL 0.9% NS IVPB (mbp) (Discontinued) 3.375 g Every 8 Hours 8/8/2018 8/8/2018    Sig - Route: Infuse 100 mL into a venous catheter Every 8 (Eight) Hours. - Intravenous    propofol (DIPRIVAN) infusion 10 mg/mL 100 mL (Discontinued)  Continuous PRN 8/8/2018 8/8/2018    Sig - Route: Infuse  into a venous catheter Continuous As Needed. - Intravenous    Reason for  Discontinue: Anesthesia Stop    Propofol (DIPRIVAN) injection (Discontinued)  As Needed 8/8/2018 8/8/2018    Sig - Route: Infuse  into a venous catheter As Needed. - Intravenous    Reason for Discontinue: Anesthesia Stop    sodium chloride 0.9 % flush 1-10 mL (Discontinued) 1-10 mL As Needed 8/8/2018 8/8/2018    Sig - Route: Infuse 1-10 mL into a venous catheter As Needed for Line Care. - Intravenous    Reason for Discontinue: Patient Transfer    sodium chloride 1,000 mL with vancomycin 1,000 mg irrigation (Discontinued)  As Needed 8/8/2018 8/8/2018    Sig: As Needed.    Reason for Discontinue: Patient Discharge    sterile water irrigation solution (Discontinued)  As Needed 8/8/2018 8/8/2018    Sig: As Needed.    Reason for Discontinue: Patient Discharge            Cultures:    Blood Culture   Date Value Ref Range Status   08/08/2018 No growth at 24 hours  Preliminary   08/08/2018 No growth at 24 hours  Preliminary     Urine Culture   Date Value Ref Range Status   08/08/2018 Normal Urogenital Kelle  Preliminary           Assessment/Plan     ASSESSMENT:    1.  Septic arthritis versus severe cellulitis  2.  Lower extremity abscess    PLAN:    No issues or complaints.  WBC improving at 18.27.  CRP improving at 16.15.  Urine culture preliminary shows no growth.  Wound culture remains in progress.  Blood cultures show no growth at 24 hours.  Chlamydia and gonorrhea antibodies in progress.    This is very concerning for septic arthritis due to decreased range of motion and very elevated CRP and white count most consistent with deep seated infection.     Differential diagnosis could be gonococcal arthritis  or inflammatory reactive arthritis.      We recommend to continue high-dose Rocephin every 24 hours and vancomycin pharmacy to dose. CRP in the a.m. Will continue to follow closely. Will adjust antibiotics based on finalization of I&D culture results.        Current antimicrobials:     Rocephin 2 g IV  daily     Vancomycin pharmacy to dose      Patient's findings and recommendations were discussed with patient, nursing staff, primary care team and consulting provider    Code Status:   Code Status and Medical Interventions:   Ordered at: 18 1559     Code Status:    CPR     Medical Interventions (Level of Support Prior to Arrest):    Full     Comments:    Any long term medical decisions to be made by her brother, Eric Oconnor who she reports to be her next of kin, in the event she can't decide for herself.       Alissa James, RADHA  18  1:19 PM    Electronically signed by Guzman Cervantes MD at 8/10/2018  7:15 AM     Birgit Galloway PA-C at 2018 10:23 AM     Attestation signed by Manolo Tripathi MD at 2018 12:24 PM    I have reviewed the documentation above and agree.  Metformin was to be added but with mild increased anion gap I am going to hold this, I'm checking an acetone, venous gas, she did have some ketones in her urine but possibly starvation.  I'm also going to check a C-peptide.  Her decrease in bicarbonate may be related to her diarrhea.                  Patient Identification:  Name:  Cynthia Ríos  Age:  33 y.o.  Sex:  female  :  1985  MRN:  [id number]  Visit Number:  [id number]  Primary Care Provider:  Provider, No Known    Length of stay:  1    HPI:     33-year-old  female admitted yesterday after presentation to the ED with left lower extremity lateral cellulitis with possible abscess of left lateral malleolus. She was admitted with broad spectrum IV abx treatment and orthopedic consultation with plans for I&D.  ID and orthopedics were consulted.  Prior to yesterday, she did not know she had diabetes mellitus but reports strong history.     Consultants:   -Orthopedics with Dr. Artis  -Infectious Disease team    Procedures:  -I&D with excisional debridement of skin and soft tissue and wound vac placement on 18 by Dr. Artis with debridement of  [id number] wound    Subjective:      Ms. Ríos is laying in bed and was resting upon entry to room.  Elevated hemoglobin a1c was discussed.  Mrs. Ríos reports that she has felt warm intermittently throughout the evening.  Ms. 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  She denies further diarrhea.  She denies further vomiting. Expresses concern regarding wound vac and doing her job as an on-the-road  discussed importance of wound vac with wound healing in setting of large debridement.    Discussed with [facility]    ----------------------------------------------------------------------------------------------------------------------  Current Hospital Meds:    ceftriaxone 2 g Intravenous Q24H   heparin (porcine) 5,000 Units Subcutaneous Q12H   insulin aspart 0-9 Units Subcutaneous 4x Daily AC & at Bedtime   insulin detemir 10 Units Subcutaneous Nightly   vancomycin 1,250 mg Intravenous Q12H       sodium chloride 125 mL/hr Last Rate: 125 mL/hr (08/09/18 0437)     ----------------------------------------------------------------------------------------------------------------------  Vital Signs:  Temp:  [97.7 °F (36.5 °C)-101.9 °F (38.8 °C)] 98.6 °F (37 °C)  Heart Rate:  [] 90  Resp:  [15-20] 18  BP: (102-148)/(56-92) 139/79  1    08/08/18  1044 08/08/18  1425   Weight: 79.4 kg (175 lb) 79 kg (174 lb 1.6 oz)     Body mass index is 29.88 kg/m².    Intake/Output Summary (Last 24 hours) at 08/09/18 1052  Last data filed at 08/09/18 0437   Gross per 24 hour   Intake             1470 ml   Output                0 ml   Net             1470 ml     No intake/output data recorded.  Diet Regular; Consistent Carbohydrate  ----------------------------------------------------------------------------------------------------------------------  Physical exam:  Constitutional: Hirsut appearing. Well-developed and well-nourished.  No respiratory distress.      HENT:  Head:   Normocephalic and atraumatic.  Mouth:  Moist mucous membranes.    Eyes:  Conjunctivae and EOM are normal.  Pupils are equal, round, and reactive to light.  No scleral icterus.    Neck:  Neck supple.  No JVD present.    Cardiovascular:  Normal rate, regular rhythm and normal heart sounds with no murmur.  Pulmonary/Chest:  No respiratory distress, no wheezes, no crackles, with normal breath sounds and good air movement.  Abdominal:  Soft.  Bowel sounds are normal.  No distension and no tenderness.   Musculoskeletal:  LLE foot with mild edema.  LLE lateral ankle with wound vac in place.    Neurological:  Alert and oriented to person, place, and time.  No cranial nerve deficit.  No tongue deviation.  No facial droop.  No slurred speech.   Skin: LLE lateral wound vac in place.  ----------------------------------------------------------------------------------------------------------------------  Tele:  SR in the 80s-90s  ----------------------------------------------------------------------------------------------------------------------        Results from last 7 days  Lab Units 08/09/18  0150 08/08/18  1153   CRP mg/dL 16.15* 19.24*   LACTATE mmol/L  --  1.2   WBC 10*3/mm3 18.27* 21.04*   HEMOGLOBIN g/dL 11.1* 13.3   HEMATOCRIT % 32.5* 38.9   MCV fL 88.1 88.2   MCHC g/dL 34.2 34.2   PLATELETS 10*3/mm3 422* 440*           Results from last 7 days  Lab Units 08/09/18  0150 08/08/18  1153   SODIUM mmol/L 137 135   POTASSIUM mmol/L 3.9 3.9   MAGNESIUM mg/dL  --  2.2   CHLORIDE mmol/L 104 102   CO2 mmol/L 20.1* 22.3*   BUN mg/dL 10 9   CREATININE mg/dL 0.56 0.64   EGFR IF NONAFRICN AM mL/min/1.73 123 106   CALCIUM mg/dL 8.3 9.0   GLUCOSE mg/dL 266* 275*   ALBUMIN g/dL 3.30* 4.10   BILIRUBIN mg/dL 0.4 0.6   ALK PHOS U/L 89 110*   AST (SGOT) U/L 19 16   ALT (SGPT) U/L 19 16   Estimated Creatinine Clearance: 142.6 mL/min (by C-G formula based on SCr of 0.56 mg/dL).  No results found for: AMMONIA      Blood Culture   Date Value  Ref Range Status   08/08/2018 No growth at less than 24 hours  Preliminary   08/08/2018 No growth at less than 24 hours  Preliminary              ----------------------------------------------------------------------------------------------------------------------  Imaging Results (last 24 hours)     Procedure Component Value Units Date/Time    XR Chest 1 View [[id number]] Collected:  08/08/18 1242     Updated:  08/08/18 1250    Narrative:       EXAMINATION: XR CHEST 1 VW-      CLINICAL INDICATION:     Simple sepsis protocol     TECHNIQUE:  XR CHEST 1 VW-      COMPARISON: NONE      FINDINGS:        Lungs are aerated.   Heart size, mediastinum, and pulmonary vascularity are unremarkable.   No pneumothorax.   No effusions.   No acute osseous findings.            Impression:       No radiographic evidence of acute cardiac or pulmonary  disease.     This report was finalized on 8/8/2018 12:42 PM by Dr. Erlin Chong MD.       XR Ankle 3+ View Left [[id number]] Collected:  08/08/18 1242     Updated:  08/08/18 1250    Narrative:       EXAMINATION: XR ANKLE 3+ VW LEFT-      CLINICAL INDICATION:infection        COMPARISON: None      TECHNIQUE: 3 views left ankle     FINDINGS:   Marked degree of soft tissue swelling overlying the distal fibula. No  dystrophic calcifications identified. No periosteal reaction is noted.  No evidence of ankle effusion.           Impression:       Extensive soft tissue swelling overlying the lateral malleolus. No  destructive osseous changes identified.     This report was finalized on 8/8/2018 12:43 PM by Dr. Erlin Chong MD.             Microbiology Results (last 10 days)     Procedure Component Value - Date/Time    Wound Culture - Wound, Ankle, Left [[id number]] Collected:  08/08/18 8839    Lab Status:  Preliminary result Specimen:  Wound from Ankle, Left Updated:  08/09/18 3549     Gram Stain Result Many (4+) WBCs seen      Moderate (3+) Gram positive cocci in pairs and clusters    Blood  Culture - Blood, [[id number]]  (Normal) Collected:  08/08/18 1153    Lab Status:  Preliminary result Specimen:  Blood from Arm, Left Updated:  08/09/18 0032     Blood Culture No growth at less than 24 hours    Blood Culture - Blood, [[id number]]  (Normal) Collected:  08/08/18 1140    Lab Status:  Preliminary result Specimen:  Blood from Arm, Right Updated:  08/09/18 0032     Blood Culture No growth at less than 24 hours    Culture, Routine - Swab, Ankle, Left [[id number]]  (Abnormal) Collected:  08/08/18 1123    Lab Status:  Preliminary result Specimen:  Swab from Ankle, Left Updated:  08/09/18 0744     Culture Scant growth (1+) Gram positive cocci, consistent with Staph spp (A)     Gram Stain Result No WBCs or organisms seen            ----------------------------------------------------------------------------------------------------------------------  Assessment and Plan:    -Sepsis on admission with LLE cellulitis and LLE lateral abscess: ID following with Zosyn changed to high dose Rocephin.  IV Vancomycin continued.  Initial swab with growth of gram positive cocci thus far. Intraoperative culture is pending at present. Blood cultures are unremarkable thus far. C-RP and WBC improving.  No fevers since last evening. Blood pressures improving.  Continue IV fluids and will follow cultures. ID input appreciated.  Orthopedics input appreciated.     -Hyperglycemia in the setting of newly diagnosed diabetes mellitus type 2: FSBG ACHS added with low-dose sliding scale insulin. 10 units of Levemir has been added for this evening. Consistent carbohydrate diet ordered.  Diabetes educator consultation has been placed given newly diagnosed diabetes.  Hemoglobin a1c was found to be 10.6%.  Discuss importance of blood glucose control in wound healing.      -Very mildly elevated anion gap: Will repeat afternoon BMP and follow.      -Diarrhea, resolved: Studies pending at present but has had no further events.     -UTI vs.  Contaminant:  IV abx as previously outlined.  Urine culture pending.      -DVT prophylaxis: SQ Heparin      Medication changes:   -Low dose sliding scale insulin added.   -Levemir 10 units added nightly.     The patient is high risk due to the following diagnoses/reasons:  Sepsis with LLE cellulitis and abscess    Birgit Galloway PA-C  08/09/18  10:52 AM      Electronically signed by Manolo Tripathi MD at 8/9/2018 12:24 PM          Consult Notes (last 72 hours) (Notes from 8/7/2018  9:30 AM through 8/10/2018  9:30 AM)      Sharri Patiño APRN at 8/8/2018  3:21 PM      Consult Orders:    1. Inpatient Infectious Diseases Consult [[id number]] ordered by Birgit Cooley PA at 08/08/18 1437           Attestation signed by Guzman Cervantes MD at 8/9/2018 10:53 AM    I have reviewed the documentation above and agree.                      INFECTIOUS DISEASE CONSULTATION REPORT      Referring Provider: Dr. Tripathi  Reason for Consultation: Sepsis with possible abscess left ankle      Principal problem: <principal problem not specified>    Subjective .     History of present illness:    As you well know Dr. Tripathi, Ms. Cynthia Ríos is a 33 y.o. female with past medical history not significant, who presented to [facility] on 8/8/2018 for worsening left foot edema, erythema, fever, chills.  Patient states that she went to a local urgent care center yesterday for ankle pain, edema, erythema and feeling really bad.  Left ankle outer aspect noted with open ulceration status post removal of scab.  Ankle and foot and toes with significant erythema, edema and warmth with limited range of motion.  Significant area aside from previous mention noted to have possibly been blister but is drained now with discoloration underneath.  Patient's foot is very painful to touch.  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  She also complains of mid back pain.  She denies hematuria or dysuria.    Infectious Disease consultation was requested for antimicrobial management.     History taken from: patient chart RN    Case was discussed with patient, nursing staff, primary care team and consulting provider    Subjective         Review of Systems     Constitutional: No fever, chills and night sweats. No appetite change or unexpected weight change. No fatigue.  Eyes: No eye drainage, itching or redness.  HEENT: No mouth sores, dysphagia or nose bleed.  Respiratory: No for shortness of breath, cough or production of sputum.  Cardiovascular: No chest pain, no palpitations, no orthopnea.  Gastrointestinal: No nausea, vomiting or diarrhea. No abdominal pain, hematemesis or rectal bleeding.  Genitourinary: No dysuria or polyuria.  Hematologic/lymphatic: No lymph node abnormalities, no easy bruising or easy bleeding.  Musculoskeletal: No muscle or joint pain.  Skin: No rash and no itching.  Neurological: No loss of consciousness, no seizure, no headache.  Behavioral/Psych: No depression or suicidal ideation.  Endocrine: No hot flashes.  Immunologic: Negative.    Past Medical History    History reviewed. No pertinent past medical history.    Past Surgical History    No past surgical history on file.    Family History    History reviewed. No pertinent family history.        Social History    Social History   Substance Use Topics   • Smoking status: Never Smoker   • Smokeless tobacco: Never Used   • Alcohol use Yes      Comment: a couple a month        Allergies    Patient has no known allergies.        Objective         Objective     /87 (BP Location: Left arm, Patient Position: Lying)   Pulse 116   Temp 99.8 °F (37.7 °C) (Oral)   Resp 20   Ht 162.6 cm (64\")   Wt 79 kg (174 lb 1.6 oz)   SpO2 99%   BMI 29.88 kg/m²      Temp:  [99.8 °F (37.7 °C)-100.4 °F (38 °C)] 99.8 °F (37.7 °C)      No intake or output data in the 24 " hours ending 08/08/18 1521      Physical Exam:      General Appearance:    Alert, fever, chills and malaise    Head:    Normocephalic, without obvious abnormality, atraumatic   Eyes:            Lids and lashes normal, conjunctivae and sclerae normal, no   icterus, no pallor, corneas clear, PERRLA   Ears:    Ears appear intact with no abnormalities noted   Throat:   No oral lesions, no thrush, oral mucosa moist   Neck:   No adenopathy, supple, trachea midline, no thyromegaly, no   carotid bruit, no JVD   Back:     No tenderness to percussion or palpation, range of motion   normal   Lungs:     Clear to auscultation,respirations regular, even and           unlabored. No wheezing, no ronchi and no crackles.    Heart:    Regular rhythm and normal rate, normal S1 and S2, no            murmur, no gallop, no rub, no click   Chest Wall:    No abnormalities observed   Abdomen:     Normal bowel sounds, no masses, no organomegaly, soft        non-tender, non-distended, no guarding, no rebound                tenderness   Rectal:     Deferred   Extremities:   Moves all extremities well, no edema, no cyanosis, no             redness   Pulses:   Pulses palpable and equal bilaterally   Skin:   Left ankle outer aspect noted with open ulceration status post removal of scab.  Ankle and foot and toes with significant erythema, edema and warmth with limited range of motion.  Significant area aside from previous mention noted to have possibly been blister but is drained now with discoloration underneath.    Lymph nodes:   No palpable adenopathy   Neurologic:   Awake, alert and oriented x 3. Following commands.       Results:      Results from last 7 days  Lab Units 08/08/18  1153   WBC 10*3/mm3 21.04*     Lab Results   Component Value Date    NEUTROABS 16.68 (H) 08/08/2018         Results from last 7 days  Lab Units 08/08/18  1153   CREATININE mg/dL 0.64         Results from last 7 days  Lab Units 08/08/18  1153   CRP mg/dL 19.24*        Imaging Results (last 24 hours)     Procedure Component Value Units Date/Time    XR Chest 1 View [[id number]] Collected:  08/08/18 1242     Updated:  08/08/18 1250    Narrative:       EXAMINATION: XR CHEST 1 VW-      CLINICAL INDICATION:     Simple sepsis protocol     TECHNIQUE:  XR CHEST 1 VW-      COMPARISON: NONE      FINDINGS:        Lungs are aerated.   Heart size, mediastinum, and pulmonary vascularity are unremarkable.   No pneumothorax.   No effusions.   No acute osseous findings.            Impression:       No radiographic evidence of acute cardiac or pulmonary  disease.     This report was finalized on 8/8/2018 12:42 PM by Dr. Erlin Chong MD.       XR Ankle 3+ View Left [[id number]] Collected:  08/08/18 1242     Updated:  08/08/18 1250    Narrative:       EXAMINATION: XR ANKLE 3+ VW LEFT-      CLINICAL INDICATION:infection        COMPARISON: None      TECHNIQUE: 3 views left ankle     FINDINGS:   Marked degree of soft tissue swelling overlying the distal fibula. No  dystrophic calcifications identified. No periosteal reaction is noted.  No evidence of ankle effusion.           Impression:       Extensive soft tissue swelling overlying the lateral malleolus. No  destructive osseous changes identified.     This report was finalized on 8/8/2018 12:43 PM by Dr. Erlin Chong MD.               Cultures:         Results Review:    I have personally reviewed laboratory data, culture results, radiology studies and antimicrobial therapy.    Hospital Medications (active)       Dose Frequency Start End    dextrose (D50W) solution 25 g 25 g Every 15 Minutes PRN 8/8/2018     Sig - Route: Infuse 50 mL into a venous catheter Every 15 (Fifteen) Minutes As Needed for Low Blood Sugar (Blood Sugar Less Than 70). - Intravenous    Cosign for Ordering: Required by Manolo Tripathi MD    dextrose (GLUTOSE) oral gel 15 g 15 g Every 15 Minutes PRN 8/8/2018     Sig - Route: Take 15 g by mouth Every 15 (Fifteen) Minutes As  Needed for Low Blood Sugar (Blood sugar less than 70). - Oral    Cosign for Ordering: Required by Manolo Tripathi MD    glucagon (human recombinant) (GLUCAGEN DIAGNOSTIC) injection 1 mg 1 mg As Needed 8/8/2018     Sig - Route: Inject 1 mg under the skin into the appropriate area as directed As Needed (Blood Glucose Less Than 70). - Subcutaneous    Cosign for Ordering: Required by Manolo Tripathi MD    heparin (porcine) 5000 UNIT/ML injection 5,000 Units 5,000 Units Every 12 Hours Scheduled 8/8/2018     Sig - Route: Inject 1 mL under the skin into the appropriate area as directed Every 12 (Twelve) Hours. - Subcutaneous    Cosign for Ordering: Required by Manolo Tripathi MD    nitroglycerin (NITROSTAT) SL tablet 0.4 mg 0.4 mg Every 5 Minutes PRN 8/8/2018     Sig - Route: Place 1 tablet under the tongue Every 5 (Five) Minutes As Needed for Chest Pain (if systolic BP greater than 100 mm/Hg.). - Sublingual    Cosign for Ordering: Required by Manolo Tripathi MD    piperacillin-tazobactam (ZOSYN) 3.375 g/100 mL 0.9% NS IVPB (mbp) 3.375 g Once 8/8/2018 8/8/2018    Sig - Route: Infuse 100 mL into a venous catheter 1 (One) Time. - Intravenous    Cosign for Ordering: Accepted by Placido Holland MD on 8/8/2018  1:14 PM    piperacillin-tazobactam (ZOSYN) 3.375 g/100 mL 0.9% NS IVPB (mbp) 3.375 g Every 8 Hours 8/8/2018 8/15/2018    Sig - Route: Infuse 100 mL into a venous catheter Every 8 (Eight) Hours. - Intravenous    sodium chloride 0.9 % flush 1-10 mL 1-10 mL As Needed 8/8/2018     Sig - Route: Infuse 1-10 mL into a venous catheter As Needed for Line Care. - Intravenous    Cosign for Ordering: Required by Manolo Tripathi MD    sodium chloride 0.9 % flush 10 mL 10 mL As Needed 8/8/2018     Sig - Route: Infuse 10 mL into a venous catheter As Needed for Line Care. - Intravenous    Cosign for Ordering: Accepted by Placido Holland MD on 8/8/2018  1:14 PM    sodium chloride 0.9 % infusion 125 mL/hr Continuous 8/8/2018      Sig - Route: Infuse 125 mL/hr into a venous catheter Continuous. - Intravenous    Cosign for Ordering: Required by Manolo Tripathi MD    vancomycin (VANCOCIN) 1,250 mg in sodium chloride 0.9 % 250 mL IVPB 1,250 mg Every 12 Hours 8/9/2018 8/15/2018    Sig - Route: Infuse 1,250 mg into a venous catheter Every 12 (Twelve) Hours. - Intravenous    vancomycin (VANCOCIN) 1,500 mg in sodium chloride 0.9 % 500 mL IVPB 20 mg/kg × 79.4 kg Once 8/8/2018     Sig - Route: Infuse 1,500 mg into a venous catheter 1 (One) Time. - Intravenous    Cosign for Ordering: Accepted by Placido Holland MD on 8/8/2018  1:14 PM    Pharmacy Consult - Pharmacy to dose (Discontinued)  Continuous PRN 8/8/2018 8/8/2018    Sig - Route: Continuous As Needed for Consult. - Does not apply    Reason for Discontinue: Dose adjustment    Cosign for Ordering: Required by Manolo Tripathi MD    Pharmacy Consult - Pharmacy to dose (Discontinued)  Continuous PRN 8/8/2018 8/8/2018    Sig - Route: Continuous As Needed for Consult. - Does not apply    Reason for Discontinue: Dose adjustment    Cosign for Ordering: Required by Manolo Tripathi MD            ASSESSMENT/PLAN           Assessment/Plan     ASSESSMENT:    1.  Septic arthritis versus severe cellulitis  2.  Lower extremity abscess    PLAN:     33 y.o. female with past medical history not significant, who presented to [facility] on 8/8/2018 for worsening left foot edema, erythema, fever, chills.  Patient states that she went to a local urgent care center yesterday for ankle pain, edema, erythema and feeling really bad.  Left ankle outer aspect noted with open ulceration status post removal of scab.  Ankle and foot and toes with significant erythema, edema and warmth with limited range of motion.  Significant area aside from previous mention noted to have possibly been blister but is drained now with discoloration underneath.  Patient's foot is very painful to touch.   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  She also complains of mid back pain.  She denies hematuria or dysuria.    This is very concerning for septic arthritis due to decreased range of motion and very elevated CRP and white count most consistent with deep seated infection.    Differential diagnosis could be gonococcal arthritis  or inflammatory reactive arthritis.     Antibiotic therapy was changed to high-dose Rocephin every 24 hours and continue with vancomycin.    Zosyn was discontinued.    GC chlamydia, gonorrhea serologies.    CRP in the a.m.    Will continue to follow closely.      If patient has I&D please obtain intraoperative cultures for better identification and antibiotic treatment, thank you.      Current antimicrobials:    Rocephin 2 g IV daily    Vancomycin pharmacy to dose      Patient's findings and recommendations were discussed with patient, nursing staff, primary care team and consulting provider    Code Status:   Code Status and Medical Interventions:   Ordered at: 18 1320     Code Status:    CPR     Medical Interventions (Level of Support Prior to Arrest):    Full       RADHA Araiza  18  3:21 PM    Electronically signed by Guzman Cervantes MD at 2018 10:53 AM     Everton Rosado APRN at 2018  2:34 PM            Consults    Patient Identification:  Name:  Cynthia Ríos  Age:  33 y.o.  Sex:  female  :  1985  MRN:  [id number]  Visit Number:  [id number]  Primary care provider:  Provider, No Known    History of presenting illness: 33-year-old female was evaluated in consult today for left ankle abscess and cellulitis.  Patient explains on  she noticed that her ankle was getting swollen and Monday it was even more swollen and red and had scabbed area.  Patient explains she went to urgent care they picked a scab done a culture.  Patient explains that continue  to worsen and she came to the emergency department has been admitted patient is nothing by mouth today.  Patient denies any injury states that she is a insurance claims reviewer and work site at [employer] and may have been bit by an insect tear.  Patient states that she is moving and may have hit it or injured it at that time.  Patient also states that she has been doing a lot with mother at nursing home and may have contacted something at the nursing home.  Explains that she has been running fever have been taken over-the-counter ibuprofen.  Patient states that she got IV antibiotics in the emergency department and is currently getting vancomycin.  Patient denies any previous injuries to the left ankle states that she had 1 abscess in the past when she was 16 years old which did not need lanced.  ---------------------------------------------------------------------------------------------------------------------  Review of Systems     Constitutional - Functions of daily living impaired  HEENT - Denies headache, blurred vision, hearing loss  Cardiovascular - Denies chest pain  Resp - Denies shortness of breath  GI - Denies reflux   -Denies hematuria  Skin - Redness and abscess to left ankle  Hematology - States fever  Endocrine - Denies polyuria and polydipsia  Musculoskeletal -  Pain to left ankle  Psych - Denies depression  ---------------------------------------------------------------------------------------------------------------------   Past History:  History reviewed. No pertinent family history.  History reviewed. No pertinent past medical history.  No past surgical history on file.  Social History     Social History   • Marital status: Single     Social History Main Topics   • Smoking status: Never Smoker   • Smokeless tobacco: Never Used   • Alcohol use Yes      Comment: a couple a month    • Drug use: No   • Sexual activity: Defer     Other Topics Concern   • Not on file      ---------------------------------------------------------------------------------------------------------------------   Allergies:  Patient has no known allergies.  ---------------------------------------------------------------------------------------------------------------------   Prior to Admission Medications     None        [facility] Meds:    heparin (porcine) 5,000 Units Subcutaneous Q12H   vancomycin 20 mg/kg Intravenous Once       Pharmacy Consult - Pharmacy to dose    Pharmacy Consult - Pharmacy to dose    sodium chloride 125 mL/hr     ---------------------------------------------------------------------------------------------------------------------   Vital Signs:  Temp:  [99.8 °F (37.7 °C)-100.4 °F (38 °C)] 99.8 °F (37.7 °C)  Heart Rate:  [115-116] 116  Resp:  [18-20] 20  BP: (125-147)/(78-92) 139/87  1    08/08/18  1044 08/08/18  1425   Weight: 79.4 kg (175 lb) 79 kg (174 lb 1.6 oz)     Body mass index is 29.88 kg/m².  ---------------------------------------------------------------------------------------------------------------------   Physical exam:  Constitutional:  Well-developed and well-nourished.  No respiratory distress.      HENT:  Head: Normocephalic and atraumatic.  Mouth:  Moist mucous membranes.    Eyes:  Conjunctivae and EOM are normal.  Pupils are equal, round, and reactive to light.  No scleral icterus.  Neck:  Neck supple.  No JVD present.    Cardiovascular:  Normal rate, regular rhythm and normal heart sounds with no murmur.  Pulmonary/Chest:  No respiratory distress, no wheezes, no crackles, with normal breath sounds and good air movement.  Abdominal:  Soft.  Bowel sounds are normal.  No distension and no tenderness.   Musculoskeletal:  Left ankle and foot swollen decreased range of motion left ankle secondary to pains.    Neurological:  Alert and oriented to person, place, and time.  No cranial nerve deficit.  No tongue deviation.  No facial droop.  No slurred speech.   Skin:   Erythema noted to the left lateral ankle blackened blister noted to the left lateral ankle approximately a 2 cm diameter ulceration noted to the anterior lateral left ankle patella tinged drainage was noted from wound site.    Psychiatric:  Normal mood and affect.  Behavior is normal.  Judgment and thought content normal.   Peripheral vascular:  No edema and strong pulses on all 4 extremities.    ---------------------------------------------------------------------------------------------------------------------   .  ---------------------------------------------------------------------------------------------------------------------     Results from last 7 days  Lab Units 08/08/18  1153   CRP mg/dL 19.24*   LACTATE mmol/L 1.2   WBC 10*3/mm3 21.04*   HEMOGLOBIN g/dL 13.3   HEMATOCRIT % 38.9   MCV fL 88.2   MCHC g/dL 34.2   PLATELETS 10*3/mm3 440*           Results from last 7 days  Lab Units 08/08/18  1153   SODIUM mmol/L 135   POTASSIUM mmol/L 3.9   CHLORIDE mmol/L 102   CO2 mmol/L 22.3*   BUN mg/dL 9   CREATININE mg/dL 0.64   EGFR IF NONAFRICN AM mL/min/1.73 106   CALCIUM mg/dL 9.0   GLUCOSE mg/dL 275*   ALBUMIN g/dL 4.10   BILIRUBIN mg/dL 0.6   ALK PHOS U/L 110*   AST (SGOT) U/L 16   ALT (SGPT) U/L 16   Estimated Creatinine Clearance: 124.7 mL/min (by C-G formula based on SCr of 0.64 mg/dL).  No results found for: AMMONIA          No results found for: HGBA1C  No results found for: TSH, FREET4  Lab Results   Component Value Date    PREGTESTUR Negative 08/08/2018     Pain Management Panel     Pain Management Panel Latest Ref Rng & Units 8/8/2018    AMPHETAMINES SCREEN, URINE Negative Negative    BARBITURATES SCREEN Negative Negative    BENZODIAZEPINE SCREEN, URINE Negative Negative    BUPRENORPHINE Negative Negative    COCAINE SCREEN, URINE Negative Negative    METHADONE SCREEN, URINE Negative Negative                         ---------------------------------------------------------------------------------------------------------------------   Imaging Results (last 7 days)     Procedure Component Value Units Date/Time    XR Chest 1 View [[id number]] Collected:  08/08/18 1242     Updated:  08/08/18 1250    Narrative:       EXAMINATION: XR CHEST 1 VW-      CLINICAL INDICATION:     Simple sepsis protocol     TECHNIQUE:  XR CHEST 1 VW-      COMPARISON: NONE      FINDINGS:        Lungs are aerated.   Heart size, mediastinum, and pulmonary vascularity are unremarkable.   No pneumothorax.   No effusions.   No acute osseous findings.            Impression:       No radiographic evidence of acute cardiac or pulmonary  disease.     This report was finalized on 8/8/2018 12:42 PM by Dr. Erlin Chong MD.       XR Ankle 3+ View Left [[id number]] Collected:  08/08/18 1242     Updated:  08/08/18 1250    Narrative:       EXAMINATION: XR ANKLE 3+ VW LEFT-      CLINICAL INDICATION:infection        COMPARISON: None      TECHNIQUE: 3 views left ankle     FINDINGS:   Marked degree of soft tissue swelling overlying the distal fibula. No  dystrophic calcifications identified. No periosteal reaction is noted.  No evidence of ankle effusion.           Impression:       Extensive soft tissue swelling overlying the lateral malleolus. No  destructive osseous changes identified.     This report was finalized on 8/8/2018 12:43 PM by Dr. Erlin Chong MD.           ----------------------------------------------------------------------------------------------------------------------  Assessment and Plan: Left ankle abscess and cellulitis - Pt is to maintain NPO status at this time.  Dr. Artis will evaluate and make further treatment plan.  Pt is to cont. Current antibiotic treatments    Thank you for the consult.    RADHA Burnette  08/08/18  2:34 PM    Electronically signed by Everton Rosado APRN at 8/8/2018  2:45 PM     Vital Signs (last 2 days)    Date/Time    Temp    Pulse    Resp    BP    Device (Oxygen Therapy)    SpO2   08/10/18 0704  98.6 (37)  87  18  138/81  --  --   08/10/18 0300  98.6 (37)  90  18  133/85  --  --   08/09/18 2300  98.9 (37.2)  85  18  130/73  --  --   08/09/18 2000  --  --  --  --  room air  --   08/09/18 1917  --  --  --  --  room air  95   08/09/18 1900  98.1 (36.7)  99  19  110/73  --  --   08/09/18 1517  98.3 (36.8)  89  18  137/86  --  --   08/09/18 1041  98.6 (37)  90  18  139/79  --  --   08/09/18 0850  --  --  --  --  room air  --   08/09/18 0725  98.8 (37.1)  72  18  148/79  --  --   08/09/18 0300  99 (37.2)  105  16  102/56  --  95   08/09/18 0042  99.1 (37.3)  99  15  108/63  room air  94   08/08/18 2250  99.2 (37.3)  102  16  128/76  room air  95   08/08/18 2150  98 (36.7)  105  15  122/69  nasal cannula  97   08/08/18 2120  98.1 (36.7)  104  15  128/75  nasal cannula  97   08/08/18 2050  98.3 (36.8)  109  16  131/76  nasal cannula  97   08/08/18 2035  98.1 (36.7)  109  16  136/81  nasal cannula  96   08/08/18 2020  98.4 (36.9)  115  16  134/83  nasal cannula  97   08/08/18 2005  98.4 (36.9)  112  18  132/81  room air  92   08/08/18 1952  --  105  16  130/80  nasal cannula  97   08/08/18 1947  97.7 (36.5)  102  16  129/79  nasal cannula  97   08/08/18 1942  --  107  16  124/75  nasal cannula  97   08/08/18 1937  --  107  16  122/73  nasal cannula  96   08/08/18 1932  --  105  16  115/70  nasal cannula  97   08/08/18 1927  --  106  16  112/67  nasal cannula  96   08/08/18 1922  --  108  16  105/58  nasal cannula  96   08/08/18 1917  98.7 (37.1)  107  16  102/60  nasal cannula  99   08/08/18 1902  --  --  --  --  nasal cannula  99   08/08/18 1743   101.9 (38.8)  120  20  135/81  nasal cannula  95   08/08/18 1514  --  --  --  --  room air  96   08/08/18 1445  --  --  --  --  room air  --   08/08/18 1425  99.8 (37.7)  116  20  139/87  --  99   08/08/18 1346  --  --  --  143/87  --  99   08/08/18 1331  --  --  --  147/89  --   99   08/08/18 1319  --  --  --  134/92  --  99   08/08/18 1301  --  --  --  143/87  --  99   08/08/18 1246  --  --  --  125/81  --  99   08/08/18 1231  --  --  --  133/85  --  97   08/08/18 1218  --  --  --  128/78  --  99   08/08/18 1044  100.4 (38)  115  18  132/88  --  97     All medication doses during the admission are shown, including meds that are no longer on order.   Scheduled Meds Sorted by Name   for Cynthia Ríos as of 08/10/18 0932     1 Day 3 Days 7 Days 10 Days < Today >    Legend:                           Inactive     Active     Other Encounter    Linked               Medications 08/01 08/02 08/03 08/04 08/05 08/06 08/07 08/08 08/09 08/10   cefTRIAXone (ROCEPHIN) 2 g/100 mL 0.9% NS VTB (DENISHA)  Dose: 2 g  Freq: Every 24 Hours Route: IV  Indications of Use: SKIN AND SOFT TISSUE INFECTION  Start: 08/08/18 1700 End: 08/13/18 1659    Admin Instructions:   Caution: Look alike/sound alike drug alert. Activate vial before using.           [id number]       2017        1646        1705          famotidine (PEPCID) tablet 20 mg  Dose: 20 mg  Freq: Daily Route: PO  Start: 08/10/18 1000             1000          heparin (porcine) 5000 UNIT/ML injection 5,000 Units  Dose: 5,000 Units  Freq: Every 12 Hours Scheduled Route: SC  Indications Comment: Prophylaxis of Venous Thromboembolism  Start: 08/08/18 2100           1742       2017       ([id number]) [id number]       2019        5580       2100          insulin aspart (novoLOG) injection 0-9 Units  Dose: 0-9 Units  Freq: 4 Times Daily Before Meals & Nightly Route: SC  Start: 08/09/18 1130    Admin Instructions:   Correction - Moderate Dose.  40-60 units/day total insulin dose or average weight, on oral agents    Blood glucose 150-199 mg/dL - 2 units  Blood glucose 200-249 mg/dL - 4 units  Blood glucose 250-299 mg/dL - 6 units  Blood glucose 300-349 mg/dL - 7 units  Blood glucose 350-400 mg/dL - 8 units  Blood glucose greater than 400 mg/dL - 9 units and  call provider              1218       1641       1923       2019 0842       1130       1730       2100          insulin aspart (novoLOG) injection 5 Units  Dose: 5 Units  Freq: 3 Times Daily With Meals Route: SC  Start: 08/10/18 1200    Admin Instructions:                1200       1800          insulin detemir (LEVEMIR) injection 10 Units  Dose: 10 Units  Freq: Nightly Route: SC  Start: 08/09/18 2100 End: 08/10/18 0806    Admin Instructions:               1925 2020        0806-D/C'd      insulin detemir (LEVEMIR) injection 15 Units  Dose: 15 Units  Freq: Nightly Route: SC  Start: 08/10/18 2100    Admin Instructions:                2100          lactobacillus acidophilus (RISAQUAD) capsule 1 capsule  Dose: 1 capsule  Freq: Daily Route: PO  Start: 08/09/18 1230            1223        0843          metFORMIN (GLUCOPHAGE) tablet 500 mg  Dose: 500 mg  Freq: 2 Times Daily With Meals Route: PO  Start: 08/09/18 1800 End: 08/09/18 1222    Admin Instructions:   Do not give at time of or within 48 hours of iodinated intravenous contrast. Confirm renal function is normal before continuing.            1222-D/C'd       piperacillin-tazobactam (ZOSYN) 3.375 g/100 mL 0.9% NS IVPB (mbp)  Dose: 3.375 g  Freq: Every 8 Hours Route: IV  Indications of Use: SEPSIS,SKIN AND SOFT TISSUE INFECTION  Start: 08/08/18 1800 End: 08/08/18 1534    Order specific questions:   Request for Conventional Infusion? No, continue with extended infusion             1534-D/C'd        piperacillin-tazobactam (ZOSYN) 3.375 g/100 mL 0.9% NS IVPB (mbp)  Dose: 3.375 g  Freq: Once Route: IV  Indications of Use: SEPSIS  Last Dose: Stopped (08/08/18 1245)  Start: 08/08/18 1125 End: 08/08/18 1245           1213       1245            potassium chloride (K-DUR,KLOR-CON) CR tablet 40 mEq  Dose: 40 mEq  Freq: Every 4 Hours Route: PO  Start: 08/10/18 0900 End: 08/10/18 1659             0849       1300       1659-D/C'd      vancomycin (VANCOCIN) 1,250 mg in  sodium chloride 0.9 % 250 mL IVPB  Dose: 1,250 mg  Freq: Every 12 Hours Route: IV  Indications of Use: SEPSIS,SKIN AND SOFT TISSUE INFECTION  Start: 08/09/18 0000 End: 08/15/18 1159           1742       2017        0003       1223       2333        1200          vancomycin (VANCOCIN) 1,500 mg in sodium chloride 0.9 % 500 mL IVPB  Dose: 20 mg/kg  Weight Dosing Info: 79.4 kg  Freq: Once Route: IV  Indications of Use: SEPSIS  Last Dose: 1,500 mg (08/08/18 1249)  Start: 08/08/18 1130 End: 08/08/18 1529           1249            Medications 08/01 08/02 08/03 08/04 08/05 08/06 08/07 08/08 08/09 08/10       Continuous Meds Sorted by Name   for Cynthia Ríos as of 08/10/18 0932    Legend:                           Inactive     Active     Other Encounter    Linked               Medications 08/01 08/02 08/03 08/04 08/05 08/06 08/07 08/08 08/09 08/10   lactated ringers infusion  Rate: 125 mL/hr Dose: 125 mL/hr  Freq: Continuous Route: IV  Start: 08/08/18 1812 End: 08/09/18 1051           1832       1915        1051-D/C'd       Pharmacy Consult - Pharmacy to dose  Freq: Continuous PRN Route: XX  PRN Reason: Consult  Start: 08/08/18 1407 End: 08/08/18 1443    Order specific questions:   Pharmacy to Dose: vancomycin  Indication of Use Sepsis/cellulitis left ankle.             1443-D/C'd        Pharmacy Consult - Pharmacy to dose  Freq: Continuous PRN Route: XX  PRN Reason: Consult  Start: 08/08/18 1407 End: 08/08/18 1443    Order specific questions:   Pharmacy to Dose: [email or web address]  Indication of Use Sepsis/Cellulitis left ankle.             1443-D/C'd        propofol (DIPRIVAN) infusion 10 mg/mL 100 mL  Freq: Continuous PRN Route: IV  Last Dose: 100 mcg/kg/min (08/08/18 1836)  Start: 08/08/18 1836 End: 08/08/18 1916 1836       1916-D/C'd        sodium chloride 0.9 % infusion  Rate: 125 mL/hr Dose: 125 mL/hr  Freq: Continuous Route: IV  Last Dose: 125 mL/hr (08/09/18 1923)  Start: 08/08/18 1500           1448        0437        1518       1923           Medications 08/01 08/02 08/03 08/04 08/05 08/06 08/07 08/08 08/09 08/10       PRN Meds Sorted by Name   for Cynthia Ríos as of 08/10/18 0932    Legend:                           Inactive     Active     Other Encounter    Linked               Medications 08/01 08/02 08/03 08/04 08/05 08/06 08/07 08/08 08/09 08/10   acetaminophen (TYLENOL) tablet 650 mg  Dose: 650 mg  Freq: Every 6 Hours PRN Route: PO  PRN Reasons: Mild Pain ,Fever  Start: 08/09/18 0445 End: 08/10/18 0832    Admin Instructions:   Do not exceed 4 grams of acetaminophen in a 24 hr period.    If given for pain, use the following pain scale:   Mild Pain = Pain Score of 1-3, CPOT 1-2  Moderate Pain = Pain Score of 4-6, CPOT 3-4  Severe Pain = Pain Score of 7-10, CPOT 5-8            0555        0832-D/C'd      dextrose (D50W) solution 25 g  Dose: 25 g  Freq: Every 15 Minutes PRN Route: IV  PRN Reason: Low Blood Sugar  PRN Comment: Blood Sugar Less Than 70  Start: 08/09/18 0751    Admin Instructions:   Blood sugar less than 70; patient has IV access - Unresponsive, NPO or Unable To Safely Swallow                dextrose (D50W) solution 25 g  Dose: 25 g  Freq: Every 15 Minutes PRN Route: IV  PRN Reason: Low Blood Sugar  PRN Comment: Blood Sugar Less Than 70  Start: 08/08/18 1500    Admin Instructions:   Blood sugar less than 70; patient has IV access - Unresponsive, NPO or Unable To Safely Swallow           1742 2017            dextrose (GLUTOSE) oral gel 15 g  Dose: 15 g  Freq: Every 15 Minutes PRN Route: PO  PRN Reason: Low Blood Sugar  PRN Comment: Blood sugar less than 70  Start: 08/09/18 0751    Admin Instructions:   BS<70, Patient Alert, Is not NPO, Can safely swallow.                dextrose (GLUTOSE) oral gel 15 g  Dose: 15 g  Freq: Every 15 Minutes PRN Route: PO  PRN Reason: Low Blood Sugar  PRN Comment: Blood sugar less than 70  Start: 08/08/18 1500    Admin Instructions:   BS<70, Patient Alert, Is not NPO, Can  safely swallow.           1742 2017            fentaNYL citrate (PF) (SUBLIMAZE) injection  Freq: As Needed Route: IV  PRN Reason: Severe Pain   Start: 08/08/18 1832 End: 08/08/18 1916 1832 1916-D/C'd        glucagon (human recombinant) (GLUCAGEN DIAGNOSTIC) injection 1 mg  Dose: 1 mg  Freq: As Needed Route: SC  PRN Comment: Blood Glucose Less Than 70  Start: 08/09/18 0751    Admin Instructions:   Blood Glucose Less Than 70 - Patient Without IV Access - Unresponsive, NPO or Unable To Safely Swallow                glucagon (human recombinant) (GLUCAGEN DIAGNOSTIC) injection 1 mg  Dose: 1 mg  Freq: As Needed Route: SC  PRN Comment: Blood Glucose Less Than 70  Start: 08/08/18 1500    Admin Instructions:   Blood Glucose Less Than 70 - Patient Without IV Access - Unresponsive, NPO or Unable To Safely Swallow           1742 2017            ibuprofen (ADVIL,MOTRIN) tablet 200 mg  Dose: 200 mg  Freq: Every 4 Hours PRN Route: PO  PRN Reason: Mild Pain   Start: 08/10/18 0832    Admin Instructions:   If given for pain, use the following pain scale:  Mild Pain = Pain Score of 1-3, CPOT 1-2  Moderate Pain = Pain Score of 4-6, CPOT 3-4  Severe Pain = Pain Score of 7-10, CPOT 5-8                Magnesium Sulfate 2 gram Bolus, followed by 8 gram infusion (total Mg dose 10 grams)- Mg less than or equal to 1mg/dL  Dose: 2 g  Freq: As Needed Route: IV  PRN Comment: See Administration Instructions  Start: 08/10/18 0749    Admin Instructions:   Mg less than or equal to 1mg/dL. Give 2 gm over 30 minutes as bolus, then infuse 2 gm over 2 hours for 4 doses (8 grams) for total dose of 10 grams.  Recheck Mg levels in the AM.                Or  Magnesium Sulfate 2 gram / 50mL Infusion (GIVE X 3 BAGS TO EQUAL 6GM TOTAL DOSE) - Mg 1.1 - 1.5 mg/dl  Dose: 2 g  Freq: As Needed Route: IV  PRN Comment: See Administration Instructions  Start: 08/10/18 0749    Admin Instructions:   Mg 1.1 -1.5 mg/dL. Infuse 2 grams over 2  hours for 3 doses (for a total Mg dose of 6 grams).  Recheck Mg level in the AM.                Or  Magnesium Sulfate 4 gram infusion- Mg 1.6-1.9 mg/dL  Dose: 4 g  Freq: As Needed Route: IV  PRN Comment: See Administration Instructions  Start: 08/10/18 0749    Admin Instructions:   Mg 1.6-1.9 mg/dL. Recheck Mg level in the AM.                midazolam (VERSED) injection  Freq: As Needed Route: IV  Start: 08/08/18 1832 End: 08/08/18 1916 1832 1916-D/C'd        nitroglycerin (NITROSTAT) SL tablet 0.4 mg  Dose: 0.4 mg  Freq: Every 5 Minutes PRN Route: SL  PRN Reason: Chest Pain  PRN Comment: if systolic BP greater than 100 mm/Hg.  Start: 08/08/18 1407    Admin Instructions:   If pain unrelieved after 3 doses, notify MD.           1742 2017            ondansetron (ZOFRAN) injection  Freq: As Needed Route: IV  PRN Reasons: Nausea,Vomiting  Start: 08/08/18 1832 End: 08/08/18 1916 1832 1916-D/C'd        Pharmacy Consult - Pharmacy to dose  Freq: Continuous PRN Route: XX  PRN Reason: Consult  Start: 08/08/18 1407 End: 08/08/18 1443    Order specific questions:   Pharmacy to Dose: [email or web address]  Indication of Use Sepsis/cellulitis left ankle.             1443-D/C'd        Pharmacy Consult - Pharmacy to dose  Freq: Continuous PRN Route: XX  PRN Reason: Consult  Start: 08/08/18 1407 End: 08/08/18 1443    Order specific questions:   Pharmacy to Dose: [email or web address]  Indication of Use Sepsis/Cellulitis left ankle.             1443-D/C'd        potassium chloride (MICRO-K) CR capsule 40 mEq  Dose: 40 mEq  Freq: As Needed Route: PO  PRN Comment: potassium replacement.  see admin instructions  Start: 08/10/18 0749    Admin Instructions:   Potassium replacement    Oral (may give capsule or powder packet)  If K+ less than or equal to 3.1 give KCl 40 mEq q4h x 3 doses  If K+ 3.2-3.6 give KCl 40 mEq q4h x 2 doses    Peripheral IV  If K+ less than or equal to 3.1 give KCl 10 mEq/100 mL NS IV q1h x 6 doses  If  K+ 3.2-3.6 give KCl 10 mEq/100 mL NS q1h x 4 doses    Central Line  If K+ less than or equal to 3.1 give KCl 20 mEq/50 mL NS IV q1h x 3 doses  If K+ 3.2-3.6 give KCl 20 mEq/50 mL NS q1h x 2 doses    Check potassium 4 hours after last dose given.  Check magnesium if K stays low after replacement.  DO NOT GIVE if CrCl is less than 30 mL/minute or urine output is less than 30 mL/hr                Or  potassium chloride (KLOR-CON) packet 40 mEq  Dose: 40 mEq  Freq: As Needed Route: PO  PRN Comment: potassium replacement, see admin instructions  Start: 08/10/18 0749    Admin Instructions:   Potassium replacement    Oral (may give capsule or powder packet)  If K+ less than or equal to 3.1 give KCl 40 mEq q4h x 3 doses  If K+ 3.2-3.6 give KCl 40 mEq q4h x 2 doses    Peripheral IV  If K+ less than or equal to 3.1 give KCl 10 mEq/100 mL NS IV q1h x 6 doses  If K+ 3.2-3.6 give KCl 10 mEq/100 mL NS q1h x 4 doses    Central Line  If K+ less than or equal to 3.1 give KCl 20 mEq/50 mL NS IV q1h x 3 doses  If K+ 3.2-3.6 give KCl 20 mEq/50 mL NS q1h x 2 doses    Check potassium 4 hours after last dose given.  Check magnesium if K stays low after replacement.  DO NOT GIVE if CrCl is less than 30 mL/minute or urine output is less than 30 mL/hr                Or  potassium chloride 10 mEq in 100 mL IVPB  Dose: 10 mEq  Freq: Every 1 Hour PRN Route: IV  PRN Comment: potassium protocol PERIPHERAL - see admin instructions  Start: 08/10/18 0749    Admin Instructions:   Potassium replacement - patient NPO or cannot tolerate oral potassium    Peripheral or Central IV  If K+ less than or equal to 3.1 give KCl 10 mEq/100 mL NS IV q1h x 6 doses  If K+ 3.2-3.6 give KCl 10 mEq/100 mL NS q1h x 4 doses    Check potassium 4 hours after last dose given.  Check magnesium if K stays low after replacement.  DO NOT GIVE if CrCl is less than 30 mL/minute or urine output is less than 30 mL/hr. Rates greater than 10mEq/hr require ECG monitoring.                 propofol (DIPRIVAN) infusion 10 mg/mL 100 mL  Freq: Continuous PRN Route: IV  Start: 08/08/18 1836 End: 08/08/18 1916 1836       1916-D/C'd        Propofol (DIPRIVAN) injection  Freq: As Needed Route: IV  Start: 08/08/18 1836 End: 08/08/18 1916 1836       1916-D/C'd        sodium chloride 0.9 % flush 1-10 mL  Dose: 1-10 mL  Freq: As Needed Route: IV  PRN Reason: Line Care  Start: 08/08/18 1810 End: 08/08/18 1908           1908-D/C'd        sodium chloride 0.9 % flush 1-10 mL  Dose: 1-10 mL  Freq: As Needed Route: IV  PRN Reason: Line Care  Start: 08/08/18 1407           1742       2017            sodium chloride 0.9 % flush 10 mL  Dose: 10 mL  Freq: As Needed Route: IV  PRN Reason: Line Care  Start: 08/08/18 1121           1742       2017            sodium chloride 1,000 mL with vancomycin 1,000 mg irrigation  Freq: As Needed  Start: 08/08/18 1855 End: 08/08/18 1916 1855       1916-D/C'd        sterile water irrigation solution  Freq: As Needed  Start: 08/08/18 1855 End: 08/08/18 1916           1855       1916-D/C'd        Medications 08/01 08/02 08/03 08/04 08/05 08/06 08/07 08/08 08/09 08/10

## 2018-08-10 NOTE — PROGRESS NOTES
"  I have personally seen and examined the patient today and discussed overnight interval progress and pertinent issues with nursing staff.    Subjective:    Patient resting in bed this morning, no issues or complaints.  WBC normal.  CRP improving at 12.45.  Left ankle culture from 8/8/18 finalized as MSSA.  Repeat left ankle culture preliminary shows growth of gram-positive cocci consistent with staph species. Urine culture from 8/8/18 finalized as 50-60,000 colonies of normal urogenital kandace.    History taken from: patient chart RN      Vital Signs    /78 (BP Location: Left arm, Patient Position: Lying)   Pulse 87   Temp 98.6 °F (37 °C) (Oral)   Resp 18   Ht 162.6 cm (64\")   Wt 79 kg (174 lb 1.6 oz)   SpO2 95%   BMI 29.88 kg/m²     Temp:  [98.1 °F (36.7 °C)-98.9 °F (37.2 °C)] 98.6 °F (37 °C)      Intake/Output Summary (Last 24 hours) at 08/10/18 1212  Last data filed at 08/10/18 0919   Gross per 24 hour   Intake             1320 ml   Output                0 ml   Net             1320 ml     Intake & Output (last 3 days)       08/07 0701 - 08/08 0700 08/08 0701 - 08/09 0700 08/09 0701 - 08/10 0700 08/10 0701 - 08/11 0700    P.O.  120 1320 480    I.V. (mL/kg)  1350 (17.1)      Total Intake(mL/kg)  1470 (18.6) 1320 (16.7) 480 (6.1)    Net   +1470 +1320 +480            Unmeasured Urine Occurrence  2 x 2 x     Unmeasured Stool Occurrence  2 x 1 x           Physical exam:    General Appearance:    Alert, fever, chills and malaise    Head:    Normocephalic, without obvious abnormality, atraumatic   Eyes:            Lids and lashes normal, conjunctivae and sclerae normal, no   icterus, no pallor, corneas clear, PERRLA   Ears:    Ears appear intact with no abnormalities noted   Throat:   No oral lesions, no thrush, oral mucosa moist   Neck:   No adenopathy, supple, trachea midline, no thyromegaly, no   carotid bruit, no JVD   Back:     No tenderness to percussion or palpation, range of motion   normal   Lungs:  "    Clear to auscultation,respirations regular, even and           unlabored. No wheezing, no ronchi and no crackles.    Heart:    Regular rhythm and normal rate, normal S1 and S2, no            murmur, no gallop, no rub, no click   Chest Wall:    No abnormalities observed   Abdomen:     Normal bowel sounds, no masses, no organomegaly, soft        non-tender, non-distended, no guarding, no rebound                tenderness   Rectal:     Deferred   Extremities:   Moves all extremities well, no edema, no cyanosis, no             redness   Pulses:   Pulses palpable and equal bilaterally   Skin:   Left ankle outer aspect noted with open ulceration status post removal of scab.  Ankle and foot and toes with significant erythema, edema and warmth with limited range of motion.  Significant area aside from previous mention noted to have possibly been blister but is drained now with discoloration underneath.    Lymph nodes:   No palpable adenopathy   Neurologic:   Awake, alert and oriented x 3. Following commands.       Results:      Results from last 7 days  Lab Units 08/10/18  0218 08/09/18  0150 08/08/18  1153   WBC 10*3/mm3 11.32 18.27* 21.04*     Lab Results   Component Value Date    NEUTROABS 7.01 (H) 08/10/2018    NEUTROABS 7.70 (H) 08/10/2018         Results from last 7 days  Lab Units 08/10/18  0218   CREATININE mg/dL 0.45         Results from last 7 days  Lab Units 08/10/18  0218 08/09/18  0150 08/08/18  1153   CRP mg/dL 12.45* 16.15* 19.24*       Imaging Results (last 24 hours)     ** No results found for the last 24 hours. **            Results Review:    I have personally reviewed laboratory data, culture results, radiology studies and antimicrobial therapy.    Hospital Medications (active)       Dose Frequency Start End    acetaminophen (TYLENOL) tablet 650 mg 650 mg Every 6 Hours PRN 8/9/2018     Sig - Route: Take 2 tablets by mouth Every 6 (Six) Hours As Needed for Mild Pain  or Fever. - Oral    cefTRIAXone  (ROCEPHIN) 2 g/100 mL 0.9% NS VTB (DENISHA) 2 g Every 24 Hours 8/8/2018 8/13/2018    Sig - Route: Infuse 100 mL into a venous catheter Daily. - Intravenous    dextrose (D50W) solution 25 g 25 g Every 15 Minutes PRN 8/8/2018     Sig - Route: Infuse 50 mL into a venous catheter Every 15 (Fifteen) Minutes As Needed for Low Blood Sugar (Blood Sugar Less Than 70). - Intravenous    Cosign for Ordering: Accepted by Manolo Tripathi MD on 8/8/2018  4:16 PM    dextrose (D50W) solution 25 g 25 g Every 15 Minutes PRN 8/9/2018     Sig - Route: Infuse 50 mL into a venous catheter Every 15 (Fifteen) Minutes As Needed for Low Blood Sugar (Blood Sugar Less Than 70). - Intravenous    Cosign for Ordering: Accepted by Manolo Tripathi MD on 8/9/2018 12:19 PM    dextrose (GLUTOSE) oral gel 15 g 15 g Every 15 Minutes PRN 8/8/2018     Sig - Route: Take 15 g by mouth Every 15 (Fifteen) Minutes As Needed for Low Blood Sugar (Blood sugar less than 70). - Oral    Cosign for Ordering: Accepted by Manolo Tripathi MD on 8/8/2018  4:16 PM    dextrose (GLUTOSE) oral gel 15 g 15 g Every 15 Minutes PRN 8/9/2018     Sig - Route: Take 15 g by mouth Every 15 (Fifteen) Minutes As Needed for Low Blood Sugar (Blood sugar less than 70). - Oral    Cosign for Ordering: Accepted by Manolo Tripathi MD on 8/9/2018 12:19 PM    glucagon (human recombinant) (GLUCAGEN DIAGNOSTIC) injection 1 mg 1 mg As Needed 8/8/2018     Sig - Route: Inject 1 mg under the skin into the appropriate area as directed As Needed (Blood Glucose Less Than 70). - Subcutaneous    Cosign for Ordering: Accepted by Manolo Tripathi MD on 8/8/2018  4:16 PM    glucagon (human recombinant) (GLUCAGEN DIAGNOSTIC) injection 1 mg 1 mg As Needed 8/9/2018     Sig - Route: Inject 1 mg under the skin into the appropriate area as directed As Needed (Blood Glucose Less Than 70). - Subcutaneous    Cosign for Ordering: Accepted by Manolo Tripathi MD on 8/9/2018 12:19 PM    heparin (porcine) 5000 UNIT/ML injection  5,000 Units 5,000 Units Every 12 Hours Scheduled 8/8/2018     Sig - Route: Inject 1 mL under the skin into the appropriate area as directed Every 12 (Twelve) Hours. - Subcutaneous    Cosign for Ordering: Accepted by Manolo Tripathi MD on 8/8/2018  4:16 PM    insulin aspart (novoLOG) injection 0-9 Units 0-9 Units 4 Times Daily Before Meals & Nightly 8/9/2018     Sig - Route: Inject 0-9 Units under the skin into the appropriate area as directed 4 (Four) Times a Day Before Meals & at Bedtime. - Subcutaneous    Cosign for Ordering: Accepted by Manolo Tripathi MD on 8/9/2018 12:19 PM    insulin detemir (LEVEMIR) injection 10 Units 10 Units Nightly 8/9/2018     Sig - Route: Inject 10 Units under the skin into the appropriate area as directed Every Night. - Subcutaneous    Cosign for Ordering: Accepted by Manolo Tripathi MD on 8/9/2018 12:19 PM    lactobacillus acidophilus (RISAQUAD) capsule 1 capsule 1 capsule Daily 8/9/2018     Sig - Route: Take 1 capsule by mouth Daily. - Oral    nitroglycerin (NITROSTAT) SL tablet 0.4 mg 0.4 mg Every 5 Minutes PRN 8/8/2018     Sig - Route: Place 1 tablet under the tongue Every 5 (Five) Minutes As Needed for Chest Pain (if systolic BP greater than 100 mm/Hg.). - Sublingual    Cosign for Ordering: Accepted by Manolo Tripathi MD on 8/8/2018  4:16 PM    sodium chloride 0.9 % flush 1-10 mL 1-10 mL As Needed 8/8/2018     Sig - Route: Infuse 1-10 mL into a venous catheter As Needed for Line Care. - Intravenous    Cosign for Ordering: Accepted by Manolo Tripathi MD on 8/8/2018  4:16 PM    sodium chloride 0.9 % flush 10 mL 10 mL As Needed 8/8/2018     Sig - Route: Infuse 10 mL into a venous catheter As Needed for Line Care. - Intravenous    Cosign for Ordering: Accepted by Placido Holland MD on 8/8/2018  1:14 PM    sodium chloride 0.9 % infusion 125 mL/hr Continuous 8/8/2018     Sig - Route: Infuse 125 mL/hr into a venous catheter Continuous. - Intravenous    Cosign for Ordering: Accepted  by Manolo Tripathi MD on 8/8/2018  4:16 PM    vancomycin (VANCOCIN) 1,250 mg in sodium chloride 0.9 % 250 mL IVPB 1,250 mg Every 12 Hours 8/9/2018 8/15/2018    Sig - Route: Infuse 1,250 mg into a venous catheter Every 12 (Twelve) Hours. - Intravenous    vancomycin (VANCOCIN) 1,500 mg in sodium chloride 0.9 % 500 mL IVPB 20 mg/kg × 79.4 kg Once 8/8/2018 8/8/2018    Sig - Route: Infuse 1,500 mg into a venous catheter 1 (One) Time. - Intravenous    Cosign for Ordering: Accepted by Placido Holland MD on 8/8/2018  1:14 PM    fentaNYL citrate (PF) (SUBLIMAZE) injection (Discontinued)  As Needed 8/8/2018 8/8/2018    Sig - Route: Infuse  into a venous catheter As Needed for Severe Pain . - Intravenous    Reason for Discontinue: Anesthesia Stop    lactated ringers infusion (Discontinued) 125 mL/hr Continuous 8/8/2018 8/9/2018    Sig - Route: Infuse 125 mL/hr into a venous catheter Continuous. - Intravenous    metFORMIN (GLUCOPHAGE) tablet 500 mg (Discontinued) 500 mg 2 Times Daily With Meals 8/9/2018 8/9/2018    Sig - Route: Take 1 tablet by mouth 2 (Two) Times a Day With Meals. - Oral    Cosign for Ordering: Accepted by Manolo Tripathi MD on 8/9/2018 12:19 PM    midazolam (VERSED) injection (Discontinued)  As Needed 8/8/2018 8/8/2018    Sig - Route: Infuse  into a venous catheter As Needed. - Intravenous    Reason for Discontinue: Anesthesia Stop    ondansetron (ZOFRAN) injection (Discontinued)  As Needed 8/8/2018 8/8/2018    Sig - Route: Infuse  into a venous catheter As Needed for Nausea or Vomiting. - Intravenous    Reason for Discontinue: Anesthesia Stop    Pharmacy Consult - Pharmacy to dose (Discontinued)  Continuous PRN 8/8/2018 8/8/2018    Sig - Route: Continuous As Needed for Consult. - Does not apply    Reason for Discontinue: Dose adjustment    Cosign for Ordering: Accepted by Manolo Tripathi MD on 8/8/2018  4:16 PM    Pharmacy Consult - Pharmacy to dose (Discontinued)  Continuous PRN 8/8/2018 8/8/2018     Sig - Route: Continuous As Needed for Consult. - Does not apply    Reason for Discontinue: Dose adjustment    Cosign for Ordering: Accepted by Manolo Tripathi MD on 8/8/2018  4:16 PM    piperacillin-tazobactam (ZOSYN) 3.375 g/100 mL 0.9% NS IVPB (mbp) (Discontinued) 3.375 g Every 8 Hours 8/8/2018 8/8/2018    Sig - Route: Infuse 100 mL into a venous catheter Every 8 (Eight) Hours. - Intravenous    propofol (DIPRIVAN) infusion 10 mg/mL 100 mL (Discontinued)  Continuous PRN 8/8/2018 8/8/2018    Sig - Route: Infuse  into a venous catheter Continuous As Needed. - Intravenous    Reason for Discontinue: Anesthesia Stop    Propofol (DIPRIVAN) injection (Discontinued)  As Needed 8/8/2018 8/8/2018    Sig - Route: Infuse  into a venous catheter As Needed. - Intravenous    Reason for Discontinue: Anesthesia Stop    sodium chloride 0.9 % flush 1-10 mL (Discontinued) 1-10 mL As Needed 8/8/2018 8/8/2018    Sig - Route: Infuse 1-10 mL into a venous catheter As Needed for Line Care. - Intravenous    Reason for Discontinue: Patient Transfer    sodium chloride 1,000 mL with vancomycin 1,000 mg irrigation (Discontinued)  As Needed 8/8/2018 8/8/2018    Sig: As Needed.    Reason for Discontinue: Patient Discharge    sterile water irrigation solution (Discontinued)  As Needed 8/8/2018 8/8/2018    Sig: As Needed.    Reason for Discontinue: Patient Discharge            Cultures:    Blood Culture   Date Value Ref Range Status   08/08/2018 No growth at 24 hours  Preliminary   08/08/2018 No growth at 24 hours  Preliminary     Urine Culture   Date Value Ref Range Status   08/08/2018 Normal Urogenital Kelle  Preliminary           Assessment/Plan     ASSESSMENT:    1.  Septic arthritis versus severe cellulitis  2.  Lower extremity abscess    PLAN:    Patient resting in bed this morning, no issues or complaints.  WBC normal.  CRP improving at 12.45.  Left ankle culture from 8/8/18 finalized as MSSA.  Repeat left ankle culture preliminary shows  growth of gram-positive cocci consistent with staph species. Urine culture from 8/8/18 finalized as 50-60,000 colonies of normal urogenital kandace.    Blood cultures show no growth at 24 hours.  Chlamydia and gonorrhea antibodies in progress.    This is very concerning for septic arthritis due to decreased range of motion and very elevated CRP and white count most consistent with deep seated infection.     Differential diagnosis could be gonococcal arthritis  or inflammatory reactive arthritis.      Based on finalization of left ankle wound culture antibiotic therapy was changed to high-dose Rocephin monotherapy 2 g IV every 24 hours.  Vancomycin was discontinued for now.  CRP ordered for a.m.        Current antimicrobials:     Rocephin 2 g IV Q24H       Patient's findings and recommendations were discussed with patient, nursing staff, primary care team and consulting provider    Code Status:   Code Status and Medical Interventions:   Ordered at: 08/08/18 1559     Code Status:    CPR     Medical Interventions (Level of Support Prior to Arrest):    Full     Comments:    Any long term medical decisions to be made by her brother, Eric Oconnor who she reports to be her next of kin, in the event she can't decide for herself.       Alissa Ramirez, RADHA  08/10/18  12:12 PM

## 2018-08-11 ENCOUNTER — APPOINTMENT (OUTPATIENT)
Dept: ULTRASOUND IMAGING | Facility: HOSPITAL | Age: 33
End: 2018-08-11

## 2018-08-11 LAB
ALBUMIN SERPL-MCNC: 3.2 G/DL (ref 3.5–5)
ALBUMIN/GLOB SERPL: 1.1 G/DL (ref 1.5–2.5)
ALP SERPL-CCNC: 112 U/L (ref 35–104)
ALT SERPL W P-5'-P-CCNC: 63 U/L (ref 10–36)
ANION GAP SERPL CALCULATED.3IONS-SCNC: 7.1 MMOL/L (ref 3.6–11.2)
AST SERPL-CCNC: 49 U/L (ref 10–30)
BACTERIA SPEC AEROBE CULT: ABNORMAL
BASOPHILS # BLD AUTO: 0.07 10*3/MM3 (ref 0–0.3)
BASOPHILS NFR BLD AUTO: 0.7 % (ref 0–2)
BILIRUB SERPL-MCNC: 0.2 MG/DL (ref 0.2–1.8)
BUN BLD-MCNC: 10 MG/DL (ref 7–21)
BUN/CREAT SERPL: 20.8 (ref 7–25)
CALCIUM SPEC-SCNC: 8.5 MG/DL (ref 7.7–10)
CHLORIDE SERPL-SCNC: 108 MMOL/L (ref 99–112)
CO2 SERPL-SCNC: 26.9 MMOL/L (ref 24.3–31.9)
CREAT BLD-MCNC: 0.48 MG/DL (ref 0.43–1.29)
CRP SERPL-MCNC: 7.1 MG/DL (ref 0–0.99)
DEPRECATED RDW RBC AUTO: 38.4 FL (ref 37–54)
EOSINOPHIL # BLD AUTO: 0.22 10*3/MM3 (ref 0–0.7)
EOSINOPHIL NFR BLD AUTO: 2.1 % (ref 0–5)
ERYTHROCYTE [DISTWIDTH] IN BLOOD BY AUTOMATED COUNT: 12.2 % (ref 11.5–14.5)
FERRITIN SERPL-MCNC: 745 NG/ML (ref 10–290.3)
FOLATE SERPL-MCNC: 13.92 NG/ML (ref 5.4–20)
GFR SERPL CREATININE-BSD FRML MDRD: 147 ML/MIN/1.73
GLOBULIN UR ELPH-MCNC: 2.9 GM/DL
GLUCOSE BLD-MCNC: 170 MG/DL (ref 70–110)
GLUCOSE BLDC GLUCOMTR-MCNC: 148 MG/DL (ref 70–130)
GLUCOSE BLDC GLUCOMTR-MCNC: 158 MG/DL (ref 70–130)
GLUCOSE BLDC GLUCOMTR-MCNC: 208 MG/DL (ref 70–130)
GLUCOSE BLDC GLUCOMTR-MCNC: 233 MG/DL (ref 70–130)
GRAM STN SPEC: ABNORMAL
GRAM STN SPEC: ABNORMAL
HCT VFR BLD AUTO: 33.8 % (ref 37–47)
HGB BLD-MCNC: 11.4 G/DL (ref 12–16)
IMM GRANULOCYTES # BLD: 0.13 10*3/MM3 (ref 0–0.03)
IMM GRANULOCYTES NFR BLD: 1.2 % (ref 0–0.5)
IRON 24H UR-MRATE: 28 MCG/DL (ref 49–151)
IRON SATN MFR SERPL: 13 % (ref 15–50)
LYMPHOCYTES # BLD AUTO: 2.51 10*3/MM3 (ref 1–3)
LYMPHOCYTES NFR BLD AUTO: 24.1 % (ref 21–51)
MCH RBC QN AUTO: 30 PG (ref 27–33)
MCHC RBC AUTO-ENTMCNC: 33.7 G/DL (ref 33–37)
MCV RBC AUTO: 88.9 FL (ref 80–94)
MONOCYTES # BLD AUTO: 0.73 10*3/MM3 (ref 0.1–0.9)
MONOCYTES NFR BLD AUTO: 7 % (ref 0–10)
NEUTROPHILS # BLD AUTO: 6.77 10*3/MM3 (ref 1.4–6.5)
NEUTROPHILS NFR BLD AUTO: 64.9 % (ref 30–70)
OSMOLALITY SERPL CALC.SUM OF ELEC: 286.1 MOSM/KG (ref 273–305)
PLATELET # BLD AUTO: 469 10*3/MM3 (ref 130–400)
PMV BLD AUTO: 8.8 FL (ref 6–10)
POTASSIUM BLD-SCNC: 3.9 MMOL/L (ref 3.5–5.3)
PROT SERPL-MCNC: 6.1 G/DL (ref 6–8)
RBC # BLD AUTO: 3.8 10*6/MM3 (ref 4.2–5.4)
SODIUM BLD-SCNC: 142 MMOL/L (ref 135–153)
TIBC SERPL-MCNC: 212 MCG/DL (ref 241–421)
VIT B12 BLD-MCNC: 281 PG/ML (ref 211–911)
WBC NRBC COR # BLD: 10.43 10*3/MM3 (ref 4.5–12.5)

## 2018-08-11 PROCEDURE — 82607 VITAMIN B-12: CPT | Performed by: PHYSICIAN ASSISTANT

## 2018-08-11 PROCEDURE — 99232 SBSQ HOSP IP/OBS MODERATE 35: CPT | Performed by: PHYSICIAN ASSISTANT

## 2018-08-11 PROCEDURE — 82962 GLUCOSE BLOOD TEST: CPT

## 2018-08-11 PROCEDURE — 82746 ASSAY OF FOLIC ACID SERUM: CPT | Performed by: PHYSICIAN ASSISTANT

## 2018-08-11 PROCEDURE — 63710000001 INSULIN DETEMIR PER 5 UNITS: Performed by: INTERNAL MEDICINE

## 2018-08-11 PROCEDURE — 86140 C-REACTIVE PROTEIN: CPT | Performed by: PHYSICIAN ASSISTANT

## 2018-08-11 PROCEDURE — 93971 EXTREMITY STUDY: CPT | Performed by: RADIOLOGY

## 2018-08-11 PROCEDURE — 63710000001 INSULIN ASPART PER 5 UNITS: Performed by: PHYSICIAN ASSISTANT

## 2018-08-11 PROCEDURE — 94799 UNLISTED PULMONARY SVC/PX: CPT

## 2018-08-11 PROCEDURE — 80053 COMPREHEN METABOLIC PANEL: CPT | Performed by: PHYSICIAN ASSISTANT

## 2018-08-11 PROCEDURE — 93971 EXTREMITY STUDY: CPT

## 2018-08-11 PROCEDURE — 25010000002 HEPARIN (PORCINE) PER 1000 UNITS: Performed by: PHYSICIAN ASSISTANT

## 2018-08-11 PROCEDURE — 85025 COMPLETE CBC W/AUTO DIFF WBC: CPT | Performed by: PHYSICIAN ASSISTANT

## 2018-08-11 PROCEDURE — 83550 IRON BINDING TEST: CPT | Performed by: PHYSICIAN ASSISTANT

## 2018-08-11 PROCEDURE — 25010000002 CEFTRIAXONE: Performed by: INTERNAL MEDICINE

## 2018-08-11 PROCEDURE — 83540 ASSAY OF IRON: CPT | Performed by: PHYSICIAN ASSISTANT

## 2018-08-11 PROCEDURE — 82728 ASSAY OF FERRITIN: CPT | Performed by: PHYSICIAN ASSISTANT

## 2018-08-11 RX ORDER — MULTIVITAMIN
1 TABLET ORAL DAILY
Status: DISCONTINUED | OUTPATIENT
Start: 2018-08-11 | End: 2018-08-15 | Stop reason: HOSPADM

## 2018-08-11 RX ORDER — FERROUS SULFATE 325(65) MG
325 TABLET ORAL
Status: DISCONTINUED | OUTPATIENT
Start: 2018-08-11 | End: 2018-08-15 | Stop reason: HOSPADM

## 2018-08-11 RX ADMIN — HEPARIN SODIUM 5000 UNITS: 5000 INJECTION, SOLUTION INTRAVENOUS; SUBCUTANEOUS at 21:20

## 2018-08-11 RX ADMIN — HEPARIN SODIUM 5000 UNITS: 5000 INJECTION, SOLUTION INTRAVENOUS; SUBCUTANEOUS at 09:05

## 2018-08-11 RX ADMIN — INSULIN ASPART 5 UNITS: 100 INJECTION, SOLUTION INTRAVENOUS; SUBCUTANEOUS at 09:06

## 2018-08-11 RX ADMIN — IBUPROFEN 200 MG: 200 TABLET, FILM COATED ORAL at 04:39

## 2018-08-11 RX ADMIN — INSULIN ASPART 2 UNITS: 100 INJECTION, SOLUTION INTRAVENOUS; SUBCUTANEOUS at 21:37

## 2018-08-11 RX ADMIN — INSULIN DETEMIR 20 UNITS: 100 INJECTION, SOLUTION SUBCUTANEOUS at 21:38

## 2018-08-11 RX ADMIN — FERROUS SULFATE TAB 325 MG (65 MG ELEMENTAL FE) 325 MG: 325 (65 FE) TAB at 09:11

## 2018-08-11 RX ADMIN — INSULIN ASPART 5 UNITS: 100 INJECTION, SOLUTION INTRAVENOUS; SUBCUTANEOUS at 11:43

## 2018-08-11 RX ADMIN — INSULIN ASPART 2 UNITS: 100 INJECTION, SOLUTION INTRAVENOUS; SUBCUTANEOUS at 11:43

## 2018-08-11 RX ADMIN — FAMOTIDINE 20 MG: 20 TABLET, FILM COATED ORAL at 09:05

## 2018-08-11 RX ADMIN — Medication 1 TABLET: at 16:18

## 2018-08-11 RX ADMIN — CEFTRIAXONE 2 G: 2 INJECTION, POWDER, FOR SOLUTION INTRAMUSCULAR; INTRAVENOUS at 16:17

## 2018-08-11 RX ADMIN — INSULIN ASPART 4 UNITS: 100 INJECTION, SOLUTION INTRAVENOUS; SUBCUTANEOUS at 17:07

## 2018-08-11 RX ADMIN — INSULIN ASPART 7 UNITS: 100 INJECTION, SOLUTION INTRAVENOUS; SUBCUTANEOUS at 17:06

## 2018-08-11 RX ADMIN — Medication 1 CAPSULE: at 09:05

## 2018-08-11 NOTE — PLAN OF CARE
Problem: Patient Care Overview  Goal: Plan of Care Review  Outcome: Ongoing (interventions implemented as appropriate)   08/09/18 1539 08/11/18 0900   Coping/Psychosocial   Plan of Care Reviewed With --  patient   Plan of Care Review   Progress no change --      Goal: Individualization and Mutuality  Outcome: Ongoing (interventions implemented as appropriate)    Goal: Discharge Needs Assessment  Outcome: Ongoing (interventions implemented as appropriate)    Goal: Interprofessional Rounds/Family Conf  Outcome: Ongoing (interventions implemented as appropriate)      Problem: Pain, Acute (Adult)  Goal: Identify Related Risk Factors and Signs and Symptoms  Outcome: Ongoing (interventions implemented as appropriate)   08/09/18 1539   Pain, Acute (Adult)   Related Risk Factors (Acute Pain) patient perception;surgery   Signs and Symptoms (Acute Pain) BADLs/IADLs reluctance/inability to perform     Goal: Acceptable Pain Control/Comfort Level  Outcome: Ongoing (interventions implemented as appropriate)      Problem: Wound (Includes Pressure Injury) (Adult)  Goal: Signs and Symptoms of Listed Potential Problems Will be Absent, Minimized or Managed (Wound)  Outcome: Ongoing (interventions implemented as appropriate)   08/09/18 1539   Goal/Outcome Evaluation   Problems Assessed (Wound) all   Problems Present (Wound) infection;pain       Problem: Skin Injury Risk (Adult)  Goal: Identify Related Risk Factors and Signs and Symptoms  Outcome: Ongoing (interventions implemented as appropriate)   08/09/18 1539   Skin Injury Risk (Adult)   Related Risk Factors (Skin Injury Risk) infection     Goal: Skin Health and Integrity  Outcome: Ongoing (interventions implemented as appropriate)      Problem: Diabetes, Type 2 (Adult)  Goal: Signs and Symptoms of Listed Potential Problems Will be Absent, Minimized or Managed (Diabetes, Type 2)  Outcome: Ongoing (interventions implemented as appropriate)   08/09/18 1539   Goal/Outcome Evaluation    Problems Assessed (Type 2 Diabetes) all   Problems Present (Type 2 Diabetes) hyperglycemia

## 2018-08-11 NOTE — PROGRESS NOTES
"  I have personally seen and examined the patient today and discussed overnight interval progress and pertinent issues with nursing staff.    Subjective:    Awake and alert, states that her foot and leg seem to more swollen today. Still with significant errythema, wound vac in place. CRP continues to significantly improve down from 12.45 to 7.10. With normal WBC.    Left ankle culture from 8/8/18 finalized as MSSA.  Repeat left ankle culture preliminary shows growth of gram-positive cocci consistent with staph species. Urine culture from 8/8/18 finalized as 50-60,000 colonies of normal urogenital kandace.    History taken from: patient chart RN      Vital Signs    /95 (BP Location: Left arm, Patient Position: Lying)   Pulse 87   Temp 98.9 °F (37.2 °C) (Oral)   Resp 16   Ht 162.6 cm (64\")   Wt 79 kg (174 lb 1.6 oz)   SpO2 96%   BMI 29.88 kg/m²     Temp:  [97.9 °F (36.6 °C)-99 °F (37.2 °C)] 98.9 °F (37.2 °C)      Intake/Output Summary (Last 24 hours) at 08/11/18 1140  Last data filed at 08/11/18 0330   Gross per 24 hour   Intake              480 ml   Output                0 ml   Net              480 ml     Intake & Output (last 3 days)       08/08 0701 - 08/09 0700 08/09 0701 - 08/10 0700 08/10 0701 - 08/11 0700 08/11 0701 - 08/12 0700    P.O. 120 1320 960     I.V. (mL/kg) 1350 (17.1)       Total Intake(mL/kg) 1470 (18.6) 1320 (16.7) 960 (12.2)     Net +1470 +1320 +960              Unmeasured Urine Occurrence 2 x 2 x 3 x     Unmeasured Stool Occurrence 2 x 1 x            Physical exam:    General Appearance:    Alert, fever, chills and malaise    Head:    Normocephalic, without obvious abnormality, atraumatic   Eyes:            Lids and lashes normal, conjunctivae and sclerae normal, no   icterus, no pallor, corneas clear, PERRLA   Ears:    Ears appear intact with no abnormalities noted   Throat:   No oral lesions, no thrush, oral mucosa moist   Neck:   No adenopathy, supple, trachea midline, no " thyromegaly, no   carotid bruit, no JVD   Back:     No tenderness to percussion or palpation, range of motion   normal   Lungs:     Clear to auscultation,respirations regular, even and           unlabored. No wheezing, no ronchi and no crackles.    Heart:    Regular rhythm and normal rate, normal S1 and S2, no            murmur, no gallop, no rub, no click   Chest Wall:    No abnormalities observed   Abdomen:     Normal bowel sounds, no masses, no organomegaly, soft        non-tender, non-distended, no guarding, no rebound                tenderness   Rectal:     Deferred   Extremities:   Moves all extremities well, no edema, no cyanosis, no             redness   Pulses:   Pulses palpable and equal bilaterally   Skin:    wound VAC in place, less erythema and improving edema    Lymph nodes:   No palpable adenopathy   Neurologic:   Awake, alert and oriented x 3. Following commands.       Results:      Results from last 7 days  Lab Units 08/11/18  0406 08/10/18  0218 08/09/18  0150 08/08/18  1153   WBC 10*3/mm3 10.43 11.32 18.27* 21.04*     Lab Results   Component Value Date    NEUTROABS 6.77 (H) 08/11/2018         Results from last 7 days  Lab Units 08/11/18  0406   CREATININE mg/dL 0.48         Results from last 7 days  Lab Units 08/11/18  0406 08/10/18  0218 08/09/18  0150   CRP mg/dL 7.10* 12.45* 16.15*       Imaging Results (last 24 hours)     ** No results found for the last 24 hours. **            Results Review:    I have personally reviewed laboratory data, culture results, radiology studies and antimicrobial therapy.    Hospital Medications (active)       Dose Frequency Start End    acetaminophen (TYLENOL) tablet 650 mg 650 mg Every 6 Hours PRN 8/9/2018     Sig - Route: Take 2 tablets by mouth Every 6 (Six) Hours As Needed for Mild Pain  or Fever. - Oral    cefTRIAXone (ROCEPHIN) 2 g/100 mL 0.9% NS VTB (DENISHA) 2 g Every 24 Hours 8/8/2018 8/13/2018    Sig - Route: Infuse 100 mL into a venous catheter Daily. -  Intravenous    dextrose (D50W) solution 25 g 25 g Every 15 Minutes PRN 8/8/2018     Sig - Route: Infuse 50 mL into a venous catheter Every 15 (Fifteen) Minutes As Needed for Low Blood Sugar (Blood Sugar Less Than 70). - Intravenous    Cosign for Ordering: Accepted by Manolo Tripathi MD on 8/8/2018  4:16 PM    dextrose (D50W) solution 25 g 25 g Every 15 Minutes PRN 8/9/2018     Sig - Route: Infuse 50 mL into a venous catheter Every 15 (Fifteen) Minutes As Needed for Low Blood Sugar (Blood Sugar Less Than 70). - Intravenous    Cosign for Ordering: Accepted by Manolo Tripathi MD on 8/9/2018 12:19 PM    dextrose (GLUTOSE) oral gel 15 g 15 g Every 15 Minutes PRN 8/8/2018     Sig - Route: Take 15 g by mouth Every 15 (Fifteen) Minutes As Needed for Low Blood Sugar (Blood sugar less than 70). - Oral    Cosign for Ordering: Accepted by Manolo Tripathi MD on 8/8/2018  4:16 PM    dextrose (GLUTOSE) oral gel 15 g 15 g Every 15 Minutes PRN 8/9/2018     Sig - Route: Take 15 g by mouth Every 15 (Fifteen) Minutes As Needed for Low Blood Sugar (Blood sugar less than 70). - Oral    Cosign for Ordering: Accepted by Manolo Tripathi MD on 8/9/2018 12:19 PM    glucagon (human recombinant) (GLUCAGEN DIAGNOSTIC) injection 1 mg 1 mg As Needed 8/8/2018     Sig - Route: Inject 1 mg under the skin into the appropriate area as directed As Needed (Blood Glucose Less Than 70). - Subcutaneous    Cosign for Ordering: Accepted by Manolo Tripathi MD on 8/8/2018  4:16 PM    glucagon (human recombinant) (GLUCAGEN DIAGNOSTIC) injection 1 mg 1 mg As Needed 8/9/2018     Sig - Route: Inject 1 mg under the skin into the appropriate area as directed As Needed (Blood Glucose Less Than 70). - Subcutaneous    Cosign for Ordering: Accepted by Manolo Tripathi MD on 8/9/2018 12:19 PM    heparin (porcine) 5000 UNIT/ML injection 5,000 Units 5,000 Units Every 12 Hours Scheduled 8/8/2018     Sig - Route: Inject 1 mL under the skin into the appropriate area as directed  Every 12 (Twelve) Hours. - Subcutaneous    Cosign for Ordering: Accepted by Manolo Tripathi MD on 8/8/2018  4:16 PM    insulin aspart (novoLOG) injection 0-9 Units 0-9 Units 4 Times Daily Before Meals & Nightly 8/9/2018     Sig - Route: Inject 0-9 Units under the skin into the appropriate area as directed 4 (Four) Times a Day Before Meals & at Bedtime. - Subcutaneous    Cosign for Ordering: Accepted by Manolo Tripathi MD on 8/9/2018 12:19 PM    insulin detemir (LEVEMIR) injection 10 Units 10 Units Nightly 8/9/2018     Sig - Route: Inject 10 Units under the skin into the appropriate area as directed Every Night. - Subcutaneous    Cosign for Ordering: Accepted by Manolo Tripathi MD on 8/9/2018 12:19 PM    lactobacillus acidophilus (RISAQUAD) capsule 1 capsule 1 capsule Daily 8/9/2018     Sig - Route: Take 1 capsule by mouth Daily. - Oral    nitroglycerin (NITROSTAT) SL tablet 0.4 mg 0.4 mg Every 5 Minutes PRN 8/8/2018     Sig - Route: Place 1 tablet under the tongue Every 5 (Five) Minutes As Needed for Chest Pain (if systolic BP greater than 100 mm/Hg.). - Sublingual    Cosign for Ordering: Accepted by Manolo Tripathi MD on 8/8/2018  4:16 PM    sodium chloride 0.9 % flush 1-10 mL 1-10 mL As Needed 8/8/2018     Sig - Route: Infuse 1-10 mL into a venous catheter As Needed for Line Care. - Intravenous    Cosign for Ordering: Accepted by Manolo Tripathi MD on 8/8/2018  4:16 PM    sodium chloride 0.9 % flush 10 mL 10 mL As Needed 8/8/2018     Sig - Route: Infuse 10 mL into a venous catheter As Needed for Line Care. - Intravenous    Cosign for Ordering: Accepted by Placido Holland MD on 8/8/2018  1:14 PM    sodium chloride 0.9 % infusion 125 mL/hr Continuous 8/8/2018     Sig - Route: Infuse 125 mL/hr into a venous catheter Continuous. - Intravenous    Cosign for Ordering: Accepted by Manolo Tripathi MD on 8/8/2018  4:16 PM    vancomycin (VANCOCIN) 1,250 mg in sodium chloride 0.9 % 250 mL IVPB 1,250 mg Every 12 Hours  8/9/2018 8/15/2018    Sig - Route: Infuse 1,250 mg into a venous catheter Every 12 (Twelve) Hours. - Intravenous    vancomycin (VANCOCIN) 1,500 mg in sodium chloride 0.9 % 500 mL IVPB 20 mg/kg × 79.4 kg Once 8/8/2018 8/8/2018    Sig - Route: Infuse 1,500 mg into a venous catheter 1 (One) Time. - Intravenous    Cosign for Ordering: Accepted by Placido Holland MD on 8/8/2018  1:14 PM    fentaNYL citrate (PF) (SUBLIMAZE) injection (Discontinued)  As Needed 8/8/2018 8/8/2018    Sig - Route: Infuse  into a venous catheter As Needed for Severe Pain . - Intravenous    Reason for Discontinue: Anesthesia Stop    lactated ringers infusion (Discontinued) 125 mL/hr Continuous 8/8/2018 8/9/2018    Sig - Route: Infuse 125 mL/hr into a venous catheter Continuous. - Intravenous    metFORMIN (GLUCOPHAGE) tablet 500 mg (Discontinued) 500 mg 2 Times Daily With Meals 8/9/2018 8/9/2018    Sig - Route: Take 1 tablet by mouth 2 (Two) Times a Day With Meals. - Oral    Cosign for Ordering: Accepted by Manolo Tripathi MD on 8/9/2018 12:19 PM    midazolam (VERSED) injection (Discontinued)  As Needed 8/8/2018 8/8/2018    Sig - Route: Infuse  into a venous catheter As Needed. - Intravenous    Reason for Discontinue: Anesthesia Stop    ondansetron (ZOFRAN) injection (Discontinued)  As Needed 8/8/2018 8/8/2018    Sig - Route: Infuse  into a venous catheter As Needed for Nausea or Vomiting. - Intravenous    Reason for Discontinue: Anesthesia Stop    Pharmacy Consult - Pharmacy to dose (Discontinued)  Continuous PRN 8/8/2018 8/8/2018    Sig - Route: Continuous As Needed for Consult. - Does not apply    Reason for Discontinue: Dose adjustment    Cosign for Ordering: Accepted by Manolo Tripathi MD on 8/8/2018  4:16 PM    Pharmacy Consult - Pharmacy to dose (Discontinued)  Continuous PRN 8/8/2018 8/8/2018    Sig - Route: Continuous As Needed for Consult. - Does not apply    Reason for Discontinue: Dose adjustment    Cosign for Ordering: Accepted  by Manolo Tripathi MD on 8/8/2018  4:16 PM    piperacillin-tazobactam (ZOSYN) 3.375 g/100 mL 0.9% NS IVPB (mbp) (Discontinued) 3.375 g Every 8 Hours 8/8/2018 8/8/2018    Sig - Route: Infuse 100 mL into a venous catheter Every 8 (Eight) Hours. - Intravenous    propofol (DIPRIVAN) infusion 10 mg/mL 100 mL (Discontinued)  Continuous PRN 8/8/2018 8/8/2018    Sig - Route: Infuse  into a venous catheter Continuous As Needed. - Intravenous    Reason for Discontinue: Anesthesia Stop    Propofol (DIPRIVAN) injection (Discontinued)  As Needed 8/8/2018 8/8/2018    Sig - Route: Infuse  into a venous catheter As Needed. - Intravenous    Reason for Discontinue: Anesthesia Stop    sodium chloride 0.9 % flush 1-10 mL (Discontinued) 1-10 mL As Needed 8/8/2018 8/8/2018    Sig - Route: Infuse 1-10 mL into a venous catheter As Needed for Line Care. - Intravenous    Reason for Discontinue: Patient Transfer    sodium chloride 1,000 mL with vancomycin 1,000 mg irrigation (Discontinued)  As Needed 8/8/2018 8/8/2018    Sig: As Needed.    Reason for Discontinue: Patient Discharge    sterile water irrigation solution (Discontinued)  As Needed 8/8/2018 8/8/2018    Sig: As Needed.    Reason for Discontinue: Patient Discharge            Cultures:    Blood Culture   Date Value Ref Range Status   08/08/2018 No growth at 24 hours  Preliminary   08/08/2018 No growth at 24 hours  Preliminary     Urine Culture   Date Value Ref Range Status   08/08/2018 Normal Urogenital Kelle  Preliminary           Assessment/Plan     ASSESSMENT:    1.  Septic arthritis versus severe cellulitis  2.  Lower extremity abscess    PLAN:    Awake and alert, states that her foot and leg seem to more swollen today. Still with significant errythema, wound vac in place. CRP continues to significantly improve down from 12.45 to 7.10. With normal WBC.    Left ankle culture from 8/8/18 finalized as MSSA.  Repeat left ankle culture preliminary shows growth of gram-positive cocci  consistent with staph species. Urine culture from 8/8/18 finalized as 50-60,000 colonies of normal urogenital kandace.    Blood cultures show no growth at 48 hours.  Chlamydia and gonorrhea antibodies in progress.    This is very concerning for septic arthritis due to decreased range of motion and very elevated CRP and white count most consistent with deep seated infection.     Differential diagnosis could be gonococcal arthritis  or inflammatory reactive arthritis.      Based on finalization of left ankle wound culture antibiotic therapy was changed to high-dose Rocephin monotherapy 2 g IV every 24 hours.  Vancomycin was discontinued for now.     CRP ordered for a.m.        Current antimicrobials:     Rocephin 2 g IV Q24H       Patient's findings and recommendations were discussed with patient, nursing staff, primary care team and consulting provider    Code Status:   Code Status and Medical Interventions:   Ordered at: 08/08/18 1559     Code Status:    CPR     Medical Interventions (Level of Support Prior to Arrest):    Full     Comments:    Any long term medical decisions to be made by her brother, Eric Oconnor who she reports to be her next of kin, in the event she can't decide for herself.       Sharri Patiño, RADHA  08/11/18  11:40 AM

## 2018-08-12 LAB
ALBUMIN SERPL-MCNC: 3 G/DL (ref 3.5–5)
ALBUMIN/GLOB SERPL: 1.1 G/DL (ref 1.5–2.5)
ALP SERPL-CCNC: 117 U/L (ref 35–104)
ALT SERPL W P-5'-P-CCNC: 47 U/L (ref 10–36)
ANION GAP SERPL CALCULATED.3IONS-SCNC: 7.1 MMOL/L (ref 3.6–11.2)
AST SERPL-CCNC: 48 U/L (ref 10–30)
BASOPHILS # BLD AUTO: 0.04 10*3/MM3 (ref 0–0.3)
BASOPHILS NFR BLD AUTO: 0.4 % (ref 0–2)
BILIRUB SERPL-MCNC: 0.2 MG/DL (ref 0.2–1.8)
BUN BLD-MCNC: 7 MG/DL (ref 7–21)
BUN/CREAT SERPL: 10.8 (ref 7–25)
CALCIUM SPEC-SCNC: 8.2 MG/DL (ref 7.7–10)
CHLORIDE SERPL-SCNC: 106 MMOL/L (ref 99–112)
CO2 SERPL-SCNC: 26.9 MMOL/L (ref 24.3–31.9)
CREAT BLD-MCNC: 0.65 MG/DL (ref 0.43–1.29)
CRP SERPL-MCNC: 4.81 MG/DL (ref 0–0.99)
DEPRECATED RDW RBC AUTO: 38.2 FL (ref 37–54)
EOSINOPHIL # BLD AUTO: 0.22 10*3/MM3 (ref 0–0.7)
EOSINOPHIL NFR BLD AUTO: 2.3 % (ref 0–5)
ERYTHROCYTE [DISTWIDTH] IN BLOOD BY AUTOMATED COUNT: 12.1 % (ref 11.5–14.5)
GFR SERPL CREATININE-BSD FRML MDRD: 104 ML/MIN/1.73
GLOBULIN UR ELPH-MCNC: 2.7 GM/DL
GLUCOSE BLD-MCNC: 196 MG/DL (ref 70–110)
GLUCOSE BLDC GLUCOMTR-MCNC: 129 MG/DL (ref 70–130)
GLUCOSE BLDC GLUCOMTR-MCNC: 179 MG/DL (ref 70–130)
GLUCOSE BLDC GLUCOMTR-MCNC: 222 MG/DL (ref 70–130)
GLUCOSE BLDC GLUCOMTR-MCNC: 98 MG/DL (ref 70–130)
HCT VFR BLD AUTO: 32.6 % (ref 37–47)
HGB BLD-MCNC: 11 G/DL (ref 12–16)
IMM GRANULOCYTES # BLD: 0.2 10*3/MM3 (ref 0–0.03)
IMM GRANULOCYTES NFR BLD: 2.1 % (ref 0–0.5)
LYMPHOCYTES # BLD AUTO: 2.98 10*3/MM3 (ref 1–3)
LYMPHOCYTES NFR BLD AUTO: 31.1 % (ref 21–51)
MCH RBC QN AUTO: 30.1 PG (ref 27–33)
MCHC RBC AUTO-ENTMCNC: 33.7 G/DL (ref 33–37)
MCV RBC AUTO: 89.1 FL (ref 80–94)
MONOCYTES # BLD AUTO: 0.65 10*3/MM3 (ref 0.1–0.9)
MONOCYTES NFR BLD AUTO: 6.8 % (ref 0–10)
NEUTROPHILS # BLD AUTO: 5.49 10*3/MM3 (ref 1.4–6.5)
NEUTROPHILS NFR BLD AUTO: 57.3 % (ref 30–70)
OSMOLALITY SERPL CALC.SUM OF ELEC: 282.8 MOSM/KG (ref 273–305)
PLATELET # BLD AUTO: 496 10*3/MM3 (ref 130–400)
PMV BLD AUTO: 9 FL (ref 6–10)
POTASSIUM BLD-SCNC: 3.6 MMOL/L (ref 3.5–5.3)
POTASSIUM BLD-SCNC: 4.1 MMOL/L (ref 3.5–5.3)
PROT SERPL-MCNC: 5.7 G/DL (ref 6–8)
RBC # BLD AUTO: 3.66 10*6/MM3 (ref 4.2–5.4)
SODIUM BLD-SCNC: 140 MMOL/L (ref 135–153)
WBC NRBC COR # BLD: 9.58 10*3/MM3 (ref 4.5–12.5)

## 2018-08-12 PROCEDURE — 85025 COMPLETE CBC W/AUTO DIFF WBC: CPT | Performed by: PHYSICIAN ASSISTANT

## 2018-08-12 PROCEDURE — 63710000001 INSULIN ASPART PER 5 UNITS: Performed by: INTERNAL MEDICINE

## 2018-08-12 PROCEDURE — 99232 SBSQ HOSP IP/OBS MODERATE 35: CPT | Performed by: INTERNAL MEDICINE

## 2018-08-12 PROCEDURE — 86140 C-REACTIVE PROTEIN: CPT | Performed by: PHYSICIAN ASSISTANT

## 2018-08-12 PROCEDURE — 94799 UNLISTED PULMONARY SVC/PX: CPT

## 2018-08-12 PROCEDURE — 63710000001 INSULIN ASPART PER 5 UNITS: Performed by: PHYSICIAN ASSISTANT

## 2018-08-12 PROCEDURE — 25010000002 CEFTRIAXONE: Performed by: INTERNAL MEDICINE

## 2018-08-12 PROCEDURE — 63710000001 INSULIN DETEMIR PER 5 UNITS: Performed by: INTERNAL MEDICINE

## 2018-08-12 PROCEDURE — 84132 ASSAY OF SERUM POTASSIUM: CPT | Performed by: INTERNAL MEDICINE

## 2018-08-12 PROCEDURE — 82962 GLUCOSE BLOOD TEST: CPT

## 2018-08-12 PROCEDURE — 80053 COMPREHEN METABOLIC PANEL: CPT | Performed by: PHYSICIAN ASSISTANT

## 2018-08-12 PROCEDURE — 25010000002 HEPARIN (PORCINE) PER 1000 UNITS: Performed by: PHYSICIAN ASSISTANT

## 2018-08-12 RX ORDER — POTASSIUM CHLORIDE 20 MEQ/1
40 TABLET, EXTENDED RELEASE ORAL EVERY 4 HOURS
Status: COMPLETED | OUTPATIENT
Start: 2018-08-12 | End: 2018-08-12

## 2018-08-12 RX ADMIN — INSULIN DETEMIR 25 UNITS: 100 INJECTION, SOLUTION SUBCUTANEOUS at 20:24

## 2018-08-12 RX ADMIN — FERROUS SULFATE TAB 325 MG (65 MG ELEMENTAL FE) 325 MG: 325 (65 FE) TAB at 08:13

## 2018-08-12 RX ADMIN — CEFTRIAXONE 2 G: 2 INJECTION, POWDER, FOR SOLUTION INTRAMUSCULAR; INTRAVENOUS at 16:45

## 2018-08-12 RX ADMIN — FAMOTIDINE 20 MG: 20 TABLET, FILM COATED ORAL at 08:13

## 2018-08-12 RX ADMIN — INSULIN ASPART 10 UNITS: 100 INJECTION, SOLUTION INTRAVENOUS; SUBCUTANEOUS at 11:39

## 2018-08-12 RX ADMIN — INSULIN ASPART 10 UNITS: 100 INJECTION, SOLUTION INTRAVENOUS; SUBCUTANEOUS at 16:46

## 2018-08-12 RX ADMIN — HEPARIN SODIUM 5000 UNITS: 5000 INJECTION, SOLUTION INTRAVENOUS; SUBCUTANEOUS at 08:13

## 2018-08-12 RX ADMIN — Medication 1 TABLET: at 08:13

## 2018-08-12 RX ADMIN — INSULIN ASPART 2 UNITS: 100 INJECTION, SOLUTION INTRAVENOUS; SUBCUTANEOUS at 08:13

## 2018-08-12 RX ADMIN — HEPARIN SODIUM 5000 UNITS: 5000 INJECTION, SOLUTION INTRAVENOUS; SUBCUTANEOUS at 20:23

## 2018-08-12 RX ADMIN — IBUPROFEN 200 MG: 200 TABLET, FILM COATED ORAL at 08:13

## 2018-08-12 RX ADMIN — INSULIN ASPART 4 UNITS: 100 INJECTION, SOLUTION INTRAVENOUS; SUBCUTANEOUS at 20:23

## 2018-08-12 RX ADMIN — POTASSIUM CHLORIDE 40 MEQ: 1500 TABLET, EXTENDED RELEASE ORAL at 08:13

## 2018-08-12 RX ADMIN — INSULIN ASPART 7 UNITS: 100 INJECTION, SOLUTION INTRAVENOUS; SUBCUTANEOUS at 08:14

## 2018-08-12 RX ADMIN — POTASSIUM CHLORIDE 40 MEQ: 1500 TABLET, EXTENDED RELEASE ORAL at 05:16

## 2018-08-12 RX ADMIN — Medication 1 CAPSULE: at 08:13

## 2018-08-12 NOTE — PLAN OF CARE
Problem: Patient Care Overview  Goal: Plan of Care Review  Outcome: Ongoing (interventions implemented as appropriate)   08/09/18 1539 08/12/18 1000   Coping/Psychosocial   Plan of Care Reviewed With --  patient   Plan of Care Review   Progress no change --      Goal: Individualization and Mutuality  Outcome: Ongoing (interventions implemented as appropriate)    Goal: Discharge Needs Assessment  Outcome: Outcome(s) achieved Date Met: 08/12/18    Goal: Interprofessional Rounds/Family Conf  Outcome: Ongoing (interventions implemented as appropriate)      Problem: Pain, Acute (Adult)  Goal: Identify Related Risk Factors and Signs and Symptoms  Outcome: Ongoing (interventions implemented as appropriate)   08/09/18 1539   Pain, Acute (Adult)   Related Risk Factors (Acute Pain) patient perception;surgery   Signs and Symptoms (Acute Pain) BADLs/IADLs reluctance/inability to perform     Goal: Acceptable Pain Control/Comfort Level  Outcome: Ongoing (interventions implemented as appropriate)      Problem: Wound (Includes Pressure Injury) (Adult)  Goal: Signs and Symptoms of Listed Potential Problems Will be Absent, Minimized or Managed (Wound)  Outcome: Ongoing (interventions implemented as appropriate)   08/09/18 1539   Goal/Outcome Evaluation   Problems Assessed (Wound) all   Problems Present (Wound) infection;pain       Problem: Skin Injury Risk (Adult)  Goal: Identify Related Risk Factors and Signs and Symptoms  Outcome: Ongoing (interventions implemented as appropriate)   08/09/18 1539   Skin Injury Risk (Adult)   Related Risk Factors (Skin Injury Risk) infection     Goal: Skin Health and Integrity  Outcome: Ongoing (interventions implemented as appropriate)      Problem: Diabetes, Type 2 (Adult)  Goal: Signs and Symptoms of Listed Potential Problems Will be Absent, Minimized or Managed (Diabetes, Type 2)  Outcome: Ongoing (interventions implemented as appropriate)   08/09/18 1539   Goal/Outcome Evaluation   Problems  Assessed (Type 2 Diabetes) all   Problems Present (Type 2 Diabetes) hyperglycemia

## 2018-08-12 NOTE — PROGRESS NOTES
Assisted By: Alexandria BHAT    CC: Follow-up left ankle ulcer    Interview History/HPI: Patient states she actually is feeling okay, she states she is having some edema of the left lower extremity but otherwise minimal pain in the ankle area.  She's had no fever chills overnight, tolerating diet, no chest pain or shortness breath.      Vitals:    08/12/18 0700   BP: 147/86   Pulse: 90   Resp: 16   Temp: 98.7 °F (37.1 °C)   SpO2:        Intake/Output Summary (Last 24 hours) at 08/12/18 1059  Last data filed at 08/12/18 0400   Gross per 24 hour   Intake              940 ml   Output                0 ml   Net              940 ml       EXAM: Well-developed note distress mood is good skin warm and dry.  Lungs have bilateral breath sounds clear no rhonchi rales or wheezing heard, heart regular rate and rhythm without murmur gallop.  Abdomen is soft benign vascular active, the wound VAC is in place over the lateral ankle wound.  Anterior to this the ulceration appears be healing well cellulitis is improving she still has about 2+ pitting edema of the foot.  Trace of pretibial edema of the leg.  She has good palpable pulses.  She moves her toes well and she can move her ankle without pain.    LABS:   Lab Results (last 48 hours)     Procedure Component Value Units Date/Time    POC Glucose Once [[id number]]  (Abnormal) Collected:  08/12/18 0705    Specimen:  Blood Updated:  08/12/18 0711     Glucose 179 (H) mg/dL     Narrative:       Meter: [id number] : [id number] dayana abarca    C-reactive Protein [[id number]]  (Abnormal) Collected:  08/12/18 0108    Specimen:  Blood Updated:  08/12/18 0229     C-Reactive Protein 4.81 (H) mg/dL     Comprehensive Metabolic Panel [[id number]]  (Abnormal) Collected:  08/12/18 0108    Specimen:  Blood Updated:  08/12/18 0221     Glucose 196 (H) mg/dL      BUN 7 mg/dL      Creatinine 0.65 mg/dL      Sodium 140 mmol/L      Potassium 3.6 mmol/L      Chloride 106 mmol/L      CO2 26.9 mmol/L      Calcium  8.2 mg/dL      Total Protein 5.7 (L) g/dL      Albumin 3.00 (L) g/dL      ALT (SGPT) 47 (H) U/L      AST (SGOT) 48 (H) U/L      Alkaline Phosphatase 117 (H) U/L      Comment: Note New Reference Ranges        Total Bilirubin 0.2 mg/dL      eGFR Non African Amer 104 mL/min/1.73      Globulin 2.7 gm/dL      A/G Ratio 1.1 (L) g/dL      BUN/Creatinine Ratio 10.8     Anion Gap 7.1 mmol/L     Osmolality, Calculated [[id number]]  (Normal) Collected:  08/12/18 0108    Specimen:  Blood Updated:  08/12/18 0221     Osmolality Calc 282.8 mOsm/kg     CBC & Differential [[id number]] Collected:  08/12/18 0108    Specimen:  Blood Updated:  08/12/18 0201    Narrative:       The following orders were created for panel order CBC & Differential.  Procedure                               Abnormality         Status                     ---------                               -----------         ------                     CBC Auto Differential[[id number]]        Abnormal            Final result                 Please view results for these tests on the individual orders.    CBC Auto Differential [[id number]]  (Abnormal) Collected:  08/12/18 0108    Specimen:  Blood Updated:  08/12/18 0201     WBC 9.58 10*3/mm3      RBC 3.66 (L) 10*6/mm3      Hemoglobin 11.0 (L) g/dL      Hematocrit 32.6 (L) %      MCV 89.1 fL      MCH 30.1 pg      MCHC 33.7 g/dL      RDW 12.1 %      RDW-SD 38.2 fl      MPV 9.0 fL      Platelets 496 (H) 10*3/mm3      Neutrophil % 57.3 %      Lymphocyte % 31.1 %      Monocyte % 6.8 %      Eosinophil % 2.3 %      Basophil % 0.4 %      Immature Grans % 2.1 (H) %      Neutrophils, Absolute 5.49 10*3/mm3      Lymphocytes, Absolute 2.98 10*3/mm3      Monocytes, Absolute 0.65 10*3/mm3      Eosinophils, Absolute 0.22 10*3/mm3      Basophils, Absolute 0.04 10*3/mm3      Immature Grans, Absolute 0.20 (H) 10*3/mm3     POC Glucose Once [[id number]]  (Abnormal) Collected:  08/11/18 2122    Specimen:  Blood Updated:  08/11/18 2138     Glucose  208 (H) mg/dL     Narrative:       Meter: [id number] : [id number] harriet everett    POC Glucose Once [[id number]]  (Abnormal) Collected:  08/11/18 1625    Specimen:  Blood Updated:  08/11/18 1631     Glucose 233 (H) mg/dL     Narrative:       Meter: [id number] : [id number] belem robles    Blood Culture - Blood, [[id number]]  (Normal) Collected:  08/08/18 1140    Specimen:  Blood from Arm, Right Updated:  08/11/18 1230     Blood Culture No growth at 3 days    Blood Culture - Blood, [[id number]]  (Normal) Collected:  08/08/18 1153    Specimen:  Blood from Arm, Left Updated:  08/11/18 1230     Blood Culture No growth at 3 days    Wound Culture - Wound, Ankle, Left [[id number]]  (Abnormal)  (Susceptibility) Collected:  08/08/18 1856    Specimen:  Wound from Ankle, Left Updated:  08/11/18 1157     Wound Culture Heavy growth (4+) Staphylococcus aureus (A)     Gram Stain Result Many (4+) WBCs seen      Moderate (3+) Gram positive cocci in pairs and clusters    Susceptibility      Staphylococcus aureus     SUSSY     Amoxicillin + Clavulanate <=4/2 ug/ml Susceptible     Ampicillin + Sulbactam <=8/4 ug/ml Susceptible     Ceftriaxone <=8 ug/ml Susceptible     Ciprofloxacin <=1 ug/ml Susceptible     Clindamycin <=0.5 ug/ml Susceptible     Erythromycin <=0.5 ug/ml Susceptible     Gentamicin <=4 ug/ml Susceptible     Levofloxacin <=1 ug/ml Susceptible  [1]      Moxifloxacin <=0.5 ug/ml Susceptible     Oxacillin <=0.25 ug/ml Susceptible     Penicillin G 0.12 ug/ml Susceptible     Rifampin <=1 ug/ml Susceptible     Tetracycline <=4 ug/ml Susceptible     Trimethoprim + Sulfamethoxazole <=0.5/9.5 ug/ml Susceptible     Vancomycin 2 ug/ml Susceptible            [1]   Staphylococcus species may develop resistance during prolonged therapy with quinolones.  Isolates that are initially susceptible may become resistant within three to four days after initiation of therapy. Testing of repeat isolates may be warranted.                    POC Glucose Once [[id number]]  (Abnormal) Collected:  08/11/18 1141    Specimen:  Blood Updated:  08/11/18 1147     Glucose 158 (H) mg/dL     Narrative:       Meter: [id number] : [id number] belem robles    POC Glucose Once [[id number]]  (Abnormal) Collected:  08/11/18 0641    Specimen:  Blood Updated:  08/11/18 0647     Glucose 148 (H) mg/dL     Narrative:       Meter: [id number] : [id number] karen zhao    Vitamin B12 [[id number]]  (Normal) Collected:  08/11/18 0406    Specimen:  Blood Updated:  08/11/18 0503     Vitamin B-12 281 pg/mL     Folate [[id number]]  (Normal) Collected:  08/11/18 0406    Specimen:  Blood Updated:  08/11/18 0503     Folate 13.92 ng/mL     Ferritin [[id number]]  (Abnormal) Collected:  08/11/18 0406    Specimen:  Blood Updated:  08/11/18 0503     Ferritin 745.00 (H) ng/mL     C-reactive Protein [[id number]]  (Abnormal) Collected:  08/11/18 0406    Specimen:  Blood Updated:  08/11/18 0500     C-Reactive Protein 7.10 (H) mg/dL     Iron Profile [[id number]]  (Abnormal) Collected:  08/11/18 0406    Specimen:  Blood Updated:  08/11/18 0500     Iron 28 (L) mcg/dL      TIBC 212 (L) mcg/dL      Iron Saturation 13 (L) %     Comprehensive Metabolic Panel [[id number]]  (Abnormal) Collected:  08/11/18 0406    Specimen:  Blood Updated:  08/11/18 0456     Glucose 170 (H) mg/dL      BUN 10 mg/dL      Creatinine 0.48 mg/dL      Sodium 142 mmol/L      Potassium 3.9 mmol/L      Chloride 108 mmol/L      CO2 26.9 mmol/L      Calcium 8.5 mg/dL      Total Protein 6.1 g/dL      Albumin 3.20 (L) g/dL      ALT (SGPT) 63 (H) U/L      AST (SGOT) 49 (H) U/L      Alkaline Phosphatase 112 (H) U/L      Comment: Note New Reference Ranges        Total Bilirubin 0.2 mg/dL      eGFR Non African Amer 147 mL/min/1.73      Globulin 2.9 gm/dL      A/G Ratio 1.1 (L) g/dL      BUN/Creatinine Ratio 20.8     Anion Gap 7.1 mmol/L     Osmolality, Calculated [[id number]]  (Normal) Collected:  08/11/18 0406    Specimen:  Blood Updated:   08/11/18 0456     Osmolality Calc 286.1 mOsm/kg     CBC & Differential [[id number]] Collected:  08/11/18 0406    Specimen:  Blood Updated:  08/11/18 0429    Narrative:       The following orders were created for panel order CBC & Differential.  Procedure                               Abnormality         Status                     ---------                               -----------         ------                     CBC Auto Differential[[id number]]        Abnormal            Final result                 Please view results for these tests on the individual orders.    CBC Auto Differential [[id number]]  (Abnormal) Collected:  08/11/18 0406    Specimen:  Blood Updated:  08/11/18 0429     WBC 10.43 10*3/mm3      RBC 3.80 (L) 10*6/mm3      Hemoglobin 11.4 (L) g/dL      Hematocrit 33.8 (L) %      MCV 88.9 fL      MCH 30.0 pg      MCHC 33.7 g/dL      RDW 12.2 %      RDW-SD 38.4 fl      MPV 8.8 fL      Platelets 469 (H) 10*3/mm3      Neutrophil % 64.9 %      Lymphocyte % 24.1 %      Monocyte % 7.0 %      Eosinophil % 2.1 %      Basophil % 0.7 %      Immature Grans % 1.2 (H) %      Neutrophils, Absolute 6.77 (H) 10*3/mm3      Lymphocytes, Absolute 2.51 10*3/mm3      Monocytes, Absolute 0.73 10*3/mm3      Eosinophils, Absolute 0.22 10*3/mm3      Basophils, Absolute 0.07 10*3/mm3      Immature Grans, Absolute 0.13 (H) 10*3/mm3     POC Glucose Once [[id number]]  (Abnormal) Collected:  08/10/18 1935    Specimen:  Blood Updated:  08/10/18 1941     Glucose 214 (H) mg/dL     Narrative:       Meter: [id number] : [id number] Matteo STEPHENSON    POC Glucose Once [[id number]]  (Abnormal) Collected:  08/10/18 1648    Specimen:  Blood Updated:  08/10/18 1711     Glucose 199 (H) mg/dL     Narrative:       Meter: [id number] : [id number] Mikal FLYNN    Potassium [[id number]]  (Normal) Collected:  08/10/18 1602    Specimen:  Blood Updated:  08/10/18 1637     Potassium 3.9 mmol/L     C-Peptide [[id number]]  (Abnormal) Collected:  08/09/18  1325    Specimen:  Blood Updated:  08/10/18 1313     C-Peptide 5.9 (H) ng/mL      Comment: C-Peptide reference interval is for fasting patients.       Narrative:       Performed at:  [address] [place] [address]  [id number]  : Ricco Zhang PhD, Phone:  [phone]    POC Glucose Once [[id number]]  (Abnormal) Collected:  08/10/18 1209    Specimen:  Blood Updated:  08/10/18 1238     Glucose 241 (H) mg/dL     Narrative:       Meter: [id number] : [id number] Mikal Liya GEE    Vancomycin, Trough [[id number]]  (Normal) Collected:  08/10/18 1108    Specimen:  Blood Updated:  08/10/18 1213     Vancomycin Trough 7.00 mcg/mL     Hepatitis Panel, Acute [[id number]]  (Normal) Collected:  08/10/18 0218    Specimen:  Blood Updated:  08/10/18 1210     Hepatitis B Surface Ag Non-Reactive     Hep A IgM Non-Reactive     Hep B C IgM Non-Reactive     Hepatitis C Ab Non-Reactive          Radiology:  Imaging Results (last 72 hours)     Procedure Component Value Units Date/Time    US Venous Doppler Lower Extremity Left (duplex) [[id number]] Collected:  08/12/18 1052     Updated:  08/12/18 1054    Narrative:       EXAMINATION: US VENOUS DOPPLER LOWER EXTREMITY LEFT (DUPLEX)-      CLINICAL INDICATION:  LLE edema; A41.9-Sepsis, unspecified organism;  L03.116-Cellulitis of left lower limb; R73.9-Hyperglycemia, unspecified     TECHNIQUE: Multiplanar gray scale and Doppler vascular sonographic  imaging of the deep veins of LEFT lower extremity, without and with  compression.      COMPARISON: NONE      FINDINGS:   The visualized deep veins demonstrate flow and are compressible. No  evidence of deep venous thrombosis.   Mild edema noted.       Impression:       No DVT. Soft tissue edema noted.     This report was finalized on 8/12/2018 10:52 AM by Dr. Erlin Chong MD.             Assessment/Plan:   MSSA cellulitis and abscess with possible spider bite.  Continue IV Rocephin for now, infectious disease is  following.  Patient is also receiving wound VAC.  This will be changed tomorrow.  The edges of the wound and the wound itself needs to be reevaluated prior to definite plans for discharge.  Hopefully once this is done arrangements could be made for outpatient antibiotics.  Will leave length of therapy and decision of de-escalation to infectious disease.    Diabetes, new diagnosis for this patient.  Continuing to adjust insulin, C-peptide was over 4 consistent with however type 2 diabetes.    DVT prophylaxis, subcutaneous heparin    Diabetes, improving control, continue to adjust insulin, follow    Mildly elevated transaminases, overall appears to be stable and asymptomatic, monitor    Edema of the left lower extremity secondary to the infectious process, Doppler negative

## 2018-08-12 NOTE — PLAN OF CARE
Problem: Patient Care Overview  Goal: Plan of Care Review  Outcome: Ongoing (interventions implemented as appropriate)   08/09/18 1539 08/12/18 1000   Coping/Psychosocial   Plan of Care Reviewed With --  patient   Plan of Care Review   Progress no change --      Goal: Individualization and Mutuality  Outcome: Ongoing (interventions implemented as appropriate)    Goal: Discharge Needs Assessment  Outcome: Ongoing (interventions implemented as appropriate)    Goal: Interprofessional Rounds/Family Conf  Outcome: Ongoing (interventions implemented as appropriate)      Problem: Pain, Acute (Adult)  Goal: Identify Related Risk Factors and Signs and Symptoms  Outcome: Ongoing (interventions implemented as appropriate)   08/09/18 1539   Pain, Acute (Adult)   Related Risk Factors (Acute Pain) patient perception;surgery   Signs and Symptoms (Acute Pain) BADLs/IADLs reluctance/inability to perform     Goal: Acceptable Pain Control/Comfort Level  Outcome: Ongoing (interventions implemented as appropriate)      Problem: Wound (Includes Pressure Injury) (Adult)  Goal: Signs and Symptoms of Listed Potential Problems Will be Absent, Minimized or Managed (Wound)  Outcome: Ongoing (interventions implemented as appropriate)   08/09/18 1539   Goal/Outcome Evaluation   Problems Assessed (Wound) all   Problems Present (Wound) infection;pain       Problem: Skin Injury Risk (Adult)  Goal: Identify Related Risk Factors and Signs and Symptoms  Outcome: Ongoing (interventions implemented as appropriate)   08/09/18 1539   Skin Injury Risk (Adult)   Related Risk Factors (Skin Injury Risk) infection     Goal: Skin Health and Integrity  Outcome: Ongoing (interventions implemented as appropriate)      Problem: Diabetes, Type 2 (Adult)  Goal: Signs and Symptoms of Listed Potential Problems Will be Absent, Minimized or Managed (Diabetes, Type 2)  Outcome: Ongoing (interventions implemented as appropriate)   08/09/18 1539   Goal/Outcome Evaluation    Problems Assessed (Type 2 Diabetes) all   Problems Present (Type 2 Diabetes) hyperglycemia

## 2018-08-12 NOTE — PROGRESS NOTES
"  I have personally seen and examined the patient today and discussed overnight interval progress and pertinent issues with nursing staff.    Subjective:    CRP continues to improve down from 7.10-4.81 with normal white count.  Left lower extremity with improving erythema still with some edema but resolving.  Suggested patient elevate left lower extremity on 2 pillows.  Wound VAC is in place.  Patient with low-grade temp 99 overnight resolved this morning.  Duplex venous Doppler report not read as of yet.    Left ankle culture from 8/8/18 finalized as MSSA.  Repeat left ankle culture preliminary shows growth of gram-positive cocci consistent with staph species. Urine culture from 8/8/18 finalized as 50-60,000 colonies of normal urogenital kandace.    History taken from: patient chart RN      Vital Signs    /86 (BP Location: Left arm, Patient Position: Lying)   Pulse 90   Temp 98.7 °F (37.1 °C) (Oral)   Resp 16   Ht 162.6 cm (64\")   Wt 79 kg (174 lb 1.6 oz)   SpO2 96%   BMI 29.88 kg/m²     Temp:  [98.2 °F (36.8 °C)-99.4 °F (37.4 °C)] 98.7 °F (37.1 °C)      Intake/Output Summary (Last 24 hours) at 08/12/18 1032  Last data filed at 08/12/18 0400   Gross per 24 hour   Intake              940 ml   Output                0 ml   Net              940 ml     Intake & Output (last 3 days)       08/09 0701 - 08/10 0700 08/10 0701 - 08/11 0700 08/11 0701 - 08/12 0700 08/12 0701 - 08/13 0700    P.O. 1320 960 840     IV Piggyback   100     Total Intake(mL/kg) 1320 (16.7) 960 (12.2) 940 (11.9)     Net +1320 +960 +940              Unmeasured Urine Occurrence 2 x 3 x 5 x     Unmeasured Stool Occurrence 1 x  0 x           Physical exam:    General Appearance:    Alert, fever, chills and malaise    Head:    Normocephalic, without obvious abnormality, atraumatic   Eyes:            Lids and lashes normal, conjunctivae and sclerae normal, no   icterus, no pallor, corneas clear, PERRLA   Ears:    Ears appear intact with no " abnormalities noted   Throat:   No oral lesions, no thrush, oral mucosa moist   Neck:   No adenopathy, supple, trachea midline, no thyromegaly, no   carotid bruit, no JVD   Back:     No tenderness to percussion or palpation, range of motion   normal   Lungs:     Clear to auscultation,respirations regular, even and           unlabored. No wheezing, no ronchi and no crackles.    Heart:    Regular rhythm and normal rate, normal S1 and S2, no            murmur, no gallop, no rub, no click   Chest Wall:    No abnormalities observed   Abdomen:     Normal bowel sounds, no masses, no organomegaly, soft        non-tender, non-distended, no guarding, no rebound                tenderness   Rectal:     Deferred   Extremities:   Moves all extremities well, no edema, no cyanosis, no             redness   Pulses:   Pulses palpable and equal bilaterally   Skin:    wound VAC in place, less erythema and improving edema    Lymph nodes:   No palpable adenopathy   Neurologic:   Awake, alert and oriented x 3. Following commands.       Results:      Results from last 7 days  Lab Units 08/12/18  0108 08/11/18  0406 08/10/18  0218 08/09/18  0150 08/08/18  1153   WBC 10*3/mm3 9.58 10.43 11.32 18.27* 21.04*     Lab Results   Component Value Date    NEUTROABS 5.49 08/12/2018         Results from last 7 days  Lab Units 08/12/18  0108   CREATININE mg/dL 0.65         Results from last 7 days  Lab Units 08/12/18  0108 08/11/18  0406 08/10/18  0218   CRP mg/dL 4.81* 7.10* 12.45*       Imaging Results (last 24 hours)     Procedure Component Value Units Date/Time    US Venous Doppler Lower Extremity Left (duplex) [[id number]] Updated:  08/11/18 1405            Results Review:    I have personally reviewed laboratory data, culture results, radiology studies and antimicrobial therapy.    Hospital Medications (active)       Dose Frequency Start End    acetaminophen (TYLENOL) tablet 650 mg 650 mg Every 6 Hours PRN 8/9/2018     Sig - Route: Take 2 tablets  by mouth Every 6 (Six) Hours As Needed for Mild Pain  or Fever. - Oral    cefTRIAXone (ROCEPHIN) 2 g/100 mL 0.9% NS VTB (DENISHA) 2 g Every 24 Hours 8/8/2018 8/13/2018    Sig - Route: Infuse 100 mL into a venous catheter Daily. - Intravenous    dextrose (D50W) solution 25 g 25 g Every 15 Minutes PRN 8/8/2018     Sig - Route: Infuse 50 mL into a venous catheter Every 15 (Fifteen) Minutes As Needed for Low Blood Sugar (Blood Sugar Less Than 70). - Intravenous    Cosign for Ordering: Accepted by Manolo Tripathi MD on 8/8/2018  4:16 PM    dextrose (D50W) solution 25 g 25 g Every 15 Minutes PRN 8/9/2018     Sig - Route: Infuse 50 mL into a venous catheter Every 15 (Fifteen) Minutes As Needed for Low Blood Sugar (Blood Sugar Less Than 70). - Intravenous    Cosign for Ordering: Accepted by Manolo Tripathi MD on 8/9/2018 12:19 PM    dextrose (GLUTOSE) oral gel 15 g 15 g Every 15 Minutes PRN 8/8/2018     Sig - Route: Take 15 g by mouth Every 15 (Fifteen) Minutes As Needed for Low Blood Sugar (Blood sugar less than 70). - Oral    Cosign for Ordering: Accepted by Manolo Tripathi MD on 8/8/2018  4:16 PM    dextrose (GLUTOSE) oral gel 15 g 15 g Every 15 Minutes PRN 8/9/2018     Sig - Route: Take 15 g by mouth Every 15 (Fifteen) Minutes As Needed for Low Blood Sugar (Blood sugar less than 70). - Oral    Cosign for Ordering: Accepted by Manolo Tripathi MD on 8/9/2018 12:19 PM    glucagon (human recombinant) (GLUCAGEN DIAGNOSTIC) injection 1 mg 1 mg As Needed 8/8/2018     Sig - Route: Inject 1 mg under the skin into the appropriate area as directed As Needed (Blood Glucose Less Than 70). - Subcutaneous    Cosign for Ordering: Accepted by Manolo Tripathi MD on 8/8/2018  4:16 PM    glucagon (human recombinant) (GLUCAGEN DIAGNOSTIC) injection 1 mg 1 mg As Needed 8/9/2018     Sig - Route: Inject 1 mg under the skin into the appropriate area as directed As Needed (Blood Glucose Less Than 70). - Subcutaneous    Cosign for Ordering: Accepted  by Manolo Tripathi MD on 8/9/2018 12:19 PM    heparin (porcine) 5000 UNIT/ML injection 5,000 Units 5,000 Units Every 12 Hours Scheduled 8/8/2018     Sig - Route: Inject 1 mL under the skin into the appropriate area as directed Every 12 (Twelve) Hours. - Subcutaneous    Cosign for Ordering: Accepted by Manolo Tripathi MD on 8/8/2018  4:16 PM    insulin aspart (novoLOG) injection 0-9 Units 0-9 Units 4 Times Daily Before Meals & Nightly 8/9/2018     Sig - Route: Inject 0-9 Units under the skin into the appropriate area as directed 4 (Four) Times a Day Before Meals & at Bedtime. - Subcutaneous    Cosign for Ordering: Accepted by Manolo Tripathi MD on 8/9/2018 12:19 PM    insulin detemir (LEVEMIR) injection 10 Units 10 Units Nightly 8/9/2018     Sig - Route: Inject 10 Units under the skin into the appropriate area as directed Every Night. - Subcutaneous    Cosign for Ordering: Accepted by Manolo Tripathi MD on 8/9/2018 12:19 PM    lactobacillus acidophilus (RISAQUAD) capsule 1 capsule 1 capsule Daily 8/9/2018     Sig - Route: Take 1 capsule by mouth Daily. - Oral    nitroglycerin (NITROSTAT) SL tablet 0.4 mg 0.4 mg Every 5 Minutes PRN 8/8/2018     Sig - Route: Place 1 tablet under the tongue Every 5 (Five) Minutes As Needed for Chest Pain (if systolic BP greater than 100 mm/Hg.). - Sublingual    Cosign for Ordering: Accepted by Manolo Tripathi MD on 8/8/2018  4:16 PM    sodium chloride 0.9 % flush 1-10 mL 1-10 mL As Needed 8/8/2018     Sig - Route: Infuse 1-10 mL into a venous catheter As Needed for Line Care. - Intravenous    Cosign for Ordering: Accepted by Manolo Tripathi MD on 8/8/2018  4:16 PM    sodium chloride 0.9 % flush 10 mL 10 mL As Needed 8/8/2018     Sig - Route: Infuse 10 mL into a venous catheter As Needed for Line Care. - Intravenous    Cosign for Ordering: Accepted by Placido Holland MD on 8/8/2018  1:14 PM    sodium chloride 0.9 % infusion 125 mL/hr Continuous 8/8/2018     Sig - Route: Infuse 125  mL/hr into a venous catheter Continuous. - Intravenous    Cosign for Ordering: Accepted by Manolo Tripathi MD on 8/8/2018  4:16 PM    vancomycin (VANCOCIN) 1,250 mg in sodium chloride 0.9 % 250 mL IVPB 1,250 mg Every 12 Hours 8/9/2018 8/15/2018    Sig - Route: Infuse 1,250 mg into a venous catheter Every 12 (Twelve) Hours. - Intravenous    vancomycin (VANCOCIN) 1,500 mg in sodium chloride 0.9 % 500 mL IVPB 20 mg/kg × 79.4 kg Once 8/8/2018 8/8/2018    Sig - Route: Infuse 1,500 mg into a venous catheter 1 (One) Time. - Intravenous    Cosign for Ordering: Accepted by Placido Holland MD on 8/8/2018  1:14 PM    fentaNYL citrate (PF) (SUBLIMAZE) injection (Discontinued)  As Needed 8/8/2018 8/8/2018    Sig - Route: Infuse  into a venous catheter As Needed for Severe Pain . - Intravenous    Reason for Discontinue: Anesthesia Stop    lactated ringers infusion (Discontinued) 125 mL/hr Continuous 8/8/2018 8/9/2018    Sig - Route: Infuse 125 mL/hr into a venous catheter Continuous. - Intravenous    metFORMIN (GLUCOPHAGE) tablet 500 mg (Discontinued) 500 mg 2 Times Daily With Meals 8/9/2018 8/9/2018    Sig - Route: Take 1 tablet by mouth 2 (Two) Times a Day With Meals. - Oral    Cosign for Ordering: Accepted by Manolo Tripathi MD on 8/9/2018 12:19 PM    midazolam (VERSED) injection (Discontinued)  As Needed 8/8/2018 8/8/2018    Sig - Route: Infuse  into a venous catheter As Needed. - Intravenous    Reason for Discontinue: Anesthesia Stop    ondansetron (ZOFRAN) injection (Discontinued)  As Needed 8/8/2018 8/8/2018    Sig - Route: Infuse  into a venous catheter As Needed for Nausea or Vomiting. - Intravenous    Reason for Discontinue: Anesthesia Stop    Pharmacy Consult - Pharmacy to dose (Discontinued)  Continuous PRN 8/8/2018 8/8/2018    Sig - Route: Continuous As Needed for Consult. - Does not apply    Reason for Discontinue: Dose adjustment    Cosign for Ordering: Accepted by Manolo Tripathi MD on 8/8/2018  4:16 PM     Pharmacy Consult - Pharmacy to dose (Discontinued)  Continuous PRN 8/8/2018 8/8/2018    Sig - Route: Continuous As Needed for Consult. - Does not apply    Reason for Discontinue: Dose adjustment    Cosign for Ordering: Accepted by Manolo Tripathi MD on 8/8/2018  4:16 PM    piperacillin-tazobactam (ZOSYN) 3.375 g/100 mL 0.9% NS IVPB (mbp) (Discontinued) 3.375 g Every 8 Hours 8/8/2018 8/8/2018    Sig - Route: Infuse 100 mL into a venous catheter Every 8 (Eight) Hours. - Intravenous    propofol (DIPRIVAN) infusion 10 mg/mL 100 mL (Discontinued)  Continuous PRN 8/8/2018 8/8/2018    Sig - Route: Infuse  into a venous catheter Continuous As Needed. - Intravenous    Reason for Discontinue: Anesthesia Stop    Propofol (DIPRIVAN) injection (Discontinued)  As Needed 8/8/2018 8/8/2018    Sig - Route: Infuse  into a venous catheter As Needed. - Intravenous    Reason for Discontinue: Anesthesia Stop    sodium chloride 0.9 % flush 1-10 mL (Discontinued) 1-10 mL As Needed 8/8/2018 8/8/2018    Sig - Route: Infuse 1-10 mL into a venous catheter As Needed for Line Care. - Intravenous    Reason for Discontinue: Patient Transfer    sodium chloride 1,000 mL with vancomycin 1,000 mg irrigation (Discontinued)  As Needed 8/8/2018 8/8/2018    Sig: As Needed.    Reason for Discontinue: Patient Discharge    sterile water irrigation solution (Discontinued)  As Needed 8/8/2018 8/8/2018    Sig: As Needed.    Reason for Discontinue: Patient Discharge            Cultures:    Blood Culture   Date Value Ref Range Status   08/08/2018 No growth at 24 hours  Preliminary   08/08/2018 No growth at 24 hours  Preliminary     Urine Culture   Date Value Ref Range Status   08/08/2018 Normal Urogenital Kelle  Preliminary           Assessment/Plan     ASSESSMENT:    1.  Septic arthritis versus severe cellulitis  2.  Lower extremity abscess    PLAN:    CRP continues to improve down from 7.10-4.81 with normal white count.  Left lower extremity with improving  erythema still with some edema but resolving.  Suggested patient elevate left lower extremity on 2 pillows.  Wound VAC is in place.  Patient with low-grade temp 99 overnight resolved this morning.  Duplex venous Doppler report not read as of yet.    Left ankle culture from 8/8/18 finalized as MSSA.  Repeat left ankle culture preliminary shows growth of gram-positive cocci consistent with staph species. Urine culture from 8/8/18 finalized as 50-60,000 colonies of normal urogenital kandace.    Dr. Artis reported no evidence of contamination of the ankle joint, or osteomyelitis.    Blood cultures show no growth at 3 days.  Chlamydia and gonorrhea antibodies in progress.    Differential diagnosis could be gonococcal arthritis  or inflammatory reactive arthritis.      Based on finalization of left ankle wound culture antibiotic therapy was changed to high-dose Rocephin monotherapy 2 g IV every 24 hours.     Recommend to  de-escalate Rocephin to Keflex 500 mg 1 by mouth 3 times a day upon discharge.    CRP ordered for a.m.        Current antimicrobials:     Rocephin 2 g IV Q24H       Patient's findings and recommendations were discussed with patient, nursing staff, primary care team and consulting provider    Code Status:   Code Status and Medical Interventions:   Ordered at: 08/08/18 1559     Code Status:    CPR     Medical Interventions (Level of Support Prior to Arrest):    Full     Comments:    Any long term medical decisions to be made by her brother, Eric Oconnor who she reports to be her next of kin, in the event she can't decide for herself.       Sharri Patiño, RADHA  08/12/18  10:32 AM

## 2018-08-12 NOTE — PLAN OF CARE
Problem: Patient Care Overview  Goal: Plan of Care Review  Outcome: Ongoing (interventions implemented as appropriate)   08/09/18 1539 08/11/18 2100   Coping/Psychosocial   Plan of Care Reviewed With --  patient   Plan of Care Review   Progress no change --        Problem: Pain, Acute (Adult)  Goal: Identify Related Risk Factors and Signs and Symptoms  Outcome: Ongoing (interventions implemented as appropriate)    Goal: Acceptable Pain Control/Comfort Level  Outcome: Ongoing (interventions implemented as appropriate)      Problem: Wound (Includes Pressure Injury) (Adult)  Goal: Signs and Symptoms of Listed Potential Problems Will be Absent, Minimized or Managed (Wound)  Outcome: Ongoing (interventions implemented as appropriate)      Problem: Skin Injury Risk (Adult)  Goal: Identify Related Risk Factors and Signs and Symptoms  Outcome: Ongoing (interventions implemented as appropriate)    Goal: Skin Health and Integrity  Outcome: Ongoing (interventions implemented as appropriate)      Problem: Diabetes, Type 2 (Adult)  Goal: Signs and Symptoms of Listed Potential Problems Will be Absent, Minimized or Managed (Diabetes, Type 2)  Outcome: Ongoing (interventions implemented as appropriate)

## 2018-08-13 LAB
ALBUMIN SERPL-MCNC: 3 G/DL (ref 3.5–5)
ALBUMIN/GLOB SERPL: 1 G/DL (ref 1.5–2.5)
ALP SERPL-CCNC: 107 U/L (ref 35–104)
ALT SERPL W P-5'-P-CCNC: 53 U/L (ref 10–36)
ANION GAP SERPL CALCULATED.3IONS-SCNC: 4.6 MMOL/L (ref 3.6–11.2)
AST SERPL-CCNC: 39 U/L (ref 10–30)
BACTERIA SPEC AEROBE CULT: NORMAL
BACTERIA SPEC AEROBE CULT: NORMAL
BASOPHILS # BLD AUTO: 0.08 10*3/MM3 (ref 0–0.3)
BASOPHILS NFR BLD AUTO: 0.9 % (ref 0–2)
BILIRUB SERPL-MCNC: 0.2 MG/DL (ref 0.2–1.8)
BUN BLD-MCNC: 8 MG/DL (ref 7–21)
BUN/CREAT SERPL: 16.7 (ref 7–25)
CALCIUM SPEC-SCNC: 8.6 MG/DL (ref 7.7–10)
CHLAMYDIA IGG SER-ACNC: <0.91 RATIO (ref 0–0.9)
CHLORIDE SERPL-SCNC: 104 MMOL/L (ref 99–112)
CO2 SERPL-SCNC: 29.4 MMOL/L (ref 24.3–31.9)
CREAT BLD-MCNC: 0.48 MG/DL (ref 0.43–1.29)
CRP SERPL-MCNC: 3.8 MG/DL (ref 0–0.99)
DEPRECATED RDW RBC AUTO: 38.8 FL (ref 37–54)
EOSINOPHIL # BLD AUTO: 0.25 10*3/MM3 (ref 0–0.7)
EOSINOPHIL NFR BLD AUTO: 2.8 % (ref 0–5)
ERYTHROCYTE [DISTWIDTH] IN BLOOD BY AUTOMATED COUNT: 12.3 % (ref 11.5–14.5)
GFR SERPL CREATININE-BSD FRML MDRD: 147 ML/MIN/1.73
GLOBULIN UR ELPH-MCNC: 3 GM/DL
GLUCOSE BLD-MCNC: 192 MG/DL (ref 70–110)
GLUCOSE BLDC GLUCOMTR-MCNC: 148 MG/DL (ref 70–130)
GLUCOSE BLDC GLUCOMTR-MCNC: 151 MG/DL (ref 70–130)
GLUCOSE BLDC GLUCOMTR-MCNC: 177 MG/DL (ref 70–130)
GLUCOSE BLDC GLUCOMTR-MCNC: 222 MG/DL (ref 70–130)
HCT VFR BLD AUTO: 33.3 % (ref 37–47)
HGB BLD-MCNC: 11.1 G/DL (ref 12–16)
IMM GRANULOCYTES # BLD: 0.17 10*3/MM3 (ref 0–0.03)
IMM GRANULOCYTES NFR BLD: 1.9 % (ref 0–0.5)
LYMPHOCYTES # BLD AUTO: 2.94 10*3/MM3 (ref 1–3)
LYMPHOCYTES NFR BLD AUTO: 32.6 % (ref 21–51)
MCH RBC QN AUTO: 30.1 PG (ref 27–33)
MCHC RBC AUTO-ENTMCNC: 33.3 G/DL (ref 33–37)
MCV RBC AUTO: 90.2 FL (ref 80–94)
MONOCYTES # BLD AUTO: 0.6 10*3/MM3 (ref 0.1–0.9)
MONOCYTES NFR BLD AUTO: 6.7 % (ref 0–10)
NEUTROPHILS # BLD AUTO: 4.98 10*3/MM3 (ref 1.4–6.5)
NEUTROPHILS NFR BLD AUTO: 55.1 % (ref 30–70)
OSMOLALITY SERPL CALC.SUM OF ELEC: 279.2 MOSM/KG (ref 273–305)
PLATELET # BLD AUTO: 478 10*3/MM3 (ref 130–400)
PMV BLD AUTO: 8.7 FL (ref 6–10)
POTASSIUM BLD-SCNC: 3.9 MMOL/L (ref 3.5–5.3)
PROT SERPL-MCNC: 6 G/DL (ref 6–8)
RBC # BLD AUTO: 3.69 10*6/MM3 (ref 4.2–5.4)
SODIUM BLD-SCNC: 138 MMOL/L (ref 135–153)
WBC NRBC COR # BLD: 9.02 10*3/MM3 (ref 4.5–12.5)

## 2018-08-13 PROCEDURE — 86140 C-REACTIVE PROTEIN: CPT | Performed by: PHYSICIAN ASSISTANT

## 2018-08-13 PROCEDURE — 63710000001 INSULIN ASPART PER 5 UNITS: Performed by: INTERNAL MEDICINE

## 2018-08-13 PROCEDURE — 82962 GLUCOSE BLOOD TEST: CPT

## 2018-08-13 PROCEDURE — 63710000001 INSULIN ASPART PER 5 UNITS: Performed by: PHYSICIAN ASSISTANT

## 2018-08-13 PROCEDURE — 25010000002 HEPARIN (PORCINE) PER 1000 UNITS: Performed by: PHYSICIAN ASSISTANT

## 2018-08-13 PROCEDURE — 99232 SBSQ HOSP IP/OBS MODERATE 35: CPT | Performed by: INTERNAL MEDICINE

## 2018-08-13 PROCEDURE — 85025 COMPLETE CBC W/AUTO DIFF WBC: CPT | Performed by: PHYSICIAN ASSISTANT

## 2018-08-13 PROCEDURE — 80053 COMPREHEN METABOLIC PANEL: CPT | Performed by: PHYSICIAN ASSISTANT

## 2018-08-13 PROCEDURE — 25010000002 CEFTRIAXONE: Performed by: INTERNAL MEDICINE

## 2018-08-13 PROCEDURE — 63710000001 INSULIN DETEMIR PER 5 UNITS: Performed by: INTERNAL MEDICINE

## 2018-08-13 PROCEDURE — 94799 UNLISTED PULMONARY SVC/PX: CPT

## 2018-08-13 RX ADMIN — IBUPROFEN 200 MG: 200 TABLET, FILM COATED ORAL at 23:45

## 2018-08-13 RX ADMIN — Medication 1 CAPSULE: at 09:14

## 2018-08-13 RX ADMIN — HEPARIN SODIUM 5000 UNITS: 5000 INJECTION, SOLUTION INTRAVENOUS; SUBCUTANEOUS at 09:14

## 2018-08-13 RX ADMIN — INSULIN ASPART 10 UNITS: 100 INJECTION, SOLUTION INTRAVENOUS; SUBCUTANEOUS at 12:01

## 2018-08-13 RX ADMIN — IBUPROFEN 200 MG: 200 TABLET, FILM COATED ORAL at 09:14

## 2018-08-13 RX ADMIN — FAMOTIDINE 20 MG: 20 TABLET, FILM COATED ORAL at 09:14

## 2018-08-13 RX ADMIN — CEFTRIAXONE 2 G: 2 INJECTION, POWDER, FOR SOLUTION INTRAMUSCULAR; INTRAVENOUS at 17:57

## 2018-08-13 RX ADMIN — HEPARIN SODIUM 5000 UNITS: 5000 INJECTION, SOLUTION INTRAVENOUS; SUBCUTANEOUS at 19:49

## 2018-08-13 RX ADMIN — INSULIN ASPART 10 UNITS: 100 INJECTION, SOLUTION INTRAVENOUS; SUBCUTANEOUS at 17:57

## 2018-08-13 RX ADMIN — Medication 1 TABLET: at 09:14

## 2018-08-13 RX ADMIN — INSULIN ASPART 2 UNITS: 100 INJECTION, SOLUTION INTRAVENOUS; SUBCUTANEOUS at 19:49

## 2018-08-13 RX ADMIN — INSULIN DETEMIR 30 UNITS: 100 INJECTION, SOLUTION SUBCUTANEOUS at 19:50

## 2018-08-13 RX ADMIN — FERROUS SULFATE TAB 325 MG (65 MG ELEMENTAL FE) 325 MG: 325 (65 FE) TAB at 09:14

## 2018-08-13 RX ADMIN — INSULIN ASPART 4 UNITS: 100 INJECTION, SOLUTION INTRAVENOUS; SUBCUTANEOUS at 12:00

## 2018-08-13 NOTE — PROGRESS NOTES
Discharge Planning Assessment   [place]     Patient Name: Cynthia Ríos  MRN: [id number]  Today's Date: 8/13/2018    Admit Date: 8/8/2018      Discharge Plan     Row Name 08/13/18 2070       Plan    Plan SS spoke to wound care nurse, Yamilet who states wound vac will be arranged for home on this date with [facility]. SS spoke to pt. Pt lives alone and plans to return home at discharge. Pt is agreeable to come to [facility] infusion clinic on M-W-F's for wound vac dressing changes. Pt will need an order for [facility] infusion clinic for wound vac dressing changes and lead nurse will make arrangements. Pt does not have DME or home health services. SS to follow and assist as needed with discharge planning.           Nga Silvestre

## 2018-08-13 NOTE — PROGRESS NOTES
[facility] HOSPITALIST PROGRESS NOTE     Patient Identification:  Name:  Cynthia Ríos  Age:  33 y.o.  Sex:  female  :  1985  MRN:  [id number]  Visit Number:  [id number]  Primary Care Provider:  Provider, No Known    Length of stay:  5    Chief complaint:  33 y.o. old female admitted with Abscess          Subjective:    No acute issues overnight.  Patient states that she's feeling much better today.  She is complaining of some nausea but denies any vomiting.  She states that she has had significant improvement since admission.  Denies any other complaints.         Current Hospital Meds:    ceftriaxone 2 g Intravenous Q24H   famotidine 20 mg Oral Daily   ferrous sulfate 325 mg Oral Daily With Breakfast   heparin (porcine) 5,000 Units Subcutaneous Q12H   insulin aspart 0-9 Units Subcutaneous 4x Daily AC & at Bedtime   insulin aspart 10 Units Subcutaneous TID With Meals   insulin detemir 25 Units Subcutaneous Nightly   lactobacillus acidophilus 1 capsule Oral Daily   multivitamin 1 tablet Oral Daily        ----------------------------------------------------------------------------------------------------------------------  Vital Signs:  Temp:  [98.3 °F (36.8 °C)-98.9 °F (37.2 °C)] 98.9 °F (37.2 °C)  Heart Rate:  [81-88] 88  Resp:  [16-20] 20  BP: (138-153)/(79-97) 138/90  1    18  1044 18  1425   Weight: 79.4 kg (175 lb) 79 kg (174 lb 1.6 oz)     Body mass index is 29.88 kg/m².    Intake/Output Summary (Last 24 hours) at 18 0812  Last data filed at 18 0300   Gross per 24 hour   Intake              820 ml   Output                0 ml   Net              820 ml     Diet Regular; Consistent Carbohydrate  ----------------------------------------------------------------------------------------------------------------------  Physical exam:  Constitutional:  Well-developed and well-nourished.     HENT:  Head:  Normocephalic and atraumatic.  Mouth:  Moist mucous membranes.     Eyes:  Conjunctivae and EOM are normal.  Pupils are equal, round, and reactive to light.   Neck:  Neck supple.  No JVD present.    Cardiovascular:  Regular rate and rhythm. S1+S2. No murmur, rubs or gallops.   Pulmonary/Chest: Clear to auscultation bilaterally.   Abdominal:  Soft. Non-tender. No viscera palpable.  Bowel sounds audible.   Musculoskeletal: Left ankle wound with wound VAC in place.  No surrounding erythema.  Swelling on left foot with no significant erythema or tenderness to palpation.  Peripheral vascular: Bilateral dorsalis pedis palpable. No edema.   Neurological:  Alert and oriented to person, place, and time.  Cranial nerves grossly intact. Strength bilaterally symmetrical in upper and lower extremities.   Skin:  Skin is warm and dry. No rash noted. No pallor.   ----------------------------------------------------------------------------------------------------------------------  Tele:  Pt not on telemetry  ----------------------------------------------------------------------------------------------------------------------        Results from last 7 days  Lab Units 08/13/18  0111 08/12/18  0108 08/11/18  0406  08/08/18  1153   CRP mg/dL 3.80* 4.81* 7.10*  < > 19.24*   LACTATE mmol/L  --   --   --   --  1.2   WBC 10*3/mm3 9.02 9.58 10.43  < > 21.04*   HEMOGLOBIN g/dL 11.1* 11.0* 11.4*  < > 13.3   HEMATOCRIT % 33.3* 32.6* 33.8*  < > 38.9   MCV fL 90.2 89.1 88.9  < > 88.2   MCHC g/dL 33.3 33.7 33.7  < > 34.2   PLATELETS 10*3/mm3 478* 496* 469*  < > 440*   < > = values in this interval not displayed.        Results from last 7 days  Lab Units 08/13/18  0111 08/12/18  1904 08/12/18  0108 08/11/18  0406  08/08/18  1153   SODIUM mmol/L 138  --  140 142  < > 135   POTASSIUM mmol/L 3.9 4.1 3.6 3.9  < > 3.9   MAGNESIUM mg/dL  --   --   --   --   --  2.2   CHLORIDE mmol/L 104  --  106 108  < > 102   CO2 mmol/L 29.4  --  26.9 26.9  < > 22.3*   BUN mg/dL 8  --  7 10  < > 9   CREATININE mg/dL 0.48  --  0.65  0.48  < > 0.64   EGFR IF NONAFRICN AM mL/min/1.73 147  --  104 147  < > 106   CALCIUM mg/dL 8.6  --  8.2 8.5  < > 9.0   GLUCOSE mg/dL 192*  --  196* 170*  < > 275*   ALBUMIN g/dL 3.00*  --  3.00* 3.20*  < > 4.10   BILIRUBIN mg/dL 0.2  --  0.2 0.2  < > 0.6   ALK PHOS U/L 107*  --  117* 112*  < > 110*   AST (SGOT) U/L 39*  --  48* 49*  < > 16   ALT (SGPT) U/L 53*  --  47* 63*  < > 16   < > = values in this interval not displayed.Estimated Creatinine Clearance: 166.3 mL/min (by C-G formula based on SCr of 0.48 mg/dL).    No results found for: AMMONIA      Blood Culture   Date Value Ref Range Status   08/08/2018 No growth at 4 days  Preliminary   08/08/2018 No growth at 4 days  Preliminary     Urine Culture   Date Value Ref Range Status   08/08/2018 50,000-60,000 CFU/mL Normal Urogenital Kelle  Final     Wound Culture   Date Value Ref Range Status   08/08/2018 Heavy growth (4+) Staphylococcus aureus (A)  Final          I have personally looked at the labs and they are summarized above.  ----------------------------------------------------------------------------------------------------------------------  Imaging Results (last 24 hours)     Procedure Component Value Units Date/Time    US Venous Doppler Lower Extremity Left (duplex) [[id number]] Collected:  08/12/18 1052     Updated:  08/12/18 1054    Narrative:       EXAMINATION: US VENOUS DOPPLER LOWER EXTREMITY LEFT (DUPLEX)-      CLINICAL INDICATION:  LLE edema; A41.9-Sepsis, unspecified organism;  L03.116-Cellulitis of left lower limb; R73.9-Hyperglycemia, unspecified     TECHNIQUE: Multiplanar gray scale and Doppler vascular sonographic  imaging of the deep veins of LEFT lower extremity, without and with  compression.      COMPARISON: NONE      FINDINGS:   The visualized deep veins demonstrate flow and are compressible. No  evidence of deep venous thrombosis.   Mild edema noted.       Impression:       No DVT. Soft tissue edema noted.     This report was finalized on  8/12/2018 10:52 AM by Dr. Erlin Chong MD.           ----------------------------------------------------------------------------------------------------------------------  Assessment and Plan:    Cellulitis and abscess due to MSSA  S/p I&D, excisional debridement of skin and soft tissue.  Currently on IV Rocephin and will continue per ID recommendations.  Orthopedic surgery following and planning on changing wound VAC today.  WBC is normal and CRP is improving.  No growth on blood cultures so far.    Diabetes mellitus  Newly diagnosed.  A1c is 10.  Blood sugar is running around 200.  Increase Levemir to 30 units at bedtime and continue NovoLog with meals and sliding scale insulin.  Monitor blood sugar with Accu-Cheks.    Elevated transaminases  Hepatitis panel is negative.  LFTs improving.    Disposition  Waiting for evaluation from orthopedics and will need recommendations from infectious diseases regarding choice of antibiotics and length of therapy.      Prophylaxis  DVT: Heparin    The patient is high risk due to: Cellulitis and abscess, uncontrolled diabetes mellitus    I discussed the patients findings and my recommendations with patient and nursing staff.    Chato Levi MD  08/13/18  8:12 AM

## 2018-08-13 NOTE — NURSING NOTE
Information faxed to [facility] for home NPWT VAC with changes at infusion clinic.

## 2018-08-13 NOTE — NURSING NOTE
NPWT VAC dressing changed.  Discoloration to wound edges no longer noted.  Patient tolerated well.       08/13/18 1145   Wound 08/08/18 1445 Left other (see comments) ankle abscess   Date first assessed/Time first assessed: 08/08/18 1445   Present On Admission : yes  Side: Left  Orientation: (c) other (see comments)  Location: ankle  Type: abscess   Base red/granulating   Periwound intact;redness   Periwound Temperature warm   Periwound Skin Turgor soft   Drainage Characteristics/Odor serosanguineous   Drainage Amount small   NPWT (Negative Pressure Wound Therapy) 08/08/18 Left lateral ankle   Placement Date: 08/08/18   Location: Left lateral ankle   Therapy Setting continuous therapy   Dressing foam, black   Pressure Setting 125 mmHg   Sponges Inserted 3   Sponges Removed 2   Finger sweep complete Yes

## 2018-08-13 NOTE — PROGRESS NOTES
"  I have personally seen and examined the patient today and discussed overnight interval progress and pertinent issues with nursing staff.    Subjective:    The patient seems to be doing well this morning with some pain to the left lower extremity.  Wound VAC in place and scheduled to be changed today.  CRP is showing some improvement from 4.81 down to 3.80 with normal white count.    History taken from: patient chart RN      Vital Signs    /90 (BP Location: Left arm, Patient Position: Lying)   Pulse 88   Temp 98.9 °F (37.2 °C) (Oral)   Resp 20   Ht 162.6 cm (64\")   Wt 79 kg (174 lb 1.6 oz)   SpO2 92%   BMI 29.88 kg/m²     Temp:  [98.3 °F (36.8 °C)-98.9 °F (37.2 °C)] 98.9 °F (37.2 °C)      Intake/Output Summary (Last 24 hours) at 08/13/18 1012  Last data filed at 08/13/18 0300   Gross per 24 hour   Intake              820 ml   Output                0 ml   Net              820 ml     Intake & Output (last 3 days)       08/10 0701 - 08/11 0700 08/11 0701 - 08/12 0700 08/12 0701 - 08/13 0700 08/13 0701 - 08/14 0700    P.O.      IV Piggyback  100 100     Total Intake(mL/kg) 960 (12.2) 940 (11.9) 1180 (14.9)     Net +960 +940 +1180              Unmeasured Urine Occurrence 3 x 5 x 4 x     Unmeasured Stool Occurrence  0 x 0 x           Physical exam:    General Appearance:    Alert, fever, chills and malaise    Head:    Normocephalic, without obvious abnormality, atraumatic   Eyes:            Lids and lashes normal, conjunctivae and sclerae normal, no   icterus, no pallor, corneas clear, PERRLA   Ears:    Ears appear intact with no abnormalities noted   Throat:   No oral lesions, no thrush, oral mucosa moist   Neck:   No adenopathy, supple, trachea midline, no thyromegaly, no   carotid bruit, no JVD   Back:     No tenderness to percussion or palpation, range of motion   normal   Lungs:     Clear to auscultation,respirations regular, even and           unlabored. No wheezing, no ronchi and no " crackles.    Heart:    Regular rhythm and normal rate, normal S1 and S2, no            murmur, no gallop, no rub, no click   Chest Wall:    No abnormalities observed   Abdomen:     Normal bowel sounds, no masses, no organomegaly, soft        non-tender, non-distended, no guarding, no rebound                tenderness   Rectal:     Deferred   Extremities:   Moves all extremities well, no edema, no cyanosis, no             redness   Pulses:   Pulses palpable and equal bilaterally   Skin:    wound VAC in place, less erythema and improving edema    Lymph nodes:   No palpable adenopathy   Neurologic:   Awake, alert and oriented x 3. Following commands.       Results:      Results from last 7 days  Lab Units 08/13/18  0111 08/12/18  0108 08/11/18  0406 08/10/18  0218 08/09/18  0150 08/08/18  1153   WBC 10*3/mm3 9.02 9.58 10.43 11.32 18.27* 21.04*     Lab Results   Component Value Date    NEUTROABS 4.98 08/13/2018         Results from last 7 days  Lab Units 08/13/18  0111   CREATININE mg/dL 0.48         Results from last 7 days  Lab Units 08/13/18  0111 08/12/18  0108 08/11/18  0406   CRP mg/dL 3.80* 4.81* 7.10*       Imaging Results (last 24 hours)     Procedure Component Value Units Date/Time    US Venous Doppler Lower Extremity Left (duplex) [[id number]] Collected:  08/12/18 1052     Updated:  08/12/18 1054    Narrative:       EXAMINATION: US VENOUS DOPPLER LOWER EXTREMITY LEFT (DUPLEX)-      CLINICAL INDICATION:  LLE edema; A41.9-Sepsis, unspecified organism;  L03.116-Cellulitis of left lower limb; R73.9-Hyperglycemia, unspecified     TECHNIQUE: Multiplanar gray scale and Doppler vascular sonographic  imaging of the deep veins of LEFT lower extremity, without and with  compression.      COMPARISON: NONE      FINDINGS:   The visualized deep veins demonstrate flow and are compressible. No  evidence of deep venous thrombosis.   Mild edema noted.       Impression:       No DVT. Soft tissue edema noted.     This report was  finalized on 8/12/2018 10:52 AM by Dr. Erlin Chong MD.               Results Review:    I have personally reviewed laboratory data, culture results, radiology studies and antimicrobial therapy.    Hospital Medications (active)       Dose Frequency Start End    acetaminophen (TYLENOL) tablet 650 mg 650 mg Every 6 Hours PRN 8/9/2018     Sig - Route: Take 2 tablets by mouth Every 6 (Six) Hours As Needed for Mild Pain  or Fever. - Oral    cefTRIAXone (ROCEPHIN) 2 g/100 mL 0.9% NS VTB (DENISHA) 2 g Every 24 Hours 8/8/2018 8/13/2018    Sig - Route: Infuse 100 mL into a venous catheter Daily. - Intravenous    dextrose (D50W) solution 25 g 25 g Every 15 Minutes PRN 8/8/2018     Sig - Route: Infuse 50 mL into a venous catheter Every 15 (Fifteen) Minutes As Needed for Low Blood Sugar (Blood Sugar Less Than 70). - Intravenous    Cosign for Ordering: Accepted by Manolo Tripathi MD on 8/8/2018  4:16 PM    dextrose (D50W) solution 25 g 25 g Every 15 Minutes PRN 8/9/2018     Sig - Route: Infuse 50 mL into a venous catheter Every 15 (Fifteen) Minutes As Needed for Low Blood Sugar (Blood Sugar Less Than 70). - Intravenous    Cosign for Ordering: Accepted by Manolo Tripathi MD on 8/9/2018 12:19 PM    dextrose (GLUTOSE) oral gel 15 g 15 g Every 15 Minutes PRN 8/8/2018     Sig - Route: Take 15 g by mouth Every 15 (Fifteen) Minutes As Needed for Low Blood Sugar (Blood sugar less than 70). - Oral    Cosign for Ordering: Accepted by Manolo Tripathi MD on 8/8/2018  4:16 PM    dextrose (GLUTOSE) oral gel 15 g 15 g Every 15 Minutes PRN 8/9/2018     Sig - Route: Take 15 g by mouth Every 15 (Fifteen) Minutes As Needed for Low Blood Sugar (Blood sugar less than 70). - Oral    Cosign for Ordering: Accepted by Manolo Tripathi MD on 8/9/2018 12:19 PM    glucagon (human recombinant) (GLUCAGEN DIAGNOSTIC) injection 1 mg 1 mg As Needed 8/8/2018     Sig - Route: Inject 1 mg under the skin into the appropriate area as directed As Needed (Blood Glucose  Less Than 70). - Subcutaneous    Cosign for Ordering: Accepted by Manolo Tripathi MD on 8/8/2018  4:16 PM    glucagon (human recombinant) (GLUCAGEN DIAGNOSTIC) injection 1 mg 1 mg As Needed 8/9/2018     Sig - Route: Inject 1 mg under the skin into the appropriate area as directed As Needed (Blood Glucose Less Than 70). - Subcutaneous    Cosign for Ordering: Accepted by Manolo Tripathi MD on 8/9/2018 12:19 PM    heparin (porcine) 5000 UNIT/ML injection 5,000 Units 5,000 Units Every 12 Hours Scheduled 8/8/2018     Sig - Route: Inject 1 mL under the skin into the appropriate area as directed Every 12 (Twelve) Hours. - Subcutaneous    Cosign for Ordering: Accepted by Manolo Tripathi MD on 8/8/2018  4:16 PM    insulin aspart (novoLOG) injection 0-9 Units 0-9 Units 4 Times Daily Before Meals & Nightly 8/9/2018     Sig - Route: Inject 0-9 Units under the skin into the appropriate area as directed 4 (Four) Times a Day Before Meals & at Bedtime. - Subcutaneous    Cosign for Ordering: Accepted by Manolo Tripathi MD on 8/9/2018 12:19 PM    insulin detemir (LEVEMIR) injection 10 Units 10 Units Nightly 8/9/2018     Sig - Route: Inject 10 Units under the skin into the appropriate area as directed Every Night. - Subcutaneous    Cosign for Ordering: Accepted by Manolo Trpiathi MD on 8/9/2018 12:19 PM    lactobacillus acidophilus (RISAQUAD) capsule 1 capsule 1 capsule Daily 8/9/2018     Sig - Route: Take 1 capsule by mouth Daily. - Oral    nitroglycerin (NITROSTAT) SL tablet 0.4 mg 0.4 mg Every 5 Minutes PRN 8/8/2018     Sig - Route: Place 1 tablet under the tongue Every 5 (Five) Minutes As Needed for Chest Pain (if systolic BP greater than 100 mm/Hg.). - Sublingual    Cosign for Ordering: Accepted by Manolo Tripathi MD on 8/8/2018  4:16 PM    sodium chloride 0.9 % flush 1-10 mL 1-10 mL As Needed 8/8/2018     Sig - Route: Infuse 1-10 mL into a venous catheter As Needed for Line Care. - Intravenous    Cosign for Ordering: Accepted by  Manolo Tripathi MD on 8/8/2018  4:16 PM    sodium chloride 0.9 % flush 10 mL 10 mL As Needed 8/8/2018     Sig - Route: Infuse 10 mL into a venous catheter As Needed for Line Care. - Intravenous    Cosign for Ordering: Accepted by Placido Holland MD on 8/8/2018  1:14 PM    sodium chloride 0.9 % infusion 125 mL/hr Continuous 8/8/2018     Sig - Route: Infuse 125 mL/hr into a venous catheter Continuous. - Intravenous    Cosign for Ordering: Accepted by Manolo Tripathi MD on 8/8/2018  4:16 PM    vancomycin (VANCOCIN) 1,250 mg in sodium chloride 0.9 % 250 mL IVPB 1,250 mg Every 12 Hours 8/9/2018 8/15/2018    Sig - Route: Infuse 1,250 mg into a venous catheter Every 12 (Twelve) Hours. - Intravenous    vancomycin (VANCOCIN) 1,500 mg in sodium chloride 0.9 % 500 mL IVPB 20 mg/kg × 79.4 kg Once 8/8/2018 8/8/2018    Sig - Route: Infuse 1,500 mg into a venous catheter 1 (One) Time. - Intravenous    Cosign for Ordering: Accepted by Placido Holland MD on 8/8/2018  1:14 PM    fentaNYL citrate (PF) (SUBLIMAZE) injection (Discontinued)  As Needed 8/8/2018 8/8/2018    Sig - Route: Infuse  into a venous catheter As Needed for Severe Pain . - Intravenous    Reason for Discontinue: Anesthesia Stop    lactated ringers infusion (Discontinued) 125 mL/hr Continuous 8/8/2018 8/9/2018    Sig - Route: Infuse 125 mL/hr into a venous catheter Continuous. - Intravenous    metFORMIN (GLUCOPHAGE) tablet 500 mg (Discontinued) 500 mg 2 Times Daily With Meals 8/9/2018 8/9/2018    Sig - Route: Take 1 tablet by mouth 2 (Two) Times a Day With Meals. - Oral    Cosign for Ordering: Accepted by Manolo Tripathi MD on 8/9/2018 12:19 PM    midazolam (VERSED) injection (Discontinued)  As Needed 8/8/2018 8/8/2018    Sig - Route: Infuse  into a venous catheter As Needed. - Intravenous    Reason for Discontinue: Anesthesia Stop    ondansetron (ZOFRAN) injection (Discontinued)  As Needed 8/8/2018 8/8/2018    Sig - Route: Infuse  into a venous catheter As  Needed for Nausea or Vomiting. - Intravenous    Reason for Discontinue: Anesthesia Stop    Pharmacy Consult - Pharmacy to dose (Discontinued)  Continuous PRN 8/8/2018 8/8/2018    Sig - Route: Continuous As Needed for Consult. - Does not apply    Reason for Discontinue: Dose adjustment    Cosign for Ordering: Accepted by Manolo Tripathi MD on 8/8/2018  4:16 PM    Pharmacy Consult - Pharmacy to dose (Discontinued)  Continuous PRN 8/8/2018 8/8/2018    Sig - Route: Continuous As Needed for Consult. - Does not apply    Reason for Discontinue: Dose adjustment    Cosign for Ordering: Accepted by Manolo Tripathi MD on 8/8/2018  4:16 PM    piperacillin-tazobactam (ZOSYN) 3.375 g/100 mL 0.9% NS IVPB (mbp) (Discontinued) 3.375 g Every 8 Hours 8/8/2018 8/8/2018    Sig - Route: Infuse 100 mL into a venous catheter Every 8 (Eight) Hours. - Intravenous    propofol (DIPRIVAN) infusion 10 mg/mL 100 mL (Discontinued)  Continuous PRN 8/8/2018 8/8/2018    Sig - Route: Infuse  into a venous catheter Continuous As Needed. - Intravenous    Reason for Discontinue: Anesthesia Stop    Propofol (DIPRIVAN) injection (Discontinued)  As Needed 8/8/2018 8/8/2018    Sig - Route: Infuse  into a venous catheter As Needed. - Intravenous    Reason for Discontinue: Anesthesia Stop    sodium chloride 0.9 % flush 1-10 mL (Discontinued) 1-10 mL As Needed 8/8/2018 8/8/2018    Sig - Route: Infuse 1-10 mL into a venous catheter As Needed for Line Care. - Intravenous    Reason for Discontinue: Patient Transfer    sodium chloride 1,000 mL with vancomycin 1,000 mg irrigation (Discontinued)  As Needed 8/8/2018 8/8/2018    Sig: As Needed.    Reason for Discontinue: Patient Discharge    sterile water irrigation solution (Discontinued)  As Needed 8/8/2018 8/8/2018    Sig: As Needed.    Reason for Discontinue: Patient Discharge            Cultures:    Blood Culture   Date Value Ref Range Status   08/08/2018 No growth at 24 hours  Preliminary   08/08/2018 No growth  at 24 hours  Preliminary     Urine Culture   Date Value Ref Range Status   08/08/2018 Normal Urogenital Kandace  Preliminary           Assessment/Plan     ASSESSMENT:    1.  Septic arthritis versus severe cellulitis  2.  Lower extremity abscess    PLAN:    The patient seems to be doing well this morning with some pain to the left lower extremity.  Wound VAC in place and scheduled to be changed today.  CRP is showing some improvement from 4.81 down to 3.80 with normal white count.    Left ankle culture from 8/8/18 finalized as MSSA.  Repeat left ankle culture preliminary shows growth of gram-positive cocci consistent with staph species. Urine culture from 8/8/18 finalized as 50-60,000 colonies of normal urogenital kandace.    Dr. Artis reported no evidence of contamination of the ankle joint, or osteomyelitis.    Blood cultures show no growth.      Differential diagnosis could be gonococcal arthritis  or inflammatory reactive arthritis.      Based on finalization of left ankle wound culture antibiotic therapy was changed to high-dose Rocephin monotherapy 2 g IV every 24 hours.     Recommend to  de-escalate Rocephin to Keflex 500 mg 1 by mouth 3 times a day upon discharge through 8/22/18.    CRP ordered for a.m.        Current antimicrobials:     Rocephin 2 g IV Q24H       Patient's findings and recommendations were discussed with patient, nursing staff, primary care team and consulting provider    Code Status:   Code Status and Medical Interventions:   Ordered at: 08/08/18 1559     Code Status:    CPR     Medical Interventions (Level of Support Prior to Arrest):    Full     Comments:    Any long term medical decisions to be made by her brother, Eric Oconnor who she reports to be her next of kin, in the event she can't decide for herself.       Piedad Lugo PA-C  08/13/18  10:12 AM     Physician Attestation:    I have personally seen and examined the patient. I reviewed the patient's data including history of  present illness, review of systems, physical examination, assessment and treatment plan and agree with findings above. The assessment and plan are my own.  I have reviewed and edited the note above after discussing the findings with the midlevel.    Guzman Cervantes MD  Infectious Diseases  08/14/18  4:25 AM

## 2018-08-13 NOTE — PLAN OF CARE
Problem: Patient Care Overview  Goal: Plan of Care Review  Outcome: Ongoing (interventions implemented as appropriate)   08/08/18 1953 08/09/18 1539 08/12/18 2005   Coping/Psychosocial   Plan of Care Reviewed With --  --  patient   Plan of Care Review   Progress --  no change --    OTHER   Outcome Summary pacu care complete --  --      Goal: Individualization and Mutuality  Outcome: Ongoing (interventions implemented as appropriate)   08/08/18 1903   Mutuality/Individual Preferences   What Information Would Help Us Give You More Personalized Care? none   Individualization   Patient Specific Preferences none       Problem: Pain, Acute (Adult)  Goal: Identify Related Risk Factors and Signs and Symptoms  Outcome: Outcome(s) achieved Date Met: 08/13/18 08/09/18 1539   Pain, Acute (Adult)   Related Risk Factors (Acute Pain) patient perception;surgery   Signs and Symptoms (Acute Pain) BADLs/IADLs reluctance/inability to perform     Goal: Acceptable Pain Control/Comfort Level  Outcome: Ongoing (interventions implemented as appropriate)   08/12/18 1045   Pain, Acute (Adult)   Acceptable Pain Control/Comfort Level making progress toward outcome       Problem: Wound (Includes Pressure Injury) (Adult)  Goal: Signs and Symptoms of Listed Potential Problems Will be Absent, Minimized or Managed (Wound)  Outcome: Ongoing (interventions implemented as appropriate)   08/09/18 1539   Goal/Outcome Evaluation   Problems Assessed (Wound) all   Problems Present (Wound) infection;pain       Problem: Skin Injury Risk (Adult)  Goal: Identify Related Risk Factors and Signs and Symptoms  Outcome: Outcome(s) achieved Date Met: 08/13/18 08/09/18 1539   Skin Injury Risk (Adult)   Related Risk Factors (Skin Injury Risk) infection     Goal: Skin Health and Integrity  Outcome: Ongoing (interventions implemented as appropriate)   08/12/18 1033   Skin Injury Risk (Adult)   Skin Health and Integrity making progress toward outcome       Problem:  Diabetes, Type 2 (Adult)  Goal: Signs and Symptoms of Listed Potential Problems Will be Absent, Minimized or Managed (Diabetes, Type 2)  Outcome: Ongoing (interventions implemented as appropriate)   08/09/18 0494   Goal/Outcome Evaluation   Problems Assessed (Type 2 Diabetes) all   Problems Present (Type 2 Diabetes) hyperglycemia

## 2018-08-13 NOTE — PLAN OF CARE
Problem: Patient Care Overview  Goal: Plan of Care Review  Outcome: Ongoing (interventions implemented as appropriate)   08/13/18 0957   Coping/Psychosocial   Plan of Care Reviewed With patient   Plan of Care Review   Progress no change   OTHER   Outcome Summary patient is anxious to go home. no pain in leg/wound area per patient reported.        Problem: Pain, Acute (Adult)  Goal: Acceptable Pain Control/Comfort Level  Outcome: Ongoing (interventions implemented as appropriate)      Problem: Wound (Includes Pressure Injury) (Adult)  Goal: Signs and Symptoms of Listed Potential Problems Will be Absent, Minimized or Managed (Wound)  Outcome: Ongoing (interventions implemented as appropriate)      Problem: Skin Injury Risk (Adult)  Goal: Skin Health and Integrity  Outcome: Ongoing (interventions implemented as appropriate)      Problem: Diabetes, Type 2 (Adult)  Goal: Signs and Symptoms of Listed Potential Problems Will be Absent, Minimized or Managed (Diabetes, Type 2)  Outcome: Ongoing (interventions implemented as appropriate)

## 2018-08-13 NOTE — PAYOR COMM NOTE
"  Contact: La [place] Toro RN @ [facility]  Phone: [phone]  Fax: [phone]      Auth# [id number]  Clinical update    Cynthia Rizo  (33 y.o. Female)     Date of Birth Social Security Number Address [place] Phone MRN    1985  [address] [facility] [address] [phone] [id number]    Advent Marital Status          None Single       Admission Date Admission Type Admitting Provider Attending Provider Department, Room/Bed    8/8/18 Emergency Manolo Tripathi MD Khan, Hafiz Sarfraz, MD [address], [address]    Discharge Date Discharge Disposition Discharge Destination                       Attending Provider:  Chato Levi MD    Allergies:  No Known Allergies    Isolation:  None   Infection:  None   Code Status:  CPR    Ht:  162.6 cm (64\")   Wt:  79 kg (174 lb 1.6 oz)    Admission Cmt:  None   Principal Problem:  Cellulitis of left ankle [L03.116] More...                 Active Insurance as of 8/8/2018     Primary Coverage     Payor Plan Insurance Group Employer/Plan Group    [employer] [id number]     Payor Plan Address Payor Plan Phone Number Effective From Effective To    [address] [phone] 6/30/2017     [facility] [id number]       Subscriber Name Subscriber Birth Date Member ID       CYNTHIA RIZO 1985 [id number]                 Emergency Contacts      (Rel.) Home Phone Work Phone Mobile Phone    Eric Oconnor (Brother) [phone] -- --    Dana Oconnor (Other) [phone] -- --               Physician Progress Notes (last 24 hours) (Notes from 8/12/2018  1:21 PM through 8/13/2018  1:21 PM)      Piedad Lugo PA-C at 8/13/2018 10:12 AM            I have personally seen and examined the patient today and discussed overnight interval progress and pertinent issues with nursing staff.    Subjective:    The patient seems to be doing well this morning with some pain to the left lower extremity.  Wound VAC in " "place and scheduled to be changed today.  CRP is showing some improvement from 4.81 down to 3.80 with normal white count.    History taken from: patient chart RN      Vital Signs    /90 (BP Location: Left arm, Patient Position: Lying)   Pulse 88   Temp 98.9 °F (37.2 °C) (Oral)   Resp 20   Ht 162.6 cm (64\")   Wt 79 kg (174 lb 1.6 oz)   SpO2 92%   BMI 29.88 kg/m²      Temp:  [98.3 °F (36.8 °C)-98.9 °F (37.2 °C)] 98.9 °F (37.2 °C)      Intake/Output Summary (Last 24 hours) at 08/13/18 1012  Last data filed at 08/13/18 0300   Gross per 24 hour   Intake              820 ml   Output                0 ml   Net              820 ml     Intake & Output (last 3 days)       08/10 0701 - 08/11 0700 08/11 0701 - 08/12 0700 08/12 0701 - 08/13 0700 08/13 0701 - 08/14 0700    P.O.      IV Piggyback  100 100     Total Intake(mL/kg) 960 (12.2) 940 (11.9) 1180 (14.9)     Net +960 +940 +1180              Unmeasured Urine Occurrence 3 x 5 x 4 x     Unmeasured Stool Occurrence  0 x 0 x           Physical exam:    General Appearance:    Alert, fever, chills and malaise    Head:    Normocephalic, without obvious abnormality, atraumatic   Eyes:            Lids and lashes normal, conjunctivae and sclerae normal, no   icterus, no pallor, corneas clear, PERRLA   Ears:    Ears appear intact with no abnormalities noted   Throat:   No oral lesions, no thrush, oral mucosa moist   Neck:   No adenopathy, supple, trachea midline, no thyromegaly, no   carotid bruit, no JVD   Back:     No tenderness to percussion or palpation, range of motion   normal   Lungs:     Clear to auscultation,respirations regular, even and           unlabored. No wheezing, no ronchi and no crackles.    Heart:    Regular rhythm and normal rate, normal S1 and S2, no            murmur, no gallop, no rub, no click   Chest Wall:    No abnormalities observed   Abdomen:     Normal bowel sounds, no masses, no organomegaly, soft        non-tender, non-distended, " no guarding, no rebound                tenderness   Rectal:     Deferred   Extremities:   Moves all extremities well, no edema, no cyanosis, no             redness   Pulses:   Pulses palpable and equal bilaterally   Skin:    wound VAC in place, less erythema and improving edema    Lymph nodes:   No palpable adenopathy   Neurologic:   Awake, alert and oriented x 3. Following commands.       Results:      Results from last 7 days  Lab Units 08/13/18  0111 08/12/18  0108 08/11/18  0406 08/10/18  0218 08/09/18  0150 08/08/18  1153   WBC 10*3/mm3 9.02 9.58 10.43 11.32 18.27* 21.04*     Lab Results   Component Value Date    NEUTROABS 4.98 08/13/2018         Results from last 7 days  Lab Units 08/13/18  0111   CREATININE mg/dL 0.48         Results from last 7 days  Lab Units 08/13/18  0111 08/12/18  0108 08/11/18  0406   CRP mg/dL 3.80* 4.81* 7.10*       Imaging Results (last 24 hours)     Procedure Component Value Units Date/Time    US Venous Doppler Lower Extremity Left (duplex) [[id number]] Collected:  08/12/18 1052     Updated:  08/12/18 1054    Narrative:       EXAMINATION: US VENOUS DOPPLER LOWER EXTREMITY LEFT (DUPLEX)-      CLINICAL INDICATION:  LLE edema; A41.9-Sepsis, unspecified organism;  L03.116-Cellulitis of left lower limb; R73.9-Hyperglycemia, unspecified     TECHNIQUE: Multiplanar gray scale and Doppler vascular sonographic  imaging of the deep veins of LEFT lower extremity, without and with  compression.      COMPARISON: NONE      FINDINGS:   The visualized deep veins demonstrate flow and are compressible. No  evidence of deep venous thrombosis.   Mild edema noted.       Impression:       No DVT. Soft tissue edema noted.     This report was finalized on 8/12/2018 10:52 AM by Dr. Erlin Chong MD.               Results Review:    I have personally reviewed laboratory data, culture results, radiology studies and antimicrobial therapy.    Hospital Medications (active)       Dose Frequency Start End     acetaminophen (TYLENOL) tablet 650 mg 650 mg Every 6 Hours PRN 8/9/2018     Sig - Route: Take 2 tablets by mouth Every 6 (Six) Hours As Needed for Mild Pain  or Fever. - Oral    cefTRIAXone (ROCEPHIN) 2 g/100 mL 0.9% NS VTB (DENISHA) 2 g Every 24 Hours 8/8/2018 8/13/2018    Sig - Route: Infuse 100 mL into a venous catheter Daily. - Intravenous    dextrose (D50W) solution 25 g 25 g Every 15 Minutes PRN 8/8/2018     Sig - Route: Infuse 50 mL into a venous catheter Every 15 (Fifteen) Minutes As Needed for Low Blood Sugar (Blood Sugar Less Than 70). - Intravenous    Cosign for Ordering: Accepted by Manolo Tripathi MD on 8/8/2018  4:16 PM    dextrose (D50W) solution 25 g 25 g Every 15 Minutes PRN 8/9/2018     Sig - Route: Infuse 50 mL into a venous catheter Every 15 (Fifteen) Minutes As Needed for Low Blood Sugar (Blood Sugar Less Than 70). - Intravenous    Cosign for Ordering: Accepted by Manolo Tripathi MD on 8/9/2018 12:19 PM    dextrose (GLUTOSE) oral gel 15 g 15 g Every 15 Minutes PRN 8/8/2018     Sig - Route: Take 15 g by mouth Every 15 (Fifteen) Minutes As Needed for Low Blood Sugar (Blood sugar less than 70). - Oral    Cosign for Ordering: Accepted by Manolo Tripathi MD on 8/8/2018  4:16 PM    dextrose (GLUTOSE) oral gel 15 g 15 g Every 15 Minutes PRN 8/9/2018     Sig - Route: Take 15 g by mouth Every 15 (Fifteen) Minutes As Needed for Low Blood Sugar (Blood sugar less than 70). - Oral    Cosign for Ordering: Accepted by Manolo Tripathi MD on 8/9/2018 12:19 PM    glucagon (human recombinant) (GLUCAGEN DIAGNOSTIC) injection 1 mg 1 mg As Needed 8/8/2018     Sig - Route: Inject 1 mg under the skin into the appropriate area as directed As Needed (Blood Glucose Less Than 70). - Subcutaneous    Cosign for Ordering: Accepted by Manolo Tripathi MD on 8/8/2018  4:16 PM    glucagon (human recombinant) (GLUCAGEN DIAGNOSTIC) injection 1 mg 1 mg As Needed 8/9/2018     Sig - Route: Inject 1 mg under the skin into the appropriate  area as directed As Needed (Blood Glucose Less Than 70). - Subcutaneous    Cosign for Ordering: Accepted by Manolo Tripathi MD on 8/9/2018 12:19 PM    heparin (porcine) 5000 UNIT/ML injection 5,000 Units 5,000 Units Every 12 Hours Scheduled 8/8/2018     Sig - Route: Inject 1 mL under the skin into the appropriate area as directed Every 12 (Twelve) Hours. - Subcutaneous    Cosign for Ordering: Accepted by Manolo Tripathi MD on 8/8/2018  4:16 PM    insulin aspart (novoLOG) injection 0-9 Units 0-9 Units 4 Times Daily Before Meals & Nightly 8/9/2018     Sig - Route: Inject 0-9 Units under the skin into the appropriate area as directed 4 (Four) Times a Day Before Meals & at Bedtime. - Subcutaneous    Cosign for Ordering: Accepted by Manolo Tripathi MD on 8/9/2018 12:19 PM    insulin detemir (LEVEMIR) injection 10 Units 10 Units Nightly 8/9/2018     Sig - Route: Inject 10 Units under the skin into the appropriate area as directed Every Night. - Subcutaneous    Cosign for Ordering: Accepted by Manolo Tripathi MD on 8/9/2018 12:19 PM    lactobacillus acidophilus (RISAQUAD) capsule 1 capsule 1 capsule Daily 8/9/2018     Sig - Route: Take 1 capsule by mouth Daily. - Oral    nitroglycerin (NITROSTAT) SL tablet 0.4 mg 0.4 mg Every 5 Minutes PRN 8/8/2018     Sig - Route: Place 1 tablet under the tongue Every 5 (Five) Minutes As Needed for Chest Pain (if systolic BP greater than 100 mm/Hg.). - Sublingual    Cosign for Ordering: Accepted by Manolo Tripathi MD on 8/8/2018  4:16 PM    sodium chloride 0.9 % flush 1-10 mL 1-10 mL As Needed 8/8/2018     Sig - Route: Infuse 1-10 mL into a venous catheter As Needed for Line Care. - Intravenous    Cosign for Ordering: Accepted by Manolo Tripathi MD on 8/8/2018  4:16 PM    sodium chloride 0.9 % flush 10 mL 10 mL As Needed 8/8/2018     Sig - Route: Infuse 10 mL into a venous catheter As Needed for Line Care. - Intravenous    Cosign for Ordering: Accepted by Placido Holland MD on  8/8/2018  1:14 PM    sodium chloride 0.9 % infusion 125 mL/hr Continuous 8/8/2018     Sig - Route: Infuse 125 mL/hr into a venous catheter Continuous. - Intravenous    Cosign for Ordering: Accepted by Manolo Tripathi MD on 8/8/2018  4:16 PM    vancomycin (VANCOCIN) 1,250 mg in sodium chloride 0.9 % 250 mL IVPB 1,250 mg Every 12 Hours 8/9/2018 8/15/2018    Sig - Route: Infuse 1,250 mg into a venous catheter Every 12 (Twelve) Hours. - Intravenous    vancomycin (VANCOCIN) 1,500 mg in sodium chloride 0.9 % 500 mL IVPB 20 mg/kg × 79.4 kg Once 8/8/2018 8/8/2018    Sig - Route: Infuse 1,500 mg into a venous catheter 1 (One) Time. - Intravenous    Cosign for Ordering: Accepted by Placido Holland MD on 8/8/2018  1:14 PM    fentaNYL citrate (PF) (SUBLIMAZE) injection (Discontinued)  As Needed 8/8/2018 8/8/2018    Sig - Route: Infuse  into a venous catheter As Needed for Severe Pain . - Intravenous    Reason for Discontinue: Anesthesia Stop    lactated ringers infusion (Discontinued) 125 mL/hr Continuous 8/8/2018 8/9/2018    Sig - Route: Infuse 125 mL/hr into a venous catheter Continuous. - Intravenous    metFORMIN (GLUCOPHAGE) tablet 500 mg (Discontinued) 500 mg 2 Times Daily With Meals 8/9/2018 8/9/2018    Sig - Route: Take 1 tablet by mouth 2 (Two) Times a Day With Meals. - Oral    Cosign for Ordering: Accepted by Manolo Tripathi MD on 8/9/2018 12:19 PM    midazolam (VERSED) injection (Discontinued)  As Needed 8/8/2018 8/8/2018    Sig - Route: Infuse  into a venous catheter As Needed. - Intravenous    Reason for Discontinue: Anesthesia Stop    ondansetron (ZOFRAN) injection (Discontinued)  As Needed 8/8/2018 8/8/2018    Sig - Route: Infuse  into a venous catheter As Needed for Nausea or Vomiting. - Intravenous    Reason for Discontinue: Anesthesia Stop    Pharmacy Consult - Pharmacy to dose (Discontinued)  Continuous PRN 8/8/2018 8/8/2018    Sig - Route: Continuous As Needed for Consult. - Does not apply    Reason for  Discontinue: Dose adjustment    Cosign for Ordering: Accepted by Manolo Tripathi MD on 8/8/2018  4:16 PM    Pharmacy Consult - Pharmacy to dose (Discontinued)  Continuous PRN 8/8/2018 8/8/2018    Sig - Route: Continuous As Needed for Consult. - Does not apply    Reason for Discontinue: Dose adjustment    Cosign for Ordering: Accepted by Manolo Tripathi MD on 8/8/2018  4:16 PM    piperacillin-tazobactam (ZOSYN) 3.375 g/100 mL 0.9% NS IVPB (mbp) (Discontinued) 3.375 g Every 8 Hours 8/8/2018 8/8/2018    Sig - Route: Infuse 100 mL into a venous catheter Every 8 (Eight) Hours. - Intravenous    propofol (DIPRIVAN) infusion 10 mg/mL 100 mL (Discontinued)  Continuous PRN 8/8/2018 8/8/2018    Sig - Route: Infuse  into a venous catheter Continuous As Needed. - Intravenous    Reason for Discontinue: Anesthesia Stop    Propofol (DIPRIVAN) injection (Discontinued)  As Needed 8/8/2018 8/8/2018    Sig - Route: Infuse  into a venous catheter As Needed. - Intravenous    Reason for Discontinue: Anesthesia Stop    sodium chloride 0.9 % flush 1-10 mL (Discontinued) 1-10 mL As Needed 8/8/2018 8/8/2018    Sig - Route: Infuse 1-10 mL into a venous catheter As Needed for Line Care. - Intravenous    Reason for Discontinue: Patient Transfer    sodium chloride 1,000 mL with vancomycin 1,000 mg irrigation (Discontinued)  As Needed 8/8/2018 8/8/2018    Sig: As Needed.    Reason for Discontinue: Patient Discharge    sterile water irrigation solution (Discontinued)  As Needed 8/8/2018 8/8/2018    Sig: As Needed.    Reason for Discontinue: Patient Discharge            Cultures:    Blood Culture   Date Value Ref Range Status   08/08/2018 No growth at 24 hours  Preliminary   08/08/2018 No growth at 24 hours  Preliminary     Urine Culture   Date Value Ref Range Status   08/08/2018 Normal Urogenital Kelle  Preliminary           Assessment/Plan     ASSESSMENT:    1.  Septic arthritis versus severe cellulitis  2.  Lower extremity abscess    PLAN:    The  patient seems to be doing well this morning with some pain to the left lower extremity.  Wound VAC in place and scheduled to be changed today.  CRP is showing some improvement from 4.81 down to 3.80 with normal white count.    Left ankle culture from 18 finalized as MSSA.  Repeat left ankle culture preliminary shows growth of gram-positive cocci consistent with staph species. Urine culture from 18 finalized as 50-60,000 colonies of normal urogenital kandace.    Dr. Artis reported no evidence of contamination of the ankle joint, or osteomyelitis.    Blood cultures show no growth.      Differential diagnosis could be gonococcal arthritis  or inflammatory reactive arthritis.      Based on finalization of left ankle wound culture antibiotic therapy was changed to high-dose Rocephin monotherapy 2 g IV every 24 hours.     Recommend to  de-escalate Rocephin to Keflex 500 mg 1 by mouth 3 times a day upon discharge through 18.    CRP ordered for a.m.        Current antimicrobials:     Rocephin 2 g IV Q24H       Patient's findings and recommendations were discussed with patient, nursing staff, primary care team and consulting provider    Code Status:   Code Status and Medical Interventions:   Ordered at: 18 1559     Code Status:    CPR     Medical Interventions (Level of Support Prior to Arrest):    Full     Comments:    Any long term medical decisions to be made by her brother, Eric Oconnor who she reports to be her next of kin, in the event she can't decide for herself.       Piedad Lugo PA-C  18  10:12 AM    Electronically signed by Piedad Lugo PA-C at 2018 10:16 AM     Chato Levi MD at 2018  8:12 AM              [facility] HOSPITALIST PROGRESS NOTE     Patient Identification:  Name:  Cynthia Ríos  Age:  33 y.o.  Sex:  female  :  1985  MRN:  [id number]  Visit Number:  [id number]  Primary Care Provider:  Provider, No Known    Length of stay:   5    Chief complaint:  33 y.o. old female admitted with Abscess          Subjective:    No acute issues overnight.  Patient states that she's feeling much better today.  She is complaining of some nausea but denies any vomiting.  She states that she has had significant improvement since admission.  Denies any other complaints.         Current Hospital Meds:    ceftriaxone 2 g Intravenous Q24H   famotidine 20 mg Oral Daily   ferrous sulfate 325 mg Oral Daily With Breakfast   heparin (porcine) 5,000 Units Subcutaneous Q12H   insulin aspart 0-9 Units Subcutaneous 4x Daily AC & at Bedtime   insulin aspart 10 Units Subcutaneous TID With Meals   insulin detemir 25 Units Subcutaneous Nightly   lactobacillus acidophilus 1 capsule Oral Daily   multivitamin 1 tablet Oral Daily        ----------------------------------------------------------------------------------------------------------------------  Vital Signs:  Temp:  [98.3 °F (36.8 °C)-98.9 °F (37.2 °C)] 98.9 °F (37.2 °C)  Heart Rate:  [81-88] 88  Resp:  [16-20] 20  BP: (138-153)/(79-97) 138/90  1    08/08/18  1044 08/08/18  1425   Weight: 79.4 kg (175 lb) 79 kg (174 lb 1.6 oz)     Body mass index is 29.88 kg/m².    Intake/Output Summary (Last 24 hours) at 08/13/18 0812  Last data filed at 08/13/18 0300   Gross per 24 hour   Intake              820 ml   Output                0 ml   Net              820 ml     Diet Regular; Consistent Carbohydrate  ----------------------------------------------------------------------------------------------------------------------  Physical exam:  Constitutional:  Well-developed and well-nourished.     HENT:  Head:  Normocephalic and atraumatic.  Mouth:  Moist mucous membranes.    Eyes:  Conjunctivae and EOM are normal.  Pupils are equal, round, and reactive to light.   Neck:  Neck supple.  No JVD present.    Cardiovascular:  Regular rate and rhythm. S1+S2. No murmur, rubs or gallops.   Pulmonary/Chest: Clear to auscultation bilaterally.    Abdominal:  Soft. Non-tender. No viscera palpable.  Bowel sounds audible.   Musculoskeletal: Left ankle wound with wound VAC in place.  No surrounding erythema.  Swelling on left foot with no significant erythema or tenderness to palpation.  Peripheral vascular: Bilateral dorsalis pedis palpable. No edema.   Neurological:  Alert and oriented to person, place, and time.  Cranial nerves grossly intact. Strength bilaterally symmetrical in upper and lower extremities.   Skin:  Skin is warm and dry. No rash noted. No pallor.   ----------------------------------------------------------------------------------------------------------------------  Tele:  Pt not on telemetry  ----------------------------------------------------------------------------------------------------------------------        Results from last 7 days  Lab Units 08/13/18  0111 08/12/18  0108 08/11/18  0406  08/08/18  1153   CRP mg/dL 3.80* 4.81* 7.10*  < > 19.24*   LACTATE mmol/L  --   --   --   --  1.2   WBC 10*3/mm3 9.02 9.58 10.43  < > 21.04*   HEMOGLOBIN g/dL 11.1* 11.0* 11.4*  < > 13.3   HEMATOCRIT % 33.3* 32.6* 33.8*  < > 38.9   MCV fL 90.2 89.1 88.9  < > 88.2   MCHC g/dL 33.3 33.7 33.7  < > 34.2   PLATELETS 10*3/mm3 478* 496* 469*  < > 440*   < > = values in this interval not displayed.        Results from last 7 days  Lab Units 08/13/18  0111 08/12/18  1904 08/12/18  0108 08/11/18  0406  08/08/18  1153   SODIUM mmol/L 138  --  140 142  < > 135   POTASSIUM mmol/L 3.9 4.1 3.6 3.9  < > 3.9   MAGNESIUM mg/dL  --   --   --   --   --  2.2   CHLORIDE mmol/L 104  --  106 108  < > 102   CO2 mmol/L 29.4  --  26.9 26.9  < > 22.3*   BUN mg/dL 8  --  7 10  < > 9   CREATININE mg/dL 0.48  --  0.65 0.48  < > 0.64   EGFR IF NONAFRICN AM mL/min/1.73 147  --  104 147  < > 106   CALCIUM mg/dL 8.6  --  8.2 8.5  < > 9.0   GLUCOSE mg/dL 192*  --  196* 170*  < > 275*   ALBUMIN g/dL 3.00*  --  3.00* 3.20*  < > 4.10   BILIRUBIN mg/dL 0.2  --  0.2 0.2  < > 0.6   ALK  PHOS U/L 107*  --  117* 112*  < > 110*   AST (SGOT) U/L 39*  --  48* 49*  < > 16   ALT (SGPT) U/L 53*  --  47* 63*  < > 16   < > = values in this interval not displayed.Estimated Creatinine Clearance: 166.3 mL/min (by C-G formula based on SCr of 0.48 mg/dL).    No results found for: AMMONIA      Blood Culture   Date Value Ref Range Status   08/08/2018 No growth at 4 days  Preliminary   08/08/2018 No growth at 4 days  Preliminary     Urine Culture   Date Value Ref Range Status   08/08/2018 50,000-60,000 CFU/mL Normal Urogenital Kelle  Final     Wound Culture   Date Value Ref Range Status   08/08/2018 Heavy growth (4+) Staphylococcus aureus (A)  Final          I have personally looked at the labs and they are summarized above.  ----------------------------------------------------------------------------------------------------------------------  Imaging Results (last 24 hours)     Procedure Component Value Units Date/Time    US Venous Doppler Lower Extremity Left (duplex) [[id number]] Collected:  08/12/18 1052     Updated:  08/12/18 1054    Narrative:       EXAMINATION: US VENOUS DOPPLER LOWER EXTREMITY LEFT (DUPLEX)-      CLINICAL INDICATION:  LLE edema; A41.9-Sepsis, unspecified organism;  L03.116-Cellulitis of left lower limb; R73.9-Hyperglycemia, unspecified     TECHNIQUE: Multiplanar gray scale and Doppler vascular sonographic  imaging of the deep veins of LEFT lower extremity, without and with  compression.      COMPARISON: NONE      FINDINGS:   The visualized deep veins demonstrate flow and are compressible. No  evidence of deep venous thrombosis.   Mild edema noted.       Impression:       No DVT. Soft tissue edema noted.     This report was finalized on 8/12/2018 10:52 AM by Dr. Erlin J Arlette,  MD.           ----------------------------------------------------------------------------------------------------------------------  Assessment and Plan:    Cellulitis and abscess due to MSSA  S/p I&D, excisional  debridement of skin and soft tissue.  Currently on IV Rocephin and will continue per ID recommendations.  Orthopedic surgery following and planning on changing wound VAC today.  WBC is normal and CRP is improving.  No growth on blood cultures so far.    Diabetes mellitus  Newly diagnosed.  A1c is 10.  Blood sugar is running around 200.  Increase Levemir to 30 units at bedtime and continue NovoLog with meals and sliding scale insulin.  Monitor blood sugar with Accu-Cheks.    Elevated transaminases  Hepatitis panel is negative.  LFTs improving.    Disposition  Waiting for evaluation from orthopedics and will need recommendations from infectious diseases regarding choice of antibiotics and length of therapy.      Prophylaxis  DVT: Heparin    The patient is high risk due to: Cellulitis and abscess, uncontrolled diabetes mellitus    I discussed the patients findings and my recommendations with patient and nursing staff.    Chato Levi MD  08/13/18  8:12 AM    Electronically signed by Chato Levi MD at 8/13/2018 12:40 PM       Consult Notes (last 24 hours) (Notes from 8/12/2018  1:21 PM through 8/13/2018  1:21 PM)     No notes of this type exist for this encounter.        Only active medications are shown.   Scheduled Meds Sorted by Name   for Cynthia Ríos as of 08/13/18 1322    Legend:                           Inactive     Active     Linked               Medications 08/13/18 08/14/18 08/15/18 08/16/18 08/17/18 08/18/18 08/19/18   cefTRIAXone (ROCEPHIN) 2 g/100 mL 0.9% NS VTB (DENISHA)  Dose: 2 g  Freq: Every 24 Hours Route: IV  Indications of Use: SKIN AND SOFT TISSUE INFECTION  Start: 08/08/18 1700 End: 08/16/18 1124    Admin Instructions:   Caution: Look alike/sound alike drug alert. Activate vial before using.    1700        1700        1700        1124-D/C'd         famotidine (PEPCID) tablet 20 mg  Dose: 20 mg  Freq: Daily Route: PO  Start: 08/10/18 1000    0914        0900        0900        0900         0900 0900 0900          ferrous sulfate tablet 325 mg  Dose: 325 mg  Freq: Daily With Breakfast Route: PO  Start: 08/11/18 0900    Admin Instructions:   Swallow whole. Do not crush, split, or chew. Take with food if GI upset occurs.    0914 0800 0800 0800 0800 0800 0800          heparin (porcine) 5000 UNIT/ML injection 5,000 Units  Dose: 5,000 Units  Freq: Every 12 Hours Scheduled Route: SC  Indications Comment: Prophylaxis of Venous Thromboembolism  Start: 08/08/18 2100 0914 2100 0900 2100 0900 2100 0900 2100 0900 2100 0900 2100 0900 2100          insulin aspart (novoLOG) injection 0-9 Units  Dose: 0-9 Units  Freq: 4 Times Daily Before Meals & Nightly Route: SC  Start: 08/09/18 1130    Admin Instructions:   Correction - Moderate Dose.  40-60 units/day total insulin dose or average weight, on oral agents    Blood glucose 150-199 mg/dL - 2 units  Blood glucose 200-249 mg/dL - 4 units  Blood glucose 250-299 mg/dL - 6 units  Blood glucose 300-349 mg/dL - 7 units  Blood glucose 350-400 mg/dL - 8 units  Blood glucose greater than 400 mg/dL - 9 units and call provider      ([id number])       1200       1730       2100        0730       1130       1730       2100        0730       1130       1730       2100        0730       1130       1730       2100        0730       1130       1730       2100        0730       1130       1730       2100        0730       1130       1730       2100          insulin aspart (novoLOG) injection 10 Units  Dose: 10 Units  Freq: 3 Times Daily With Meals Route: SC  Start: 08/12/18 1200    Admin Instructions:       ([id number])       1201       1800        0800       1200       1800        0800       1200       1800        0800       1200       1800        0800       1200       1800        0800       1200       1800        0800       1200       1800          insulin  detemir (LEVEMIR) injection 30 Units  Dose: 30 Units  Freq: Nightly Route: SC  Start: 08/13/18 2100    Admin Instructions:       2100 2100 2100 2100 2100 2100 2100          lactobacillus acidophilus (RISAQUAD) capsule 1 capsule  Dose: 1 capsule  Freq: Daily Route: PO  Start: 08/09/18 1230    0914        0900        0900        0900        0900        0900        0900          multivitamin (DAILY MARY) tablet 1 tablet  Dose: 1 tablet  Freq: Daily Route: PO  Start: 08/11/18 1500    0914        0900        0900        0900        0900        0900        0900          Medications 08/13/18 08/14/18 08/15/18 08/16/18 08/17/18 08/18/18 08/19/18       Continuous Meds Sorted by Name   for Cynthia Ríos as of 08/13/18 1322    Legend:                           Inactive     Active     Linked               Medications 08/13/18 08/14/18 08/15/18 08/16/18 08/17/18 08/18/18 08/19/18       PRN Meds Sorted by Name   for Cynthia Ríos as of 08/13/18 1322    Legend:                           Inactive     Active     Linked               Medications 08/13/18 08/14/18 08/15/18 08/16/18 08/17/18 08/18/18 08/19/18   dextrose (D50W) solution 25 g  Dose: 25 g  Freq: Every 15 Minutes PRN Route: IV  PRN Reason: Low Blood Sugar  PRN Comment: Blood Sugar Less Than 70  Start: 08/09/18 0751    Admin Instructions:   Blood sugar less than 70; patient has IV access - Unresponsive, NPO or Unable To Safely Swallow             dextrose (D50W) solution 25 g  Dose: 25 g  Freq: Every 15 Minutes PRN Route: IV  PRN Reason: Low Blood Sugar  PRN Comment: Blood Sugar Less Than 70  Start: 08/08/18 1500    Admin Instructions:   Blood sugar less than 70; patient has IV access - Unresponsive, NPO or Unable To Safely Swallow             dextrose (GLUTOSE) oral gel 15 g  Dose: 15 g  Freq: Every 15 Minutes PRN Route: PO  PRN Reason: Low Blood Sugar  PRN Comment: Blood sugar less than 70  Start: 08/09/18 0751    Admin  Instructions:   BS<70, Patient Alert, Is not NPO, Can safely swallow.             dextrose (GLUTOSE) oral gel 15 g  Dose: 15 g  Freq: Every 15 Minutes PRN Route: PO  PRN Reason: Low Blood Sugar  PRN Comment: Blood sugar less than 70  Start: 08/08/18 1500    Admin Instructions:   BS<70, Patient Alert, Is not NPO, Can safely swallow.             glucagon (human recombinant) (GLUCAGEN DIAGNOSTIC) injection 1 mg  Dose: 1 mg  Freq: As Needed Route: SC  PRN Comment: Blood Glucose Less Than 70  Start: 08/09/18 0751    Admin Instructions:   Blood Glucose Less Than 70 - Patient Without IV Access - Unresponsive, NPO or Unable To Safely Swallow             glucagon (human recombinant) (GLUCAGEN DIAGNOSTIC) injection 1 mg  Dose: 1 mg  Freq: As Needed Route: SC  PRN Comment: Blood Glucose Less Than 70  Start: 08/08/18 1500    Admin Instructions:   Blood Glucose Less Than 70 - Patient Without IV Access - Unresponsive, NPO or Unable To Safely Swallow             ibuprofen (ADVIL,MOTRIN) tablet 200 mg  Dose: 200 mg  Freq: Every 4 Hours PRN Route: PO  PRN Reason: Mild Pain   Start: 08/10/18 0832    Admin Instructions:   If given for pain, use the following pain scale:  Mild Pain = Pain Score of 1-3, CPOT 1-2  Moderate Pain = Pain Score of 4-6, CPOT 3-4  Severe Pain = Pain Score of 7-10, CPOT 5-8    0914                Magnesium Sulfate 2 gram Bolus, followed by 8 gram infusion (total Mg dose 10 grams)- Mg less than or equal to 1mg/dL  Dose: 2 g  Freq: As Needed Route: IV  PRN Comment: See Administration Instructions  Start: 08/10/18 0749    Admin Instructions:   Mg less than or equal to 1mg/dL. Give 2 gm over 30 minutes as bolus, then infuse 2 gm over 2 hours for 4 doses (8 grams) for total dose of 10 grams.  Recheck Mg levels in the AM.             Or  Magnesium Sulfate 2 gram / 50mL Infusion (GIVE X 3 BAGS TO EQUAL 6GM TOTAL DOSE) - Mg 1.1 - 1.5 mg/dl  Dose: 2 g  Freq: As Needed Route: IV  PRN Comment: See Administration  Instructions  Start: 08/10/18 0749    Admin Instructions:   Mg 1.1 -1.5 mg/dL. Infuse 2 grams over 2 hours for 3 doses (for a total Mg dose of 6 grams).  Recheck Mg level in the AM.             Or  Magnesium Sulfate 4 gram infusion- Mg 1.6-1.9 mg/dL  Dose: 4 g  Freq: As Needed Route: IV  PRN Comment: See Administration Instructions  Start: 08/10/18 0749    Admin Instructions:   Mg 1.6-1.9 mg/dL. Recheck Mg level in the AM.             morphine injection 2 mg  Dose: 2 mg  Freq: Every 4 Hours PRN Route: IV  PRN Reason: Severe Pain   Start: 08/10/18 0949 End: 08/20/18 0948    Admin Instructions:   Hold for SBP less than 110.  Hold for sedation/respiratory distress.  If given for pain, use the following pain scale:  Mild Pain = Pain Score of 1-3, CPOT 1-2  Moderate Pain = Pain Score of 4-6, CPOT 3-4  Severe Pain = Pain Score of 7-10, CPOT 5-8             nitroglycerin (NITROSTAT) SL tablet 0.4 mg  Dose: 0.4 mg  Freq: Every 5 Minutes PRN Route: SL  PRN Reason: Chest Pain  PRN Comment: if systolic BP greater than 100 mm/Hg.  Start: 08/08/18 1407    Admin Instructions:   If pain unrelieved after 3 doses, notify MD.             potassium chloride (MICRO-K) CR capsule 40 mEq  Dose: 40 mEq  Freq: As Needed Route: PO  PRN Comment: potassium replacement.  see admin instructions  Start: 08/10/18 0749    Admin Instructions:   Potassium replacement    Oral (may give capsule or powder packet)  If K+ less than or equal to 3.1 give KCl 40 mEq q4h x 3 doses  If K+ 3.2-3.6 give KCl 40 mEq q4h x 2 doses    Peripheral IV  If K+ less than or equal to 3.1 give KCl 10 mEq/100 mL NS IV q1h x 6 doses  If K+ 3.2-3.6 give KCl 10 mEq/100 mL NS q1h x 4 doses    Central Line  If K+ less than or equal to 3.1 give KCl 20 mEq/50 mL NS IV q1h x 3 doses  If K+ 3.2-3.6 give KCl 20 mEq/50 mL NS q1h x 2 doses    Check potassium 4 hours after last dose given.  Check magnesium if K stays low after replacement.  DO NOT GIVE if CrCl is less than 30 mL/minute  or urine output is less than 30 mL/hr             Or  potassium chloride (KLOR-CON) packet 40 mEq  Dose: 40 mEq  Freq: As Needed Route: PO  PRN Comment: potassium replacement, see admin instructions  Start: 08/10/18 0749    Admin Instructions:   Potassium replacement    Oral (may give capsule or powder packet)  If K+ less than or equal to 3.1 give KCl 40 mEq q4h x 3 doses  If K+ 3.2-3.6 give KCl 40 mEq q4h x 2 doses    Peripheral IV  If K+ less than or equal to 3.1 give KCl 10 mEq/100 mL NS IV q1h x 6 doses  If K+ 3.2-3.6 give KCl 10 mEq/100 mL NS q1h x 4 doses    Central Line  If K+ less than or equal to 3.1 give KCl 20 mEq/50 mL NS IV q1h x 3 doses  If K+ 3.2-3.6 give KCl 20 mEq/50 mL NS q1h x 2 doses    Check potassium 4 hours after last dose given.  Check magnesium if K stays low after replacement.  DO NOT GIVE if CrCl is less than 30 mL/minute or urine output is less than 30 mL/hr             Or  potassium chloride 10 mEq in 100 mL IVPB  Dose: 10 mEq  Freq: Every 1 Hour PRN Route: IV  PRN Comment: potassium protocol PERIPHERAL - see admin instructions  Start: 08/10/18 0749    Admin Instructions:   Potassium replacement - patient NPO or cannot tolerate oral potassium    Peripheral or Central IV  If K+ less than or equal to 3.1 give KCl 10 mEq/100 mL NS IV q1h x 6 doses  If K+ 3.2-3.6 give KCl 10 mEq/100 mL NS q1h x 4 doses    Check potassium 4 hours after last dose given.  Check magnesium if K stays low after replacement.  DO NOT GIVE if CrCl is less than 30 mL/minute or urine output is less than 30 mL/hr. Rates greater than 10mEq/hr require ECG monitoring.             sodium chloride 0.9 % flush 1-10 mL  Dose: 1-10 mL  Freq: As Needed Route: IV  PRN Reason: Line Care  Start: 08/08/18 1407             sodium chloride 0.9 % flush 10 mL  Dose: 10 mL  Freq: As Needed Route: IV  PRN Reason: Line Care  Start: 08/08/18 1121             Medications 08/13/18 08/14/18 08/15/18 08/16/18 08/17/18 08/18/18 08/19/18

## 2018-08-14 LAB
ALBUMIN SERPL-MCNC: 3.1 G/DL (ref 3.5–5)
ALBUMIN/GLOB SERPL: 1 G/DL (ref 1.5–2.5)
ALP SERPL-CCNC: 103 U/L (ref 35–104)
ALT SERPL W P-5'-P-CCNC: 47 U/L (ref 10–36)
ANION GAP SERPL CALCULATED.3IONS-SCNC: 5.9 MMOL/L (ref 3.6–11.2)
AST SERPL-CCNC: 31 U/L (ref 10–30)
BASOPHILS # BLD AUTO: 0.05 10*3/MM3 (ref 0–0.3)
BASOPHILS NFR BLD AUTO: 0.5 % (ref 0–2)
BILIRUB SERPL-MCNC: 0.2 MG/DL (ref 0.2–1.8)
BUN BLD-MCNC: 10 MG/DL (ref 7–21)
BUN/CREAT SERPL: 14.1 (ref 7–25)
CALCIUM SPEC-SCNC: 8.8 MG/DL (ref 7.7–10)
CHLORIDE SERPL-SCNC: 99 MMOL/L (ref 99–112)
CO2 SERPL-SCNC: 33.1 MMOL/L (ref 24.3–31.9)
CREAT BLD-MCNC: 0.71 MG/DL (ref 0.43–1.29)
CRP SERPL-MCNC: 2.81 MG/DL (ref 0–0.99)
DEPRECATED RDW RBC AUTO: 38.7 FL (ref 37–54)
EOSINOPHIL # BLD AUTO: 0.28 10*3/MM3 (ref 0–0.7)
EOSINOPHIL NFR BLD AUTO: 3 % (ref 0–5)
ERYTHROCYTE [DISTWIDTH] IN BLOOD BY AUTOMATED COUNT: 12.3 % (ref 11.5–14.5)
GFR SERPL CREATININE-BSD FRML MDRD: 94 ML/MIN/1.73
GLOBULIN UR ELPH-MCNC: 3.1 GM/DL
GLUCOSE BLD-MCNC: 221 MG/DL (ref 70–110)
GLUCOSE BLDC GLUCOMTR-MCNC: 258 MG/DL (ref 70–130)
GLUCOSE BLDC GLUCOMTR-MCNC: 293 MG/DL (ref 70–130)
HCT VFR BLD AUTO: 33.7 % (ref 37–47)
HGB BLD-MCNC: 11.2 G/DL (ref 12–16)
IMM GRANULOCYTES # BLD: 0.19 10*3/MM3 (ref 0–0.03)
IMM GRANULOCYTES NFR BLD: 2 % (ref 0–0.5)
LYMPHOCYTES # BLD AUTO: 2.82 10*3/MM3 (ref 1–3)
LYMPHOCYTES NFR BLD AUTO: 30.1 % (ref 21–51)
MCH RBC QN AUTO: 29.8 PG (ref 27–33)
MCHC RBC AUTO-ENTMCNC: 33.2 G/DL (ref 33–37)
MCV RBC AUTO: 89.6 FL (ref 80–94)
MONOCYTES # BLD AUTO: 0.64 10*3/MM3 (ref 0.1–0.9)
MONOCYTES NFR BLD AUTO: 6.8 % (ref 0–10)
NEUTROPHILS # BLD AUTO: 5.38 10*3/MM3 (ref 1.4–6.5)
NEUTROPHILS NFR BLD AUTO: 57.6 % (ref 30–70)
OSMOLALITY SERPL CALC.SUM OF ELEC: 281.5 MOSM/KG (ref 273–305)
PLATELET # BLD AUTO: 473 10*3/MM3 (ref 130–400)
PMV BLD AUTO: 8.8 FL (ref 6–10)
POTASSIUM BLD-SCNC: 4 MMOL/L (ref 3.5–5.3)
PROT SERPL-MCNC: 6.2 G/DL (ref 6–8)
RBC # BLD AUTO: 3.76 10*6/MM3 (ref 4.2–5.4)
SODIUM BLD-SCNC: 138 MMOL/L (ref 135–153)
WBC NRBC COR # BLD: 9.36 10*3/MM3 (ref 4.5–12.5)

## 2018-08-14 PROCEDURE — 63710000001 INSULIN DETEMIR PER 5 UNITS: Performed by: INTERNAL MEDICINE

## 2018-08-14 PROCEDURE — 63710000001 INSULIN ASPART PER 5 UNITS: Performed by: PHYSICIAN ASSISTANT

## 2018-08-14 PROCEDURE — 99233 SBSQ HOSP IP/OBS HIGH 50: CPT | Performed by: INTERNAL MEDICINE

## 2018-08-14 PROCEDURE — 80053 COMPREHEN METABOLIC PANEL: CPT | Performed by: PHYSICIAN ASSISTANT

## 2018-08-14 PROCEDURE — 82962 GLUCOSE BLOOD TEST: CPT

## 2018-08-14 PROCEDURE — 25010000002 CEFTRIAXONE: Performed by: NURSE PRACTITIONER

## 2018-08-14 PROCEDURE — 25010000002 HEPARIN (PORCINE) PER 1000 UNITS: Performed by: PHYSICIAN ASSISTANT

## 2018-08-14 PROCEDURE — 94799 UNLISTED PULMONARY SVC/PX: CPT

## 2018-08-14 PROCEDURE — 86140 C-REACTIVE PROTEIN: CPT | Performed by: PHYSICIAN ASSISTANT

## 2018-08-14 PROCEDURE — 63710000001 INSULIN ASPART PER 5 UNITS: Performed by: INTERNAL MEDICINE

## 2018-08-14 PROCEDURE — 85025 COMPLETE CBC W/AUTO DIFF WBC: CPT | Performed by: PHYSICIAN ASSISTANT

## 2018-08-14 RX ORDER — DOCUSATE SODIUM 100 MG/1
100 CAPSULE, LIQUID FILLED ORAL 2 TIMES DAILY
Status: DISCONTINUED | OUTPATIENT
Start: 2018-08-14 | End: 2018-08-15 | Stop reason: HOSPADM

## 2018-08-14 RX ADMIN — HEPARIN SODIUM 5000 UNITS: 5000 INJECTION, SOLUTION INTRAVENOUS; SUBCUTANEOUS at 09:05

## 2018-08-14 RX ADMIN — HEPARIN SODIUM 5000 UNITS: 5000 INJECTION, SOLUTION INTRAVENOUS; SUBCUTANEOUS at 21:11

## 2018-08-14 RX ADMIN — INSULIN ASPART 10 UNITS: 100 INJECTION, SOLUTION INTRAVENOUS; SUBCUTANEOUS at 09:04

## 2018-08-14 RX ADMIN — Medication 1 CAPSULE: at 09:05

## 2018-08-14 RX ADMIN — INSULIN ASPART 10 UNITS: 100 INJECTION, SOLUTION INTRAVENOUS; SUBCUTANEOUS at 17:21

## 2018-08-14 RX ADMIN — INSULIN DETEMIR 30 UNITS: 100 INJECTION, SOLUTION SUBCUTANEOUS at 21:15

## 2018-08-14 RX ADMIN — Medication 1 TABLET: at 09:05

## 2018-08-14 RX ADMIN — INSULIN ASPART 10 UNITS: 100 INJECTION, SOLUTION INTRAVENOUS; SUBCUTANEOUS at 12:13

## 2018-08-14 RX ADMIN — INSULIN ASPART 6 UNITS: 100 INJECTION, SOLUTION INTRAVENOUS; SUBCUTANEOUS at 21:11

## 2018-08-14 RX ADMIN — CEFTRIAXONE 2 G: 2 INJECTION, POWDER, FOR SOLUTION INTRAMUSCULAR; INTRAVENOUS at 17:21

## 2018-08-14 RX ADMIN — FERROUS SULFATE TAB 325 MG (65 MG ELEMENTAL FE) 325 MG: 325 (65 FE) TAB at 09:05

## 2018-08-14 RX ADMIN — INSULIN ASPART 6 UNITS: 100 INJECTION, SOLUTION INTRAVENOUS; SUBCUTANEOUS at 09:04

## 2018-08-14 RX ADMIN — INSULIN ASPART 6 UNITS: 100 INJECTION, SOLUTION INTRAVENOUS; SUBCUTANEOUS at 12:13

## 2018-08-14 RX ADMIN — IBUPROFEN 200 MG: 200 TABLET, FILM COATED ORAL at 21:11

## 2018-08-14 RX ADMIN — FAMOTIDINE 20 MG: 20 TABLET, FILM COATED ORAL at 09:05

## 2018-08-14 NOTE — PAYOR COMM NOTE
"Contact: [place]o RN @ [facility]  Phone: [phone]  Fax: [phone]    Auth# [id number]  Clinical update      Cynthia Rizo  (33 y.o. Female)     Date of Birth Social Security Number Address Home Phone MRN    1985  [address] [place] [address] [phone] [id number]    [employer] Marital Status          None Single       Admission Date Admission Type Admitting Provider Attending Provider Department, Room/Bed    8/8/18 Emergency Manolo Tripathi MD Perkins, Jimmye S, MD [address], 3340/2S    Discharge Date Discharge Disposition Discharge Destination                       Attending Provider:  Nikko Chaudhary MD    Allergies:  No Known Allergies    Isolation:  None   Infection:  None   Code Status:  CPR    Ht:  162.6 cm (64\")   Wt:  79 kg (174 lb 1.6 oz)    Admission Cmt:  None   Principal Problem:  Cellulitis of left ankle [L03.116] More...                 Active Insurance as of 8/8/2018     Primary Coverage     Payor Plan Insurance Group Employer/Plan Group    [employer]ERCIAL AETNA [id number]     Payor Plan Address Payor Plan Phone Number Effective From Effective To    [address] [phone] 6/30/2017     [facility] [id number]       Subscriber Name Subscriber Birth Date Member ID       CYNTHIA RIZO 1985 [id number]                 Emergency Contacts      (Rel.) Home Phone Work Phone Mobile Phone    Eric Oconnor (Brother) [phone] -- --    Dana Oconnor (Other) [phone] -- --               Physician Progress Notes (last 24 hours) (Notes from 8/13/2018  1:44 PM through 8/14/2018  1:44 PM)      Alissa Ramirez APRN at 8/14/2018 10:25 AM     Attestation signed by Guzman Cervantes MD at 8/14/2018  1:28 PM    I have reviewed the documentation above and agree.                      I have personally seen and examined the patient today and discussed overnight interval progress and pertinent issues with nursing " "staff.    Subjective:    Patient resting well this morning.  No issues or complaints.  No fever or diarrhea.  WBC normal.  CRP improving at 2.81.    History taken from: patient chart RN      Vital Signs    /78 (BP Location: Left arm, Patient Position: Lying)   Pulse 78   Temp 98.5 °F (36.9 °C) (Oral)   Resp 16   Ht 162.6 cm (64\")   Wt 79 kg (174 lb 1.6 oz)   SpO2 94%   BMI 29.88 kg/m²      Temp:  [97.6 °F (36.4 °C)-98.5 °F (36.9 °C)] 98.5 °F (36.9 °C)      Intake/Output Summary (Last 24 hours) at 08/14/18 1025  Last data filed at 08/14/18 0330   Gross per 24 hour   Intake             1820 ml   Output                0 ml   Net             1820 ml     Intake & Output (last 3 days)       08/11 0701 - 08/12 0700 08/12 0701 - 08/13 0700 08/13 0701 - 08/14 0700 08/14 0701 - 08/15 0700    P.O. 840 1080 1820     IV Piggyback 100 100      Total Intake(mL/kg) 940 (11.9) 1180 (14.9) 1820 (23)     Net +940 +1180 +1820              Unmeasured Urine Occurrence 5 x 4 x 3 x     Unmeasured Stool Occurrence 0 x 0 x            Physical exam:    General Appearance:    Alert, fever, chills and malaise    Head:    Normocephalic, without obvious abnormality, atraumatic   Eyes:            Lids and lashes normal, conjunctivae and sclerae normal, no   icterus, no pallor, corneas clear, PERRLA   Ears:    Ears appear intact with no abnormalities noted   Throat:   No oral lesions, no thrush, oral mucosa moist   Neck:   No adenopathy, supple, trachea midline, no thyromegaly, no   carotid bruit, no JVD   Back:     No tenderness to percussion or palpation, range of motion   normal   Lungs:     Clear to auscultation,respirations regular, even and           unlabored. No wheezing, no ronchi and no crackles.    Heart:    Regular rhythm and normal rate, normal S1 and S2, no            murmur, no gallop, no rub, no click   Chest Wall:    No abnormalities observed   Abdomen:     Normal bowel sounds, no masses, no organomegaly, soft        " non-tender, non-distended, no guarding, no rebound                tenderness   Rectal:     Deferred   Extremities:   Moves all extremities well, no edema, no cyanosis, no             redness   Pulses:   Pulses palpable and equal bilaterally   Skin:    wound VAC in place, less erythema and improving edema    Lymph nodes:   No palpable adenopathy   Neurologic:   Awake, alert and oriented x 3. Following commands.       Results:      Results from last 7 days  Lab Units 08/14/18  0120 08/13/18  0111 08/12/18  0108 08/11/18  0406 08/10/18  0218 08/09/18  0150 08/08/18  1153   WBC 10*3/mm3 9.36 9.02 9.58 10.43 11.32 18.27* 21.04*     Lab Results   Component Value Date    NEUTROABS 5.38 08/14/2018         Results from last 7 days  Lab Units 08/14/18  0120   CREATININE mg/dL 0.71         Results from last 7 days  Lab Units 08/14/18  0120 08/13/18  0111 08/12/18  0108   CRP mg/dL 2.81* 3.80* 4.81*       Imaging Results (last 24 hours)     ** No results found for the last 24 hours. **            Results Review:    I have personally reviewed laboratory data, culture results, radiology studies and antimicrobial therapy.    Hospital Medications (active)       Dose Frequency Start End    acetaminophen (TYLENOL) tablet 650 mg 650 mg Every 6 Hours PRN 8/9/2018     Sig - Route: Take 2 tablets by mouth Every 6 (Six) Hours As Needed for Mild Pain  or Fever. - Oral    cefTRIAXone (ROCEPHIN) 2 g/100 mL 0.9% NS VTB (DENISHA) 2 g Every 24 Hours 8/8/2018 8/13/2018    Sig - Route: Infuse 100 mL into a venous catheter Daily. - Intravenous    dextrose (D50W) solution 25 g 25 g Every 15 Minutes PRN 8/8/2018     Sig - Route: Infuse 50 mL into a venous catheter Every 15 (Fifteen) Minutes As Needed for Low Blood Sugar (Blood Sugar Less Than 70). - Intravenous    Cosign for Ordering: Accepted by Manolo Tripathi MD on 8/8/2018  4:16 PM    dextrose (D50W) solution 25 g 25 g Every 15 Minutes PRN 8/9/2018     Sig - Route: Infuse 50 mL into a venous  catheter Every 15 (Fifteen) Minutes As Needed for Low Blood Sugar (Blood Sugar Less Than 70). - Intravenous    Cosign for Ordering: Accepted by Manolo Tripathi MD on 8/9/2018 12:19 PM    dextrose (GLUTOSE) oral gel 15 g 15 g Every 15 Minutes PRN 8/8/2018     Sig - Route: Take 15 g by mouth Every 15 (Fifteen) Minutes As Needed for Low Blood Sugar (Blood sugar less than 70). - Oral    Cosign for Ordering: Accepted by Manolo Tripathi MD on 8/8/2018  4:16 PM    dextrose (GLUTOSE) oral gel 15 g 15 g Every 15 Minutes PRN 8/9/2018     Sig - Route: Take 15 g by mouth Every 15 (Fifteen) Minutes As Needed for Low Blood Sugar (Blood sugar less than 70). - Oral    Cosign for Ordering: Accepted by Manolo Tripathi MD on 8/9/2018 12:19 PM    glucagon (human recombinant) (GLUCAGEN DIAGNOSTIC) injection 1 mg 1 mg As Needed 8/8/2018     Sig - Route: Inject 1 mg under the skin into the appropriate area as directed As Needed (Blood Glucose Less Than 70). - Subcutaneous    Cosign for Ordering: Accepted by Manolo Tripathi MD on 8/8/2018  4:16 PM    glucagon (human recombinant) (GLUCAGEN DIAGNOSTIC) injection 1 mg 1 mg As Needed 8/9/2018     Sig - Route: Inject 1 mg under the skin into the appropriate area as directed As Needed (Blood Glucose Less Than 70). - Subcutaneous    Cosign for Ordering: Accepted by Manolo Tripathi MD on 8/9/2018 12:19 PM    heparin (porcine) 5000 UNIT/ML injection 5,000 Units 5,000 Units Every 12 Hours Scheduled 8/8/2018     Sig - Route: Inject 1 mL under the skin into the appropriate area as directed Every 12 (Twelve) Hours. - Subcutaneous    Cosign for Ordering: Accepted by Manolo Tripathi MD on 8/8/2018  4:16 PM    insulin aspart (novoLOG) injection 0-9 Units 0-9 Units 4 Times Daily Before Meals & Nightly 8/9/2018     Sig - Route: Inject 0-9 Units under the skin into the appropriate area as directed 4 (Four) Times a Day Before Meals & at Bedtime. - Subcutaneous    Cosign for Ordering: Accepted by Manolo Tripathi  MD CHAU on 8/9/2018 12:19 PM    insulin detemir (LEVEMIR) injection 10 Units 10 Units Nightly 8/9/2018     Sig - Route: Inject 10 Units under the skin into the appropriate area as directed Every Night. - Subcutaneous    Cosign for Ordering: Accepted by Manolo Tripathi MD on 8/9/2018 12:19 PM    lactobacillus acidophilus (RISAQUAD) capsule 1 capsule 1 capsule Daily 8/9/2018     Sig - Route: Take 1 capsule by mouth Daily. - Oral    nitroglycerin (NITROSTAT) SL tablet 0.4 mg 0.4 mg Every 5 Minutes PRN 8/8/2018     Sig - Route: Place 1 tablet under the tongue Every 5 (Five) Minutes As Needed for Chest Pain (if systolic BP greater than 100 mm/Hg.). - Sublingual    Cosign for Ordering: Accepted by Manolo Tripathi MD on 8/8/2018  4:16 PM    sodium chloride 0.9 % flush 1-10 mL 1-10 mL As Needed 8/8/2018     Sig - Route: Infuse 1-10 mL into a venous catheter As Needed for Line Care. - Intravenous    Cosign for Ordering: Accepted by Manolo Tripathi MD on 8/8/2018  4:16 PM    sodium chloride 0.9 % flush 10 mL 10 mL As Needed 8/8/2018     Sig - Route: Infuse 10 mL into a venous catheter As Needed for Line Care. - Intravenous    Cosign for Ordering: Accepted by Placido Holland MD on 8/8/2018  1:14 PM    sodium chloride 0.9 % infusion 125 mL/hr Continuous 8/8/2018     Sig - Route: Infuse 125 mL/hr into a venous catheter Continuous. - Intravenous    Cosign for Ordering: Accepted by Manolo Tripathi MD on 8/8/2018  4:16 PM    vancomycin (VANCOCIN) 1,250 mg in sodium chloride 0.9 % 250 mL IVPB 1,250 mg Every 12 Hours 8/9/2018 8/15/2018    Sig - Route: Infuse 1,250 mg into a venous catheter Every 12 (Twelve) Hours. - Intravenous    vancomycin (VANCOCIN) 1,500 mg in sodium chloride 0.9 % 500 mL IVPB 20 mg/kg × 79.4 kg Once 8/8/2018 8/8/2018    Sig - Route: Infuse 1,500 mg into a venous catheter 1 (One) Time. - Intravenous    Cosign for Ordering: Accepted by Placido Holland MD on 8/8/2018  1:14 PM    fentaNYL citrate (PF)  (SUBLIMAZE) injection (Discontinued)  As Needed 8/8/2018 8/8/2018    Sig - Route: Infuse  into a venous catheter As Needed for Severe Pain . - Intravenous    Reason for Discontinue: Anesthesia Stop    lactated ringers infusion (Discontinued) 125 mL/hr Continuous 8/8/2018 8/9/2018    Sig - Route: Infuse 125 mL/hr into a venous catheter Continuous. - Intravenous    metFORMIN (GLUCOPHAGE) tablet 500 mg (Discontinued) 500 mg 2 Times Daily With Meals 8/9/2018 8/9/2018    Sig - Route: Take 1 tablet by mouth 2 (Two) Times a Day With Meals. - Oral    Cosign for Ordering: Accepted by Manolo Tripathi MD on 8/9/2018 12:19 PM    midazolam (VERSED) injection (Discontinued)  As Needed 8/8/2018 8/8/2018    Sig - Route: Infuse  into a venous catheter As Needed. - Intravenous    Reason for Discontinue: Anesthesia Stop    ondansetron (ZOFRAN) injection (Discontinued)  As Needed 8/8/2018 8/8/2018    Sig - Route: Infuse  into a venous catheter As Needed for Nausea or Vomiting. - Intravenous    Reason for Discontinue: Anesthesia Stop    Pharmacy Consult - Pharmacy to dose (Discontinued)  Continuous PRN 8/8/2018 8/8/2018    Sig - Route: Continuous As Needed for Consult. - Does not apply    Reason for Discontinue: Dose adjustment    Cosign for Ordering: Accepted by Manolo Tripathi MD on 8/8/2018  4:16 PM    Pharmacy Consult - Pharmacy to dose (Discontinued)  Continuous PRN 8/8/2018 8/8/2018    Sig - Route: Continuous As Needed for Consult. - Does not apply    Reason for Discontinue: Dose adjustment    Cosign for Ordering: Accepted by Manolo Tripathi MD on 8/8/2018  4:16 PM    piperacillin-tazobactam (ZOSYN) 3.375 g/100 mL 0.9% NS IVPB (mbp) (Discontinued) 3.375 g Every 8 Hours 8/8/2018 8/8/2018    Sig - Route: Infuse 100 mL into a venous catheter Every 8 (Eight) Hours. - Intravenous    propofol (DIPRIVAN) infusion 10 mg/mL 100 mL (Discontinued)  Continuous PRN 8/8/2018 8/8/2018    Sig - Route: Infuse  into a venous catheter Continuous As  Needed. - Intravenous    Reason for Discontinue: Anesthesia Stop    Propofol (DIPRIVAN) injection (Discontinued)  As Needed 8/8/2018 8/8/2018    Sig - Route: Infuse  into a venous catheter As Needed. - Intravenous    Reason for Discontinue: Anesthesia Stop    sodium chloride 0.9 % flush 1-10 mL (Discontinued) 1-10 mL As Needed 8/8/2018 8/8/2018    Sig - Route: Infuse 1-10 mL into a venous catheter As Needed for Line Care. - Intravenous    Reason for Discontinue: Patient Transfer    sodium chloride 1,000 mL with vancomycin 1,000 mg irrigation (Discontinued)  As Needed 8/8/2018 8/8/2018    Sig: As Needed.    Reason for Discontinue: Patient Discharge    sterile water irrigation solution (Discontinued)  As Needed 8/8/2018 8/8/2018    Sig: As Needed.    Reason for Discontinue: Patient Discharge            Cultures:    Blood Culture   Date Value Ref Range Status   08/08/2018 No growth at 24 hours  Preliminary   08/08/2018 No growth at 24 hours  Preliminary     Urine Culture   Date Value Ref Range Status   08/08/2018 Normal Urogenital Kandace  Preliminary           Assessment/Plan     ASSESSMENT:    1.  Septic arthritis versus severe cellulitis  2.  Lower extremity abscess    PLAN:    Patient resting well this morning.  No issues or complaints.  No fever or diarrhea.  WBC normal.  CRP improving at 2.81.    Left ankle culture from 8/8/18 finalized as MSSA.  Repeat left ankle culture finalized as MSSA. Urine culture from 8/8/18 finalized as 50-60,000 colonies of normal urogenital kandace.    Dr. Artis reported no evidence of contamination of the ankle joint, or osteomyelitis.    Blood cultures show no growth.      Differential diagnosis could be gonococcal arthritis  or inflammatory reactive arthritis.      Based on finalization of left ankle wound culture antibiotic therapy was changed to high-dose Rocephin monotherapy 2 g IV every 24 hours until discharge.    Recommend to  de-escalate Rocephin to Keflex 500 mg 1 by mouth 3  times a day upon discharge through 8/22/18.    CRP ordered for a.m.        Current antimicrobials:     Rocephin 2 g IV Q24H       Patient's findings and recommendations were discussed with patient, nursing staff, primary care team and consulting provider    Code Status:   Code Status and Medical Interventions:   Ordered at: 08/08/18 1559     Code Status:    CPR     Medical Interventions (Level of Support Prior to Arrest):    Full     Comments:    Any long term medical decisions to be made by her brother, Eric Oconnor who she reports to be her next of kin, in the event she can't decide for herself.       Alissa Ramirez, RADHA  08/14/18  10:25 AM         Electronically signed by Guzman Cervantes MD at 8/14/2018  1:28 PM       Consult Notes (last 24 hours) (Notes from 8/13/2018  1:44 PM through 8/14/2018  1:44 PM)     No notes of this type exist for this encounter.        Only active medications are shown.   Scheduled Meds Sorted by Name   for Cynthia Ríos as of 08/14/18 1344    Legend:                           Inactive     Active     Linked               Medications 08/14/18 08/15/18 08/16/18 08/17/18 08/18/18 08/19/18 08/20/18   cefTRIAXone (ROCEPHIN) 2 g/100 mL 0.9% NS VTB (DENISHA)  Dose: 2 g  Freq: Every 24 Hours Route: IV  Indications of Use: SKIN AND SOFT TISSUE INFECTION  Start: 08/08/18 1700 End: 08/19/18 1026    Admin Instructions:   Caution: Look alike/sound alike drug alert. Activate vial before using.    1700        1700        1700        1700        1700        1026-D/C'd       famotidine (PEPCID) tablet 20 mg  Dose: 20 mg  Freq: Daily Route: PO  Start: 08/10/18 1000    0905        0900        0900        0900        0900        0900        0900          ferrous sulfate tablet 325 mg  Dose: 325 mg  Freq: Daily With Breakfast Route: PO  Start: 08/11/18 0900    Admin Instructions:   Swallow whole. Do not crush, split, or chew. Take with food if GI upset occurs.    0905        0800        0800         0800        0800        0800        0800          heparin (porcine) 5000 UNIT/ML injection 5,000 Units  Dose: 5,000 Units  Freq: Every 12 Hours Scheduled Route: SC  Indications Comment: Prophylaxis of Venous Thromboembolism  Start: 08/08/18 2100 0905 2100 0900 2100 0900 2100 0900 2100 0900 2100 0900 2100 0900 2100          insulin aspart (novoLOG) injection 0-9 Units  Dose: 0-9 Units  Freq: 4 Times Daily Before Meals & Nightly Route: SC  Start: 08/09/18 1130    Admin Instructions:   Correction - Moderate Dose.  40-60 units/day total insulin dose or average weight, on oral agents    Blood glucose 150-199 mg/dL - 2 units  Blood glucose 200-249 mg/dL - 4 units  Blood glucose 250-299 mg/dL - 6 units  Blood glucose 300-349 mg/dL - 7 units  Blood glucose 350-400 mg/dL - 8 units  Blood glucose greater than 400 mg/dL - 9 units and call provider      0904       1213       1730       2100        0730       1130       1730       2100        0730       1130       1730       2100        0730       1130       1730       2100        0730       1130       1730       2100        0730       1130       1730       2100        0730       1130       1730       2100          insulin aspart (novoLOG) injection 10 Units  Dose: 10 Units  Freq: 3 Times Daily With Meals Route: SC  Start: 08/12/18 1200    Admin Instructions:       0904       1213       1800        0800       1200       1800        0800       1200       1800        0800       1200       1800        0800       1200       1800        0800       1200       1800        0800       1200       1800          insulin detemir (LEVEMIR) injection 30 Units  Dose: 30 Units  Freq: Nightly Route: SC  Start: 08/13/18 2100    Admin Instructions:       2100 2100 2100 2100 2100 2100 2100          lactobacillus acidophilus (RISAQUAD) capsule 1 capsule  Dose: 1  capsule  Freq: Daily Route: PO  Start: 08/09/18 1230    0905        0900        0900        0900        0900        0900        0900          multivitamin (DAILY MARY) tablet 1 tablet  Dose: 1 tablet  Freq: Daily Route: PO  Start: 08/11/18 1500    0905        0900        0900        0900        0900        0900        0900          Medications 08/14/18 08/15/18 08/16/18 08/17/18 08/18/18 08/19/18 08/20/18       Continuous Meds Sorted by Name   for Cynthia Ríos as of 08/14/18 1344    Legend:                           Inactive     Active     Linked               Medications 08/14/18 08/15/18 08/16/18 08/17/18 08/18/18 08/19/18 08/20/18       PRN Meds Sorted by Name   for Cynthia Ríos as of 08/14/18 1344    Legend:                           Inactive     Active     Linked               Medications 08/14/18 08/15/18 08/16/18 08/17/18 08/18/18 08/19/18 08/20/18   dextrose (D50W) solution 25 g  Dose: 25 g  Freq: Every 15 Minutes PRN Route: IV  PRN Reason: Low Blood Sugar  PRN Comment: Blood Sugar Less Than 70  Start: 08/09/18 0751    Admin Instructions:   Blood sugar less than 70; patient has IV access - Unresponsive, NPO or Unable To Safely Swallow             dextrose (D50W) solution 25 g  Dose: 25 g  Freq: Every 15 Minutes PRN Route: IV  PRN Reason: Low Blood Sugar  PRN Comment: Blood Sugar Less Than 70  Start: 08/08/18 1500    Admin Instructions:   Blood sugar less than 70; patient has IV access - Unresponsive, NPO or Unable To Safely Swallow             dextrose (GLUTOSE) oral gel 15 g  Dose: 15 g  Freq: Every 15 Minutes PRN Route: PO  PRN Reason: Low Blood Sugar  PRN Comment: Blood sugar less than 70  Start: 08/09/18 0751    Admin Instructions:   BS<70, Patient Alert, Is not NPO, Can safely swallow.             dextrose (GLUTOSE) oral gel 15 g  Dose: 15 g  Freq: Every 15 Minutes PRN Route: PO  PRN Reason: Low Blood Sugar  PRN Comment: Blood sugar less than 70  Start: 08/08/18 1500    Admin Instructions:   BS<70,  Patient Alert, Is not NPO, Can safely swallow.             glucagon (human recombinant) (GLUCAGEN DIAGNOSTIC) injection 1 mg  Dose: 1 mg  Freq: As Needed Route: SC  PRN Comment: Blood Glucose Less Than 70  Start: 08/09/18 0751    Admin Instructions:   Blood Glucose Less Than 70 - Patient Without IV Access - Unresponsive, NPO or Unable To Safely Swallow             glucagon (human recombinant) (GLUCAGEN DIAGNOSTIC) injection 1 mg  Dose: 1 mg  Freq: As Needed Route: SC  PRN Comment: Blood Glucose Less Than 70  Start: 08/08/18 1500    Admin Instructions:   Blood Glucose Less Than 70 - Patient Without IV Access - Unresponsive, NPO or Unable To Safely Swallow             ibuprofen (ADVIL,MOTRIN) tablet 200 mg  Dose: 200 mg  Freq: Every 4 Hours PRN Route: PO  PRN Reason: Mild Pain   Start: 08/10/18 0832    Admin Instructions:   If given for pain, use the following pain scale:  Mild Pain = Pain Score of 1-3, CPOT 1-2  Moderate Pain = Pain Score of 4-6, CPOT 3-4  Severe Pain = Pain Score of 7-10, CPOT 5-8             Magnesium Sulfate 2 gram Bolus, followed by 8 gram infusion (total Mg dose 10 grams)- Mg less than or equal to 1mg/dL  Dose: 2 g  Freq: As Needed Route: IV  PRN Comment: See Administration Instructions  Start: 08/10/18 0749    Admin Instructions:   Mg less than or equal to 1mg/dL. Give 2 gm over 30 minutes as bolus, then infuse 2 gm over 2 hours for 4 doses (8 grams) for total dose of 10 grams.  Recheck Mg levels in the AM.             Or  Magnesium Sulfate 2 gram / 50mL Infusion (GIVE X 3 BAGS TO EQUAL 6GM TOTAL DOSE) - Mg 1.1 - 1.5 mg/dl  Dose: 2 g  Freq: As Needed Route: IV  PRN Comment: See Administration Instructions  Start: 08/10/18 0749    Admin Instructions:   Mg 1.1 -1.5 mg/dL. Infuse 2 grams over 2 hours for 3 doses (for a total Mg dose of 6 grams).  Recheck Mg level in the AM.             Or  Magnesium Sulfate 4 gram infusion- Mg 1.6-1.9 mg/dL  Dose: 4 g  Freq: As Needed Route: IV  PRN Comment: See  Administration Instructions  Start: 08/10/18 0749    Admin Instructions:   Mg 1.6-1.9 mg/dL. Recheck Mg level in the AM.             morphine injection 2 mg  Dose: 2 mg  Freq: Every 4 Hours PRN Route: IV  PRN Reason: Severe Pain   Start: 08/10/18 0949 End: 08/20/18 0948    Admin Instructions:   Hold for SBP less than 110.  Hold for sedation/respiratory distress.  If given for pain, use the following pain scale:  Mild Pain = Pain Score of 1-3, CPOT 1-2  Moderate Pain = Pain Score of 4-6, CPOT 3-4  Severe Pain = Pain Score of 7-10, CPOT 5-8          0948-D/C'd      nitroglycerin (NITROSTAT) SL tablet 0.4 mg  Dose: 0.4 mg  Freq: Every 5 Minutes PRN Route: SL  PRN Reason: Chest Pain  PRN Comment: if systolic BP greater than 100 mm/Hg.  Start: 08/08/18 1407    Admin Instructions:   If pain unrelieved after 3 doses, notify MD.             potassium chloride (MICRO-K) CR capsule 40 mEq  Dose: 40 mEq  Freq: As Needed Route: PO  PRN Comment: potassium replacement.  see admin instructions  Start: 08/10/18 0749    Admin Instructions:   Potassium replacement    Oral (may give capsule or powder packet)  If K+ less than or equal to 3.1 give KCl 40 mEq q4h x 3 doses  If K+ 3.2-3.6 give KCl 40 mEq q4h x 2 doses    Peripheral IV  If K+ less than or equal to 3.1 give KCl 10 mEq/100 mL NS IV q1h x 6 doses  If K+ 3.2-3.6 give KCl 10 mEq/100 mL NS q1h x 4 doses    Central Line  If K+ less than or equal to 3.1 give KCl 20 mEq/50 mL NS IV q1h x 3 doses  If K+ 3.2-3.6 give KCl 20 mEq/50 mL NS q1h x 2 doses    Check potassium 4 hours after last dose given.  Check magnesium if K stays low after replacement.  DO NOT GIVE if CrCl is less than 30 mL/minute or urine output is less than 30 mL/hr             Or  potassium chloride (KLOR-CON) packet 40 mEq  Dose: 40 mEq  Freq: As Needed Route: PO  PRN Comment: potassium replacement, see admin instructions  Start: 08/10/18 0749    Admin Instructions:   Potassium replacement    Oral (may give capsule  or powder packet)  If K+ less than or equal to 3.1 give KCl 40 mEq q4h x 3 doses  If K+ 3.2-3.6 give KCl 40 mEq q4h x 2 doses    Peripheral IV  If K+ less than or equal to 3.1 give KCl 10 mEq/100 mL NS IV q1h x 6 doses  If K+ 3.2-3.6 give KCl 10 mEq/100 mL NS q1h x 4 doses    Central Line  If K+ less than or equal to 3.1 give KCl 20 mEq/50 mL NS IV q1h x 3 doses  If K+ 3.2-3.6 give KCl 20 mEq/50 mL NS q1h x 2 doses    Check potassium 4 hours after last dose given.  Check magnesium if K stays low after replacement.  DO NOT GIVE if CrCl is less than 30 mL/minute or urine output is less than 30 mL/hr             Or  potassium chloride 10 mEq in 100 mL IVPB  Dose: 10 mEq  Freq: Every 1 Hour PRN Route: IV  PRN Comment: potassium protocol PERIPHERAL - see admin instructions  Start: 08/10/18 0749    Admin Instructions:   Potassium replacement - patient NPO or cannot tolerate oral potassium    Peripheral or Central IV  If K+ less than or equal to 3.1 give KCl 10 mEq/100 mL NS IV q1h x 6 doses  If K+ 3.2-3.6 give KCl 10 mEq/100 mL NS q1h x 4 doses    Check potassium 4 hours after last dose given.  Check magnesium if K stays low after replacement.  DO NOT GIVE if CrCl is less than 30 mL/minute or urine output is less than 30 mL/hr. Rates greater than 10mEq/hr require ECG monitoring.             sodium chloride 0.9 % flush 1-10 mL  Dose: 1-10 mL  Freq: As Needed Route: IV  PRN Reason: Line Care  Start: 08/08/18 1407             sodium chloride 0.9 % flush 10 mL  Dose: 10 mL  Freq: As Needed Route: IV  PRN Reason: Line Care  Start: 08/08/18 1121             Medications 08/14/18 08/15/18 08/16/18 08/17/18 08/18/18 08/19/18 08/20/18

## 2018-08-14 NOTE — PLAN OF CARE
Problem: Patient Care Overview  Goal: Plan of Care Review  Outcome: Ongoing (interventions implemented as appropriate)   08/13/18 0957 08/13/18 2000   Coping/Psychosocial   Plan of Care Reviewed With --  patient   Plan of Care Review   Progress no change --    OTHER   Outcome Summary patient is anxious to go home. no pain in leg/wound area per patient reported.  --      Goal: Individualization and Mutuality  Outcome: Ongoing (interventions implemented as appropriate)   08/08/18 1903   Mutuality/Individual Preferences   What Information Would Help Us Give You More Personalized Care? none   Individualization   Patient Specific Preferences none       Problem: Pain, Acute (Adult)  Goal: Acceptable Pain Control/Comfort Level  Outcome: Ongoing (interventions implemented as appropriate)   08/13/18 0957   Pain, Acute (Adult)   Acceptable Pain Control/Comfort Level making progress toward outcome       Problem: Wound (Includes Pressure Injury) (Adult)  Goal: Signs and Symptoms of Listed Potential Problems Will be Absent, Minimized or Managed (Wound)  Outcome: Ongoing (interventions implemented as appropriate)   08/13/18 0957   Goal/Outcome Evaluation   Problems Assessed (Wound) all   Problems Present (Wound) infection       Problem: Skin Injury Risk (Adult)  Goal: Skin Health and Integrity  Outcome: Ongoing (interventions implemented as appropriate)   08/13/18 0957   Skin Injury Risk (Adult)   Skin Health and Integrity making progress toward outcome       Problem: Diabetes, Type 2 (Adult)  Goal: Signs and Symptoms of Listed Potential Problems Will be Absent, Minimized or Managed (Diabetes, Type 2)  Outcome: Ongoing (interventions implemented as appropriate)   08/13/18 0957   Goal/Outcome Evaluation   Problems Assessed (Type 2 Diabetes) all   Problems Present (Type 2 Diabetes) situational response

## 2018-08-14 NOTE — PLAN OF CARE
Problem: Patient Care Overview  Goal: Plan of Care Review  Outcome: Ongoing (interventions implemented as appropriate)   08/14/18 1455   Coping/Psychosocial   Plan of Care Reviewed With patient   Plan of Care Review   Progress no change       Problem: Pain, Acute (Adult)  Goal: Acceptable Pain Control/Comfort Level  Outcome: Ongoing (interventions implemented as appropriate)      Problem: Wound (Includes Pressure Injury) (Adult)  Goal: Signs and Symptoms of Listed Potential Problems Will be Absent, Minimized or Managed (Wound)  Outcome: Ongoing (interventions implemented as appropriate)      Problem: Skin Injury Risk (Adult)  Goal: Skin Health and Integrity  Outcome: Ongoing (interventions implemented as appropriate)      Problem: Diabetes, Type 2 (Adult)  Goal: Signs and Symptoms of Listed Potential Problems Will be Absent, Minimized or Managed (Diabetes, Type 2)  Outcome: Ongoing (interventions implemented as appropriate)

## 2018-08-14 NOTE — PROGRESS NOTES
"  I have personally seen and examined the patient today and discussed overnight interval progress and pertinent issues with nursing staff.    Subjective:    Patient resting well this morning.  No issues or complaints.  No fever or diarrhea.  WBC normal.  CRP improving at 2.81.    History taken from: patient chart RN      Vital Signs    /78 (BP Location: Left arm, Patient Position: Lying)   Pulse 78   Temp 98.5 °F (36.9 °C) (Oral)   Resp 16   Ht 162.6 cm (64\")   Wt 79 kg (174 lb 1.6 oz)   SpO2 94%   BMI 29.88 kg/m²     Temp:  [97.6 °F (36.4 °C)-98.5 °F (36.9 °C)] 98.5 °F (36.9 °C)      Intake/Output Summary (Last 24 hours) at 08/14/18 1025  Last data filed at 08/14/18 0330   Gross per 24 hour   Intake             1820 ml   Output                0 ml   Net             1820 ml     Intake & Output (last 3 days)       08/11 0701 - 08/12 0700 08/12 0701 - 08/13 0700 08/13 0701 - 08/14 0700 08/14 0701 - 08/15 0700    P.O. 840 1080 1820     IV Piggyback 100 100      Total Intake(mL/kg) 940 (11.9) 1180 (14.9) 1820 (23)     Net +940 +1180 +1820              Unmeasured Urine Occurrence 5 x 4 x 3 x     Unmeasured Stool Occurrence 0 x 0 x            Physical exam:    General Appearance:    Alert, fever, chills and malaise    Head:    Normocephalic, without obvious abnormality, atraumatic   Eyes:            Lids and lashes normal, conjunctivae and sclerae normal, no   icterus, no pallor, corneas clear, PERRLA   Ears:    Ears appear intact with no abnormalities noted   Throat:   No oral lesions, no thrush, oral mucosa moist   Neck:   No adenopathy, supple, trachea midline, no thyromegaly, no   carotid bruit, no JVD   Back:     No tenderness to percussion or palpation, range of motion   normal   Lungs:     Clear to auscultation,respirations regular, even and           unlabored. No wheezing, no ronchi and no crackles.    Heart:    Regular rhythm and normal rate, normal S1 and S2, no            murmur, no gallop, no " rub, no click   Chest Wall:    No abnormalities observed   Abdomen:     Normal bowel sounds, no masses, no organomegaly, soft        non-tender, non-distended, no guarding, no rebound                tenderness   Rectal:     Deferred   Extremities:   Moves all extremities well, no edema, no cyanosis, no             redness   Pulses:   Pulses palpable and equal bilaterally   Skin:    wound VAC in place, less erythema and improving edema    Lymph nodes:   No palpable adenopathy   Neurologic:   Awake, alert and oriented x 3. Following commands.       Results:      Results from last 7 days  Lab Units 08/14/18  0120 08/13/18  0111 08/12/18  0108 08/11/18  0406 08/10/18  0218 08/09/18  0150 08/08/18  1153   WBC 10*3/mm3 9.36 9.02 9.58 10.43 11.32 18.27* 21.04*     Lab Results   Component Value Date    NEUTROABS 5.38 08/14/2018         Results from last 7 days  Lab Units 08/14/18  0120   CREATININE mg/dL 0.71         Results from last 7 days  Lab Units 08/14/18  0120 08/13/18  0111 08/12/18  0108   CRP mg/dL 2.81* 3.80* 4.81*       Imaging Results (last 24 hours)     ** No results found for the last 24 hours. **            Results Review:    I have personally reviewed laboratory data, culture results, radiology studies and antimicrobial therapy.    Hospital Medications (active)       Dose Frequency Start End    acetaminophen (TYLENOL) tablet 650 mg 650 mg Every 6 Hours PRN 8/9/2018     Sig - Route: Take 2 tablets by mouth Every 6 (Six) Hours As Needed for Mild Pain  or Fever. - Oral    cefTRIAXone (ROCEPHIN) 2 g/100 mL 0.9% NS VTB (DENISHA) 2 g Every 24 Hours 8/8/2018 8/13/2018    Sig - Route: Infuse 100 mL into a venous catheter Daily. - Intravenous    dextrose (D50W) solution 25 g 25 g Every 15 Minutes PRN 8/8/2018     Sig - Route: Infuse 50 mL into a venous catheter Every 15 (Fifteen) Minutes As Needed for Low Blood Sugar (Blood Sugar Less Than 70). - Intravenous    Cosign for Ordering: Accepted by Manolo Tripathi MD on  8/8/2018  4:16 PM    dextrose (D50W) solution 25 g 25 g Every 15 Minutes PRN 8/9/2018     Sig - Route: Infuse 50 mL into a venous catheter Every 15 (Fifteen) Minutes As Needed for Low Blood Sugar (Blood Sugar Less Than 70). - Intravenous    Cosign for Ordering: Accepted by Manolo Tripathi MD on 8/9/2018 12:19 PM    dextrose (GLUTOSE) oral gel 15 g 15 g Every 15 Minutes PRN 8/8/2018     Sig - Route: Take 15 g by mouth Every 15 (Fifteen) Minutes As Needed for Low Blood Sugar (Blood sugar less than 70). - Oral    Cosign for Ordering: Accepted by Manolo Tripathi MD on 8/8/2018  4:16 PM    dextrose (GLUTOSE) oral gel 15 g 15 g Every 15 Minutes PRN 8/9/2018     Sig - Route: Take 15 g by mouth Every 15 (Fifteen) Minutes As Needed for Low Blood Sugar (Blood sugar less than 70). - Oral    Cosign for Ordering: Accepted by Manolo Tripathi MD on 8/9/2018 12:19 PM    glucagon (human recombinant) (GLUCAGEN DIAGNOSTIC) injection 1 mg 1 mg As Needed 8/8/2018     Sig - Route: Inject 1 mg under the skin into the appropriate area as directed As Needed (Blood Glucose Less Than 70). - Subcutaneous    Cosign for Ordering: Accepted by Manolo Tripathi MD on 8/8/2018  4:16 PM    glucagon (human recombinant) (GLUCAGEN DIAGNOSTIC) injection 1 mg 1 mg As Needed 8/9/2018     Sig - Route: Inject 1 mg under the skin into the appropriate area as directed As Needed (Blood Glucose Less Than 70). - Subcutaneous    Cosign for Ordering: Accepted by Manolo Tripathi MD on 8/9/2018 12:19 PM    heparin (porcine) 5000 UNIT/ML injection 5,000 Units 5,000 Units Every 12 Hours Scheduled 8/8/2018     Sig - Route: Inject 1 mL under the skin into the appropriate area as directed Every 12 (Twelve) Hours. - Subcutaneous    Cosign for Ordering: Accepted by Manolo Tripathi MD on 8/8/2018  4:16 PM    insulin aspart (novoLOG) injection 0-9 Units 0-9 Units 4 Times Daily Before Meals & Nightly 8/9/2018     Sig - Route: Inject 0-9 Units under the skin into the appropriate  area as directed 4 (Four) Times a Day Before Meals & at Bedtime. - Subcutaneous    Cosign for Ordering: Accepted by Manolo Tripathi MD on 8/9/2018 12:19 PM    insulin detemir (LEVEMIR) injection 10 Units 10 Units Nightly 8/9/2018     Sig - Route: Inject 10 Units under the skin into the appropriate area as directed Every Night. - Subcutaneous    Cosign for Ordering: Accepted by Manolo Tripathi MD on 8/9/2018 12:19 PM    lactobacillus acidophilus (RISAQUAD) capsule 1 capsule 1 capsule Daily 8/9/2018     Sig - Route: Take 1 capsule by mouth Daily. - Oral    nitroglycerin (NITROSTAT) SL tablet 0.4 mg 0.4 mg Every 5 Minutes PRN 8/8/2018     Sig - Route: Place 1 tablet under the tongue Every 5 (Five) Minutes As Needed for Chest Pain (if systolic BP greater than 100 mm/Hg.). - Sublingual    Cosign for Ordering: Accepted by Manolo Tripathi MD on 8/8/2018  4:16 PM    sodium chloride 0.9 % flush 1-10 mL 1-10 mL As Needed 8/8/2018     Sig - Route: Infuse 1-10 mL into a venous catheter As Needed for Line Care. - Intravenous    Cosign for Ordering: Accepted by Manolo Tripathi MD on 8/8/2018  4:16 PM    sodium chloride 0.9 % flush 10 mL 10 mL As Needed 8/8/2018     Sig - Route: Infuse 10 mL into a venous catheter As Needed for Line Care. - Intravenous    Cosign for Ordering: Accepted by Placido Holland MD on 8/8/2018  1:14 PM    sodium chloride 0.9 % infusion 125 mL/hr Continuous 8/8/2018     Sig - Route: Infuse 125 mL/hr into a venous catheter Continuous. - Intravenous    Cosign for Ordering: Accepted by Manolo Tripathi MD on 8/8/2018  4:16 PM    vancomycin (VANCOCIN) 1,250 mg in sodium chloride 0.9 % 250 mL IVPB 1,250 mg Every 12 Hours 8/9/2018 8/15/2018    Sig - Route: Infuse 1,250 mg into a venous catheter Every 12 (Twelve) Hours. - Intravenous    vancomycin (VANCOCIN) 1,500 mg in sodium chloride 0.9 % 500 mL IVPB 20 mg/kg × 79.4 kg Once 8/8/2018 8/8/2018    Sig - Route: Infuse 1,500 mg into a venous catheter 1 (One)  Time. - Intravenous    Cosign for Ordering: Accepted by Placido Holland MD on 8/8/2018  1:14 PM    fentaNYL citrate (PF) (SUBLIMAZE) injection (Discontinued)  As Needed 8/8/2018 8/8/2018    Sig - Route: Infuse  into a venous catheter As Needed for Severe Pain . - Intravenous    Reason for Discontinue: Anesthesia Stop    lactated ringers infusion (Discontinued) 125 mL/hr Continuous 8/8/2018 8/9/2018    Sig - Route: Infuse 125 mL/hr into a venous catheter Continuous. - Intravenous    metFORMIN (GLUCOPHAGE) tablet 500 mg (Discontinued) 500 mg 2 Times Daily With Meals 8/9/2018 8/9/2018    Sig - Route: Take 1 tablet by mouth 2 (Two) Times a Day With Meals. - Oral    Cosign for Ordering: Accepted by Manolo Tripathi MD on 8/9/2018 12:19 PM    midazolam (VERSED) injection (Discontinued)  As Needed 8/8/2018 8/8/2018    Sig - Route: Infuse  into a venous catheter As Needed. - Intravenous    Reason for Discontinue: Anesthesia Stop    ondansetron (ZOFRAN) injection (Discontinued)  As Needed 8/8/2018 8/8/2018    Sig - Route: Infuse  into a venous catheter As Needed for Nausea or Vomiting. - Intravenous    Reason for Discontinue: Anesthesia Stop    Pharmacy Consult - Pharmacy to dose (Discontinued)  Continuous PRN 8/8/2018 8/8/2018    Sig - Route: Continuous As Needed for Consult. - Does not apply    Reason for Discontinue: Dose adjustment    Cosign for Ordering: Accepted by Manolo Tripathi MD on 8/8/2018  4:16 PM    Pharmacy Consult - Pharmacy to dose (Discontinued)  Continuous PRN 8/8/2018 8/8/2018    Sig - Route: Continuous As Needed for Consult. - Does not apply    Reason for Discontinue: Dose adjustment    Cosign for Ordering: Accepted by Manolo Tripathi MD on 8/8/2018  4:16 PM    piperacillin-tazobactam (ZOSYN) 3.375 g/100 mL 0.9% NS IVPB (mbp) (Discontinued) 3.375 g Every 8 Hours 8/8/2018 8/8/2018    Sig - Route: Infuse 100 mL into a venous catheter Every 8 (Eight) Hours. - Intravenous    propofol (DIPRIVAN) infusion  10 mg/mL 100 mL (Discontinued)  Continuous PRN 8/8/2018 8/8/2018    Sig - Route: Infuse  into a venous catheter Continuous As Needed. - Intravenous    Reason for Discontinue: Anesthesia Stop    Propofol (DIPRIVAN) injection (Discontinued)  As Needed 8/8/2018 8/8/2018    Sig - Route: Infuse  into a venous catheter As Needed. - Intravenous    Reason for Discontinue: Anesthesia Stop    sodium chloride 0.9 % flush 1-10 mL (Discontinued) 1-10 mL As Needed 8/8/2018 8/8/2018    Sig - Route: Infuse 1-10 mL into a venous catheter As Needed for Line Care. - Intravenous    Reason for Discontinue: Patient Transfer    sodium chloride 1,000 mL with vancomycin 1,000 mg irrigation (Discontinued)  As Needed 8/8/2018 8/8/2018    Sig: As Needed.    Reason for Discontinue: Patient Discharge    sterile water irrigation solution (Discontinued)  As Needed 8/8/2018 8/8/2018    Sig: As Needed.    Reason for Discontinue: Patient Discharge            Cultures:    Blood Culture   Date Value Ref Range Status   08/08/2018 No growth at 24 hours  Preliminary   08/08/2018 No growth at 24 hours  Preliminary     Urine Culture   Date Value Ref Range Status   08/08/2018 Normal Urogenital Kandace  Preliminary           Assessment/Plan     ASSESSMENT:    1.  Septic arthritis versus severe cellulitis  2.  Lower extremity abscess    PLAN:    Patient resting well this morning.  No issues or complaints.  No fever or diarrhea.  WBC normal.  CRP improving at 2.81.    Left ankle culture from 8/8/18 finalized as MSSA.  Repeat left ankle culture finalized as MSSA. Urine culture from 8/8/18 finalized as 50-60,000 colonies of normal urogenital kandace.    Dr. Artis reported no evidence of contamination of the ankle joint, or osteomyelitis.    Blood cultures show no growth.      Differential diagnosis could be gonococcal arthritis  or inflammatory reactive arthritis.      Based on finalization of left ankle wound culture antibiotic therapy was changed to high-dose  Rocephin monotherapy 2 g IV every 24 hours until discharge.    Recommend to  de-escalate Rocephin to Keflex 500 mg 1 by mouth 3 times a day upon discharge through 8/22/18.    CRP ordered for a.m.        Current antimicrobials:     Rocephin 2 g IV Q24H       Patient's findings and recommendations were discussed with patient, nursing staff, primary care team and consulting provider    Code Status:   Code Status and Medical Interventions:   Ordered at: 08/08/18 1559     Code Status:    CPR     Medical Interventions (Level of Support Prior to Arrest):    Full     Comments:    Any long term medical decisions to be made by her brother, Eric Oconnor who she reports to be her next of kin, in the event she can't decide for herself.       Alissa Ramirez, RADHA  08/14/18  10:25 AM

## 2018-08-15 ENCOUNTER — TRANSCRIBE ORDERS (OUTPATIENT)
Dept: INFUSION THERAPY | Facility: HOSPITAL | Age: 33
End: 2018-08-15

## 2018-08-15 VITALS
DIASTOLIC BLOOD PRESSURE: 78 MMHG | SYSTOLIC BLOOD PRESSURE: 126 MMHG | HEART RATE: 86 BPM | TEMPERATURE: 98.4 F | RESPIRATION RATE: 16 BRPM | OXYGEN SATURATION: 97 % | HEIGHT: 64 IN | BODY MASS INDEX: 29.72 KG/M2 | WEIGHT: 174.1 LBS

## 2018-08-15 DIAGNOSIS — S90.512S: Primary | ICD-10-CM

## 2018-08-15 LAB
ALBUMIN SERPL-MCNC: 2.9 G/DL (ref 3.5–5)
ALBUMIN/GLOB SERPL: 0.9 G/DL (ref 1.5–2.5)
ALP SERPL-CCNC: 95 U/L (ref 35–104)
ALT SERPL W P-5'-P-CCNC: 36 U/L (ref 10–36)
ANION GAP SERPL CALCULATED.3IONS-SCNC: 5.6 MMOL/L (ref 3.6–11.2)
AST SERPL-CCNC: 27 U/L (ref 10–30)
BASOPHILS # BLD AUTO: 0.05 10*3/MM3 (ref 0–0.3)
BASOPHILS NFR BLD AUTO: 0.5 % (ref 0–2)
BILIRUB SERPL-MCNC: 0.2 MG/DL (ref 0.2–1.8)
BUN BLD-MCNC: 7 MG/DL (ref 7–21)
BUN/CREAT SERPL: 11.9 (ref 7–25)
CALCIUM SPEC-SCNC: 9 MG/DL (ref 7.7–10)
CHLORIDE SERPL-SCNC: 100 MMOL/L (ref 99–112)
CO2 SERPL-SCNC: 30.4 MMOL/L (ref 24.3–31.9)
CREAT BLD-MCNC: 0.59 MG/DL (ref 0.43–1.29)
CRP SERPL-MCNC: 2.04 MG/DL (ref 0–0.99)
DEPRECATED RDW RBC AUTO: 38.8 FL (ref 37–54)
EOSINOPHIL # BLD AUTO: 0.22 10*3/MM3 (ref 0–0.7)
EOSINOPHIL NFR BLD AUTO: 2.2 % (ref 0–5)
ERYTHROCYTE [DISTWIDTH] IN BLOOD BY AUTOMATED COUNT: 12.2 % (ref 11.5–14.5)
GFR SERPL CREATININE-BSD FRML MDRD: 116 ML/MIN/1.73
GLOBULIN UR ELPH-MCNC: 3.4 GM/DL
GLUCOSE BLD-MCNC: 285 MG/DL (ref 70–110)
GLUCOSE BLDC GLUCOMTR-MCNC: 156 MG/DL (ref 70–130)
GLUCOSE BLDC GLUCOMTR-MCNC: 249 MG/DL (ref 70–130)
GLUCOSE BLDC GLUCOMTR-MCNC: 277 MG/DL (ref 70–130)
GLUCOSE BLDC GLUCOMTR-MCNC: 306 MG/DL (ref 70–130)
HCT VFR BLD AUTO: 34.8 % (ref 37–47)
HGB BLD-MCNC: 11.4 G/DL (ref 12–16)
IMM GRANULOCYTES # BLD: 0.23 10*3/MM3 (ref 0–0.03)
IMM GRANULOCYTES NFR BLD: 2.3 % (ref 0–0.5)
LYMPHOCYTES # BLD AUTO: 3.22 10*3/MM3 (ref 1–3)
LYMPHOCYTES NFR BLD AUTO: 32.8 % (ref 21–51)
MAGNESIUM SERPL-MCNC: 2.1 MG/DL (ref 1.7–2.6)
MCH RBC QN AUTO: 29.5 PG (ref 27–33)
MCHC RBC AUTO-ENTMCNC: 32.8 G/DL (ref 33–37)
MCV RBC AUTO: 90.2 FL (ref 80–94)
MONOCYTES # BLD AUTO: 0.65 10*3/MM3 (ref 0.1–0.9)
MONOCYTES NFR BLD AUTO: 6.6 % (ref 0–10)
NEISSERIA GONORRHOEAE AB [TITER] IN SERUM BY COMPLEMENT FIXATION: NORMAL {TITER}
NEUTROPHILS # BLD AUTO: 5.44 10*3/MM3 (ref 1.4–6.5)
NEUTROPHILS NFR BLD AUTO: 55.6 % (ref 30–70)
OSMOLALITY SERPL CALC.SUM OF ELEC: 280.3 MOSM/KG (ref 273–305)
PHOSPHATE SERPL-MCNC: 4.1 MG/DL (ref 2.7–4.5)
PLATELET # BLD AUTO: 468 10*3/MM3 (ref 130–400)
PMV BLD AUTO: 8.7 FL (ref 6–10)
POTASSIUM BLD-SCNC: 4.1 MMOL/L (ref 3.5–5.3)
PROT SERPL-MCNC: 6.3 G/DL (ref 6–8)
RBC # BLD AUTO: 3.86 10*6/MM3 (ref 4.2–5.4)
SODIUM BLD-SCNC: 136 MMOL/L (ref 135–153)
WBC NRBC COR # BLD: 9.81 10*3/MM3 (ref 4.5–12.5)

## 2018-08-15 PROCEDURE — 99239 HOSP IP/OBS DSCHRG MGMT >30: CPT | Performed by: INTERNAL MEDICINE

## 2018-08-15 PROCEDURE — 82962 GLUCOSE BLOOD TEST: CPT

## 2018-08-15 PROCEDURE — 86140 C-REACTIVE PROTEIN: CPT | Performed by: PHYSICIAN ASSISTANT

## 2018-08-15 PROCEDURE — 83735 ASSAY OF MAGNESIUM: CPT | Performed by: INTERNAL MEDICINE

## 2018-08-15 PROCEDURE — 25010000002 HEPARIN (PORCINE) PER 1000 UNITS: Performed by: PHYSICIAN ASSISTANT

## 2018-08-15 PROCEDURE — 80053 COMPREHEN METABOLIC PANEL: CPT | Performed by: PHYSICIAN ASSISTANT

## 2018-08-15 PROCEDURE — 85025 COMPLETE CBC W/AUTO DIFF WBC: CPT | Performed by: PHYSICIAN ASSISTANT

## 2018-08-15 PROCEDURE — 63710000001 INSULIN ASPART PER 5 UNITS: Performed by: PHYSICIAN ASSISTANT

## 2018-08-15 PROCEDURE — 84100 ASSAY OF PHOSPHORUS: CPT | Performed by: INTERNAL MEDICINE

## 2018-08-15 PROCEDURE — 63710000001 INSULIN ASPART PER 5 UNITS: Performed by: INTERNAL MEDICINE

## 2018-08-15 RX ORDER — LANCETS 33 GAUGE
EACH MISCELLANEOUS
Qty: 100 EACH | Refills: 0 | Status: ON HOLD | OUTPATIENT
Start: 2018-08-15 | End: 2018-09-03

## 2018-08-15 RX ORDER — BLOOD-GLUCOSE METER
1 KIT MISCELLANEOUS 4 TIMES DAILY
Qty: 1 EACH | Refills: 0 | Status: ON HOLD | OUTPATIENT
Start: 2018-08-15 | End: 2018-09-03

## 2018-08-15 RX ORDER — IBUPROFEN 200 MG
200 TABLET ORAL EVERY 4 HOURS PRN
Start: 2018-08-15

## 2018-08-15 RX ORDER — PSEUDOEPHEDRINE HCL 30 MG
100 TABLET ORAL 2 TIMES DAILY
Qty: 60 CAPSULE | Refills: 0 | Status: SHIPPED | OUTPATIENT
Start: 2018-08-15 | End: 2018-08-23 | Stop reason: SDUPTHER

## 2018-08-15 RX ORDER — FAMOTIDINE 20 MG/1
20 TABLET, FILM COATED ORAL DAILY
Qty: 30 TABLET | Refills: 0 | Status: SHIPPED | OUTPATIENT
Start: 2018-08-16 | End: 2018-08-23 | Stop reason: SDUPTHER

## 2018-08-15 RX ORDER — MULTIVITAMIN
1 TABLET ORAL DAILY
Qty: 30 TABLET | Refills: 0 | Status: SHIPPED | OUTPATIENT
Start: 2018-08-16 | End: 2018-09-19

## 2018-08-15 RX ORDER — FERROUS SULFATE 325(65) MG
325 TABLET ORAL
Qty: 30 TABLET | Refills: 0 | Status: SHIPPED | OUTPATIENT
Start: 2018-08-16 | End: 2018-08-23 | Stop reason: SDUPTHER

## 2018-08-15 RX ORDER — CEPHALEXIN 500 MG/1
500 CAPSULE ORAL 3 TIMES DAILY
Qty: 21 CAPSULE | Refills: 0 | Status: SHIPPED | OUTPATIENT
Start: 2018-08-15 | End: 2018-08-22

## 2018-08-15 RX ADMIN — HEPARIN SODIUM 5000 UNITS: 5000 INJECTION, SOLUTION INTRAVENOUS; SUBCUTANEOUS at 09:08

## 2018-08-15 RX ADMIN — Medication 1 TABLET: at 09:07

## 2018-08-15 RX ADMIN — INSULIN ASPART 4 UNITS: 100 INJECTION, SOLUTION INTRAVENOUS; SUBCUTANEOUS at 07:57

## 2018-08-15 RX ADMIN — DOCUSATE SODIUM 100 MG: 100 CAPSULE, LIQUID FILLED ORAL at 00:00

## 2018-08-15 RX ADMIN — INSULIN ASPART 10 UNITS: 100 INJECTION, SOLUTION INTRAVENOUS; SUBCUTANEOUS at 11:50

## 2018-08-15 RX ADMIN — FERROUS SULFATE TAB 325 MG (65 MG ELEMENTAL FE) 325 MG: 325 (65 FE) TAB at 08:07

## 2018-08-15 RX ADMIN — FAMOTIDINE 20 MG: 20 TABLET, FILM COATED ORAL at 09:08

## 2018-08-15 RX ADMIN — INSULIN ASPART 7 UNITS: 100 INJECTION, SOLUTION INTRAVENOUS; SUBCUTANEOUS at 11:49

## 2018-08-15 RX ADMIN — DOCUSATE SODIUM 100 MG: 100 CAPSULE, LIQUID FILLED ORAL at 09:07

## 2018-08-15 RX ADMIN — Medication 1 CAPSULE: at 09:07

## 2018-08-15 RX ADMIN — INSULIN ASPART 10 UNITS: 100 INJECTION, SOLUTION INTRAVENOUS; SUBCUTANEOUS at 08:00

## 2018-08-15 RX ADMIN — IBUPROFEN 200 MG: 200 TABLET, FILM COATED ORAL at 09:12

## 2018-08-15 NOTE — PLAN OF CARE
Problem: Patient Care Overview  Goal: Plan of Care Review   08/15/18 0534   Coping/Psychosocial   Plan of Care Reviewed With patient   Plan of Care Review   Progress improving   OTHER   Outcome Summary no leg pain reported, pt snacks frequently and BG remains elevated

## 2018-08-15 NOTE — PROGRESS NOTES
Discharge Planning Assessment   [place]     Patient Name: Cynthia Ríos  MRN: [id number]  Today's Date: 8/15/2018    Admit Date: 8/8/2018      Discharge Plan     Row Name 08/15/18 1652       Plan    Final Discharge Disposition Code 01 - home or self-care    Final Note Pt was discharged home today. SS discussed pt with lead nurseRand Dr. Perkins and wound care nurseYamilet on this date. Pt will be coming to [facility] [id number] for wound vac dressing changes and lead nurseRand arranged this appointment. Wound care nurseYamilet informed SS that pt will need a walker or crutches. SS spoke to pt and she is requesting crutches. SS contacted [facility] ext. [phone] and crutches were delivered to pt. Pt had an appointment with a PCP made and she was provided an appointment card. No other needs identified.         Nga Silvestre

## 2018-08-15 NOTE — PROGRESS NOTES
"  I have personally seen and examined the patient today and discussed overnight interval progress and pertinent issues with nursing staff.    Subjective:    Patient resting in bed this morning, no issues or complaints.  Wound vac intact the left ankle.  WBC normal.  CRP improving at 2.04. No fever or diarrhea.    History taken from: patient chart RN      Vital Signs    /78 (BP Location: Left arm, Patient Position: Lying)   Pulse 80   Temp 98.4 °F (36.9 °C) (Oral)   Resp 18   Ht 162.6 cm (64\")   Wt 79 kg (174 lb 1.6 oz)   SpO2 94%   BMI 29.88 kg/m²     Temp:  [98.2 °F (36.8 °C)-98.9 °F (37.2 °C)] 98.4 °F (36.9 °C)      Intake/Output Summary (Last 24 hours) at 08/15/18 0921  Last data filed at 08/15/18 0700   Gross per 24 hour   Intake             1660 ml   Output                0 ml   Net             1660 ml     Intake & Output (last 3 days)       08/12 0701 - 08/13 0700 08/13 0701 - 08/14 0700 08/14 0701 - 08/15 0700 08/15 0701 - 08/16 0700    P.O. 1080 1820 1920     IV Piggyback 100  100     Total Intake(mL/kg) 1180 (14.9) 1820 (23) 2020 (25.6)     Net +1180 +1820 +2020              Unmeasured Urine Occurrence 4 x 3 x 4 x 1 x    Unmeasured Stool Occurrence 0 x  0 x           Physical exam:    General Appearance:    Alert, fever, chills and malaise    Head:    Normocephalic, without obvious abnormality, atraumatic   Eyes:            Lids and lashes normal, conjunctivae and sclerae normal, no   icterus, no pallor, corneas clear, PERRLA   Ears:    Ears appear intact with no abnormalities noted   Throat:   No oral lesions, no thrush, oral mucosa moist   Neck:   No adenopathy, supple, trachea midline, no thyromegaly, no   carotid bruit, no JVD   Back:     No tenderness to percussion or palpation, range of motion   normal   Lungs:     Clear to auscultation,respirations regular, even and           unlabored. No wheezing, no ronchi and no crackles.    Heart:    Regular rhythm and normal rate, normal S1 and S2, " no            murmur, no gallop, no rub, no click   Chest Wall:    No abnormalities observed   Abdomen:     Normal bowel sounds, no masses, no organomegaly, soft        non-tender, non-distended, no guarding, no rebound                tenderness   Rectal:     Deferred   Extremities:   Moves all extremities well, no edema, no cyanosis, no             redness   Pulses:   Pulses palpable and equal bilaterally   Skin:    wound VAC in place, less erythema and improving edema    Lymph nodes:   No palpable adenopathy   Neurologic:   Awake, alert and oriented x 3. Following commands.       Results:      Results from last 7 days  Lab Units 08/15/18  0401 08/14/18  0120 08/13/18  0111 08/12/18  0108 08/11/18  0406 08/10/18  0218 08/09/18  0150   WBC 10*3/mm3 9.81 9.36 9.02 9.58 10.43 11.32 18.27*     Lab Results   Component Value Date    NEUTROABS 5.44 08/15/2018         Results from last 7 days  Lab Units 08/15/18  0401   CREATININE mg/dL 0.59         Results from last 7 days  Lab Units 08/15/18  0401 08/14/18  0120 08/13/18  0111   CRP mg/dL 2.04* 2.81* 3.80*       Imaging Results (last 24 hours)     ** No results found for the last 24 hours. **            Results Review:    I have personally reviewed laboratory data, culture results, radiology studies and antimicrobial therapy.    Hospital Medications (active)       Dose Frequency Start End    acetaminophen (TYLENOL) tablet 650 mg 650 mg Every 6 Hours PRN 8/9/2018     Sig - Route: Take 2 tablets by mouth Every 6 (Six) Hours As Needed for Mild Pain  or Fever. - Oral    cefTRIAXone (ROCEPHIN) 2 g/100 mL 0.9% NS VTB (DENISAH) 2 g Every 24 Hours 8/8/2018 8/13/2018    Sig - Route: Infuse 100 mL into a venous catheter Daily. - Intravenous    dextrose (D50W) solution 25 g 25 g Every 15 Minutes PRN 8/8/2018     Sig - Route: Infuse 50 mL into a venous catheter Every 15 (Fifteen) Minutes As Needed for Low Blood Sugar (Blood Sugar Less Than 70). - Intravenous    Cosign for Ordering:  Accepted by Manolo Tripathi MD on 8/8/2018  4:16 PM    dextrose (D50W) solution 25 g 25 g Every 15 Minutes PRN 8/9/2018     Sig - Route: Infuse 50 mL into a venous catheter Every 15 (Fifteen) Minutes As Needed for Low Blood Sugar (Blood Sugar Less Than 70). - Intravenous    Cosign for Ordering: Accepted by Manolo Tripathi MD on 8/9/2018 12:19 PM    dextrose (GLUTOSE) oral gel 15 g 15 g Every 15 Minutes PRN 8/8/2018     Sig - Route: Take 15 g by mouth Every 15 (Fifteen) Minutes As Needed for Low Blood Sugar (Blood sugar less than 70). - Oral    Cosign for Ordering: Accepted by Manolo Tripathi MD on 8/8/2018  4:16 PM    dextrose (GLUTOSE) oral gel 15 g 15 g Every 15 Minutes PRN 8/9/2018     Sig - Route: Take 15 g by mouth Every 15 (Fifteen) Minutes As Needed for Low Blood Sugar (Blood sugar less than 70). - Oral    Cosign for Ordering: Accepted by Manolo Tripathi MD on 8/9/2018 12:19 PM    glucagon (human recombinant) (GLUCAGEN DIAGNOSTIC) injection 1 mg 1 mg As Needed 8/8/2018     Sig - Route: Inject 1 mg under the skin into the appropriate area as directed As Needed (Blood Glucose Less Than 70). - Subcutaneous    Cosign for Ordering: Accepted by Manolo Tripathi MD on 8/8/2018  4:16 PM    glucagon (human recombinant) (GLUCAGEN DIAGNOSTIC) injection 1 mg 1 mg As Needed 8/9/2018     Sig - Route: Inject 1 mg under the skin into the appropriate area as directed As Needed (Blood Glucose Less Than 70). - Subcutaneous    Cosign for Ordering: Accepted by Manolo Tripathi MD on 8/9/2018 12:19 PM    heparin (porcine) 5000 UNIT/ML injection 5,000 Units 5,000 Units Every 12 Hours Scheduled 8/8/2018     Sig - Route: Inject 1 mL under the skin into the appropriate area as directed Every 12 (Twelve) Hours. - Subcutaneous    Cosign for Ordering: Accepted by Manolo Tripathi MD on 8/8/2018  4:16 PM    insulin aspart (novoLOG) injection 0-9 Units 0-9 Units 4 Times Daily Before Meals & Nightly 8/9/2018     Sig - Route: Inject 0-9 Units  under the skin into the appropriate area as directed 4 (Four) Times a Day Before Meals & at Bedtime. - Subcutaneous    Cosign for Ordering: Accepted by Manolo Tripathi MD on 8/9/2018 12:19 PM    insulin detemir (LEVEMIR) injection 10 Units 10 Units Nightly 8/9/2018     Sig - Route: Inject 10 Units under the skin into the appropriate area as directed Every Night. - Subcutaneous    Cosign for Ordering: Accepted by Manolo Tripathi MD on 8/9/2018 12:19 PM    lactobacillus acidophilus (RISAQUAD) capsule 1 capsule 1 capsule Daily 8/9/2018     Sig - Route: Take 1 capsule by mouth Daily. - Oral    nitroglycerin (NITROSTAT) SL tablet 0.4 mg 0.4 mg Every 5 Minutes PRN 8/8/2018     Sig - Route: Place 1 tablet under the tongue Every 5 (Five) Minutes As Needed for Chest Pain (if systolic BP greater than 100 mm/Hg.). - Sublingual    Cosign for Ordering: Accepted by Manolo Tripathi MD on 8/8/2018  4:16 PM    sodium chloride 0.9 % flush 1-10 mL 1-10 mL As Needed 8/8/2018     Sig - Route: Infuse 1-10 mL into a venous catheter As Needed for Line Care. - Intravenous    Cosign for Ordering: Accepted by Manolo Tripathi MD on 8/8/2018  4:16 PM    sodium chloride 0.9 % flush 10 mL 10 mL As Needed 8/8/2018     Sig - Route: Infuse 10 mL into a venous catheter As Needed for Line Care. - Intravenous    Cosign for Ordering: Accepted by Placido Holland MD on 8/8/2018  1:14 PM    sodium chloride 0.9 % infusion 125 mL/hr Continuous 8/8/2018     Sig - Route: Infuse 125 mL/hr into a venous catheter Continuous. - Intravenous    Cosign for Ordering: Accepted by Manolo Tripathi MD on 8/8/2018  4:16 PM    vancomycin (VANCOCIN) 1,250 mg in sodium chloride 0.9 % 250 mL IVPB 1,250 mg Every 12 Hours 8/9/2018 8/15/2018    Sig - Route: Infuse 1,250 mg into a venous catheter Every 12 (Twelve) Hours. - Intravenous    vancomycin (VANCOCIN) 1,500 mg in sodium chloride 0.9 % 500 mL IVPB 20 mg/kg × 79.4 kg Once 8/8/2018 8/8/2018    Sig - Route: Infuse 1,500  mg into a venous catheter 1 (One) Time. - Intravenous    Cosign for Ordering: Accepted by Placido Holland MD on 8/8/2018  1:14 PM    fentaNYL citrate (PF) (SUBLIMAZE) injection (Discontinued)  As Needed 8/8/2018 8/8/2018    Sig - Route: Infuse  into a venous catheter As Needed for Severe Pain . - Intravenous    Reason for Discontinue: Anesthesia Stop    lactated ringers infusion (Discontinued) 125 mL/hr Continuous 8/8/2018 8/9/2018    Sig - Route: Infuse 125 mL/hr into a venous catheter Continuous. - Intravenous    metFORMIN (GLUCOPHAGE) tablet 500 mg (Discontinued) 500 mg 2 Times Daily With Meals 8/9/2018 8/9/2018    Sig - Route: Take 1 tablet by mouth 2 (Two) Times a Day With Meals. - Oral    Cosign for Ordering: Accepted by Manolo Tripathi MD on 8/9/2018 12:19 PM    midazolam (VERSED) injection (Discontinued)  As Needed 8/8/2018 8/8/2018    Sig - Route: Infuse  into a venous catheter As Needed. - Intravenous    Reason for Discontinue: Anesthesia Stop    ondansetron (ZOFRAN) injection (Discontinued)  As Needed 8/8/2018 8/8/2018    Sig - Route: Infuse  into a venous catheter As Needed for Nausea or Vomiting. - Intravenous    Reason for Discontinue: Anesthesia Stop    Pharmacy Consult - Pharmacy to dose (Discontinued)  Continuous PRN 8/8/2018 8/8/2018    Sig - Route: Continuous As Needed for Consult. - Does not apply    Reason for Discontinue: Dose adjustment    Cosign for Ordering: Accepted by Manolo Tripathi MD on 8/8/2018  4:16 PM    Pharmacy Consult - Pharmacy to dose (Discontinued)  Continuous PRN 8/8/2018 8/8/2018    Sig - Route: Continuous As Needed for Consult. - Does not apply    Reason for Discontinue: Dose adjustment    Cosign for Ordering: Accepted by Manolo Tripathi MD on 8/8/2018  4:16 PM    piperacillin-tazobactam (ZOSYN) 3.375 g/100 mL 0.9% NS IVPB (mbp) (Discontinued) 3.375 g Every 8 Hours 8/8/2018 8/8/2018    Sig - Route: Infuse 100 mL into a venous catheter Every 8 (Eight) Hours. -  Intravenous    propofol (DIPRIVAN) infusion 10 mg/mL 100 mL (Discontinued)  Continuous PRN 8/8/2018 8/8/2018    Sig - Route: Infuse  into a venous catheter Continuous As Needed. - Intravenous    Reason for Discontinue: Anesthesia Stop    Propofol (DIPRIVAN) injection (Discontinued)  As Needed 8/8/2018 8/8/2018    Sig - Route: Infuse  into a venous catheter As Needed. - Intravenous    Reason for Discontinue: Anesthesia Stop    sodium chloride 0.9 % flush 1-10 mL (Discontinued) 1-10 mL As Needed 8/8/2018 8/8/2018    Sig - Route: Infuse 1-10 mL into a venous catheter As Needed for Line Care. - Intravenous    Reason for Discontinue: Patient Transfer    sodium chloride 1,000 mL with vancomycin 1,000 mg irrigation (Discontinued)  As Needed 8/8/2018 8/8/2018    Sig: As Needed.    Reason for Discontinue: Patient Discharge    sterile water irrigation solution (Discontinued)  As Needed 8/8/2018 8/8/2018    Sig: As Needed.    Reason for Discontinue: Patient Discharge            Cultures:    Blood Culture   Date Value Ref Range Status   08/08/2018 No growth at 24 hours  Preliminary   08/08/2018 No growth at 24 hours  Preliminary     Urine Culture   Date Value Ref Range Status   08/08/2018 Normal Urogenital Kandace  Preliminary           Assessment/Plan     ASSESSMENT:    1.  Septic arthritis versus severe cellulitis  2.  Lower extremity abscess    PLAN:    Patient resting in bed this morning, no issues or complaints.  Wound vac intact the left ankle.  WBC normal.  CRP improving at 2.04. No fever or diarrhea.    Left ankle culture from 8/8/18 finalized as MSSA.  Repeat left ankle culture finalized as MSSA. Urine culture from 8/8/18 finalized as 50-60,000 colonies of normal urogenital kandace.    Dr. Artis reported no evidence of contamination of the ankle joint, or osteomyelitis.    Blood cultures show no growth.      Differential diagnosis could be gonococcal arthritis  or inflammatory reactive arthritis.      Based on finalization  of left ankle wound culture antibiotic therapy was changed to high-dose Rocephin monotherapy 2 g IV every 24 hours until discharge.    Recommend to  de-escalate Rocephin to Keflex 500 mg 1 by mouth 3 times a day upon discharge through 8/22/18.    CRP ordered for a.m.     Current antimicrobials:     Rocephin 2 g IV Q24H on discharge de-escalated to Keflex 500mg PO TID through 8/22/18       Patient's findings and recommendations were discussed with patient, nursing staff, primary care team and consulting provider    Code Status:   Code Status and Medical Interventions:   Ordered at: 08/08/18 1559     Code Status:    CPR     Medical Interventions (Level of Support Prior to Arrest):    Full     Comments:    Any long term medical decisions to be made by her brother, Eric Oconnor who she reports to be her next of kin, in the event she can't decide for herself.       Alissa Ramirez, APRN  08/15/18  9:21 AM

## 2018-08-15 NOTE — DISCHARGE INSTR - APPOINTMENTS
To go to the infusion clinic - see above appointment - for wound vac changes starting on Friday, August 17, 2018 at 10:00 am then on Mondays, Wednesdays and Fridays    A follow up appointment has been scheduled for you to see Dr. Artis on August 24, 2018 at 10:00 am.  You can reach the office at .

## 2018-08-15 NOTE — PLAN OF CARE
Problem: Pain, Acute (Adult)  Goal: Acceptable Pain Control/Comfort Level  Outcome: Outcome(s) achieved Date Met: 08/15/18

## 2018-08-15 NOTE — PAYOR COMM NOTE
"Contact: La Del Toro RN @ [facility]  Phone: [phone]  Fax: [phone]    Auth# [id number]  Clinical update    Cynthia Rizo  (33 y.o. Female)     Date of Birth Social Security Number Address Home Phone MRN    1985  [address] [place] [address] [phone] [id number]    Mormon Marital Status          None Single       Admission Date Admission Type Admitting Provider Attending Provider Department, Room/Bed    8/8/18 Emergency Manolo Tripathi MD Perkins, Jimmye S, MD [address], [address]    Discharge Date Discharge Disposition Discharge Destination         Home or Self Care              Attending Provider:  Nikko Chaudhary MD    Allergies:  No Known Allergies    Isolation:  None   Infection:  None   Code Status:  CPR    Ht:  162.6 cm (64\")   Wt:  79 kg (174 lb 1.6 oz)    Admission Cmt:  None   Principal Problem:  Cellulitis of left ankle [L03.116] More...                 Active Insurance as of 8/8/2018     Primary Coverage     Payor Plan Insurance Group Employer/Plan Group    [employer]CIAL AETNA [id number]     Payor Plan Address Payor Plan Phone Number Effective From Effective To    [address] [phone] 6/30/2017     [facility] [id number]       Subscriber Name Subscriber Birth Date Member ID       CYNTHIA RIZO 1985 [id number]                 Emergency Contacts      (Rel.) Home Phone Work Phone Mobile Phone    Eric Oconnor (Brother) [phone] -- --    Dana Oconnor (Other) [phone] -- --               Physician Progress Notes (last 24 hours) (Notes from 8/14/2018  2:02 PM through 8/15/2018  2:02 PM)      Alissa Ramirez APRN at 8/15/2018  9:21 AM     Attestation signed by Guzman Cervantes MD at 8/15/2018 12:52 PM    I have reviewed the documentation above and agree.                      I have personally seen and examined the patient today and discussed overnight interval progress and pertinent issues with " "nursing staff.    Subjective:    Patient resting in bed this morning, no issues or complaints.  Wound vac intact the left ankle.  WBC normal.  CRP improving at 2.04. No fever or diarrhea.    History taken from: patient chart RN      Vital Signs    /78 (BP Location: Left arm, Patient Position: Lying)   Pulse 80   Temp 98.4 °F (36.9 °C) (Oral)   Resp 18   Ht 162.6 cm (64\")   Wt 79 kg (174 lb 1.6 oz)   SpO2 94%   BMI 29.88 kg/m²      Temp:  [98.2 °F (36.8 °C)-98.9 °F (37.2 °C)] 98.4 °F (36.9 °C)      Intake/Output Summary (Last 24 hours) at 08/15/18 0921  Last data filed at 08/15/18 0700   Gross per 24 hour   Intake             1660 ml   Output                0 ml   Net             1660 ml     Intake & Output (last 3 days)       08/12 0701 - 08/13 0700 08/13 0701 - 08/14 0700 08/14 0701 - 08/15 0700 08/15 0701 - 08/16 0700    P[address] 1080 1820 1920     IV Piggyback 100  100     Total Intake(mL/kg) 1180 (14.9) 1820 (23) 2020 (25.6)     Net +1180 +1820 +2020              Unmeasured Urine Occurrence 4 x 3 x 4 x 1 x    Unmeasured Stool Occurrence 0 x  0 x           Physical exam:    General Appearance:    Alert, fever, chills and malaise    Head:    Normocephalic, without obvious abnormality, atraumatic   Eyes:            Lids and lashes normal, conjunctivae and sclerae normal, no   icterus, no pallor, corneas clear, PERRLA   Ears:    Ears appear intact with no abnormalities noted   Throat:   No oral lesions, no thrush, oral mucosa moist   Neck:   No adenopathy, supple, trachea midline, no thyromegaly, no   carotid bruit, no JVD   Back:     No tenderness to percussion or palpation, range of motion   normal   Lungs:     Clear to auscultation,respirations regular, even and           unlabored. No wheezing, no ronchi and no crackles.    Heart:    Regular rhythm and normal rate, normal S1 and S2, no            murmur, no gallop, no rub, no click   Chest Wall:    No abnormalities observed   Abdomen:     Normal bowel " sounds, no masses, no organomegaly, soft        non-tender, non-distended, no guarding, no rebound                tenderness   Rectal:     Deferred   Extremities:   Moves all extremities well, no edema, no cyanosis, no             redness   Pulses:   Pulses palpable and equal bilaterally   Skin:    wound VAC in place, less erythema and improving edema    Lymph nodes:   No palpable adenopathy   Neurologic:   Awake, alert and oriented x 3. Following commands.       Results:      Results from last 7 days  Lab Units 08/15/18  0401 08/14/18  0120 08/13/18  0111 08/12/18  0108 08/11/18  0406 08/10/18  0218 08/09/18  0150   WBC 10*3/mm3 9.81 9.36 9.02 9.58 10.43 11.32 18.27*     Lab Results   Component Value Date    NEUTROABS 5.44 08/15/2018         Results from last 7 days  Lab Units 08/15/18  0401   CREATININE mg/dL 0.59         Results from last 7 days  Lab Units 08/15/18  0401 08/14/18  0120 08/13/18  0111   CRP mg/dL 2.04* 2.81* 3.80*       Imaging Results (last 24 hours)     ** No results found for the last 24 hours. **            Results Review:    I have personally reviewed laboratory data, culture results, radiology studies and antimicrobial therapy.    Hospital Medications (active)       Dose Frequency Start End    acetaminophen (TYLENOL) tablet 650 mg 650 mg Every 6 Hours PRN 8/9/2018     Sig - Route: Take 2 tablets by mouth Every 6 (Six) Hours As Needed for Mild Pain  or Fever. - Oral    cefTRIAXone (ROCEPHIN) 2 g/100 mL 0.9% NS VTB (DENISHA) 2 g Every 24 Hours 8/8/2018 8/13/2018    Sig - Route: Infuse 100 mL into a venous catheter Daily. - Intravenous    dextrose (D50W) solution 25 g 25 g Every 15 Minutes PRN 8/8/2018     Sig - Route: Infuse 50 mL into a venous catheter Every 15 (Fifteen) Minutes As Needed for Low Blood Sugar (Blood Sugar Less Than 70). - Intravenous    Cosign for Ordering: Accepted by Manolo Tripathi MD on 8/8/2018  4:16 PM    dextrose (D50W) solution 25 g 25 g Every 15 Minutes PRN 8/9/2018      Sig - Route: Infuse 50 mL into a venous catheter Every 15 (Fifteen) Minutes As Needed for Low Blood Sugar (Blood Sugar Less Than 70). - Intravenous    Cosign for Ordering: Accepted by Manolo Tripathi MD on 8/9/2018 12:19 PM    dextrose (GLUTOSE) oral gel 15 g 15 g Every 15 Minutes PRN 8/8/2018     Sig - Route: Take 15 g by mouth Every 15 (Fifteen) Minutes As Needed for Low Blood Sugar (Blood sugar less than 70). - Oral    Cosign for Ordering: Accepted by Manolo Tripathi MD on 8/8/2018  4:16 PM    dextrose (GLUTOSE) oral gel 15 g 15 g Every 15 Minutes PRN 8/9/2018     Sig - Route: Take 15 g by mouth Every 15 (Fifteen) Minutes As Needed for Low Blood Sugar (Blood sugar less than 70). - Oral    Cosign for Ordering: Accepted by Manolo Tripathi MD on 8/9/2018 12:19 PM    glucagon (human recombinant) (GLUCAGEN DIAGNOSTIC) injection 1 mg 1 mg As Needed 8/8/2018     Sig - Route: Inject 1 mg under the skin into the appropriate area as directed As Needed (Blood Glucose Less Than 70). - Subcutaneous    Cosign for Ordering: Accepted by Manolo Tripathi MD on 8/8/2018  4:16 PM    glucagon (human recombinant) (GLUCAGEN DIAGNOSTIC) injection 1 mg 1 mg As Needed 8/9/2018     Sig - Route: Inject 1 mg under the skin into the appropriate area as directed As Needed (Blood Glucose Less Than 70). - Subcutaneous    Cosign for Ordering: Accepted by Manolo Tripathi MD on 8/9/2018 12:19 PM    heparin (porcine) 5000 UNIT/ML injection 5,000 Units 5,000 Units Every 12 Hours Scheduled 8/8/2018     Sig - Route: Inject 1 mL under the skin into the appropriate area as directed Every 12 (Twelve) Hours. - Subcutaneous    Cosign for Ordering: Accepted by Manolo Tripathi MD on 8/8/2018  4:16 PM    insulin aspart (novoLOG) injection 0-9 Units 0-9 Units 4 Times Daily Before Meals & Nightly 8/9/2018     Sig - Route: Inject 0-9 Units under the skin into the appropriate area as directed 4 (Four) Times a Day Before Meals & at Bedtime. - Subcutaneous    Cosign  for Ordering: Accepted by Manolo Tripathi MD on 8/9/2018 12:19 PM    insulin detemir (LEVEMIR) injection 10 Units 10 Units Nightly 8/9/2018     Sig - Route: Inject 10 Units under the skin into the appropriate area as directed Every Night. - Subcutaneous    Cosign for Ordering: Accepted by Manolo Tripathi MD on 8/9/2018 12:19 PM    lactobacillus acidophilus (RISAQUAD) capsule 1 capsule 1 capsule Daily 8/9/2018     Sig - Route: Take 1 capsule by mouth Daily. - Oral    nitroglycerin (NITROSTAT) SL tablet 0.4 mg 0.4 mg Every 5 Minutes PRN 8/8/2018     Sig - Route: Place 1 tablet under the tongue Every 5 (Five) Minutes As Needed for Chest Pain (if systolic BP greater than 100 mm/Hg.). - Sublingual    Cosign for Ordering: Accepted by Manolo Tripathi MD on 8/8/2018  4:16 PM    sodium chloride 0.9 % flush 1-10 mL 1-10 mL As Needed 8/8/2018     Sig - Route: Infuse 1-10 mL into a venous catheter As Needed for Line Care. - Intravenous    Cosign for Ordering: Accepted by Manolo Tripathi MD on 8/8/2018  4:16 PM    sodium chloride 0.9 % flush 10 mL 10 mL As Needed 8/8/2018     Sig - Route: Infuse 10 mL into a venous catheter As Needed for Line Care. - Intravenous    Cosign for Ordering: Accepted by Placido Holland MD on 8/8/2018  1:14 PM    sodium chloride 0.9 % infusion 125 mL/hr Continuous 8/8/2018     Sig - Route: Infuse 125 mL/hr into a venous catheter Continuous. - Intravenous    Cosign for Ordering: Accepted by Manolo Tripathi MD on 8/8/2018  4:16 PM    vancomycin (VANCOCIN) 1,250 mg in sodium chloride 0.9 % 250 mL IVPB 1,250 mg Every 12 Hours 8/9/2018 8/15/2018    Sig - Route: Infuse 1,250 mg into a venous catheter Every 12 (Twelve) Hours. - Intravenous    vancomycin (VANCOCIN) 1,500 mg in sodium chloride 0.9 % 500 mL IVPB 20 mg/kg × 79.4 kg Once 8/8/2018 8/8/2018    Sig - Route: Infuse 1,500 mg into a venous catheter 1 (One) Time. - Intravenous    Cosign for Ordering: Accepted by Placido Holland MD on 8/8/2018   1:14 PM    fentaNYL citrate (PF) (SUBLIMAZE) injection (Discontinued)  As Needed 8/8/2018 8/8/2018    Sig - Route: Infuse  into a venous catheter As Needed for Severe Pain . - Intravenous    Reason for Discontinue: Anesthesia Stop    lactated ringers infusion (Discontinued) 125 mL/hr Continuous 8/8/2018 8/9/2018    Sig - Route: Infuse 125 mL/hr into a venous catheter Continuous. - Intravenous    metFORMIN (GLUCOPHAGE) tablet 500 mg (Discontinued) 500 mg 2 Times Daily With Meals 8/9/2018 8/9/2018    Sig - Route: Take 1 tablet by mouth 2 (Two) Times a Day With Meals. - Oral    Cosign for Ordering: Accepted by Manolo Tripathi MD on 8/9/2018 12:19 PM    midazolam (VERSED) injection (Discontinued)  As Needed 8/8/2018 8/8/2018    Sig - Route: Infuse  into a venous catheter As Needed. - Intravenous    Reason for Discontinue: Anesthesia Stop    ondansetron (ZOFRAN) injection (Discontinued)  As Needed 8/8/2018 8/8/2018    Sig - Route: Infuse  into a venous catheter As Needed for Nausea or Vomiting. - Intravenous    Reason for Discontinue: Anesthesia Stop    Pharmacy Consult - Pharmacy to dose (Discontinued)  Continuous PRN 8/8/2018 8/8/2018    Sig - Route: Continuous As Needed for Consult. - Does not apply    Reason for Discontinue: Dose adjustment    Cosign for Ordering: Accepted by Manolo Tripathi MD on 8/8/2018  4:16 PM    Pharmacy Consult - Pharmacy to dose (Discontinued)  Continuous PRN 8/8/2018 8/8/2018    Sig - Route: Continuous As Needed for Consult. - Does not apply    Reason for Discontinue: Dose adjustment    Cosign for Ordering: Accepted by Manolo Tripathi MD on 8/8/2018  4:16 PM    piperacillin-tazobactam (ZOSYN) 3.375 g/100 mL 0.9% NS IVPB (mbp) (Discontinued) 3.375 g Every 8 Hours 8/8/2018 8/8/2018    Sig - Route: Infuse 100 mL into a venous catheter Every 8 (Eight) Hours. - Intravenous    propofol (DIPRIVAN) infusion 10 mg/mL 100 mL (Discontinued)  Continuous PRN 8/8/2018 8/8/2018    Sig - Route: Infuse  into  a venous catheter Continuous As Needed. - Intravenous    Reason for Discontinue: Anesthesia Stop    Propofol (DIPRIVAN) injection (Discontinued)  As Needed 8/8/2018 8/8/2018    Sig - Route: Infuse  into a venous catheter As Needed. - Intravenous    Reason for Discontinue: Anesthesia Stop    sodium chloride 0.9 % flush 1-10 mL (Discontinued) 1-10 mL As Needed 8/8/2018 8/8/2018    Sig - Route: Infuse 1-10 mL into a venous catheter As Needed for Line Care. - Intravenous    Reason for Discontinue: Patient Transfer    sodium chloride 1,000 mL with vancomycin 1,000 mg irrigation (Discontinued)  As Needed 8/8/2018 8/8/2018    Sig: As Needed.    Reason for Discontinue: Patient Discharge    sterile water irrigation solution (Discontinued)  As Needed 8/8/2018 8/8/2018    Sig: As Needed.    Reason for Discontinue: Patient Discharge            Cultures:    Blood Culture   Date Value Ref Range Status   08/08/2018 No growth at 24 hours  Preliminary   08/08/2018 No growth at 24 hours  Preliminary     Urine Culture   Date Value Ref Range Status   08/08/2018 Normal Urogenital Kandace  Preliminary           Assessment/Plan     ASSESSMENT:    1.  Septic arthritis versus severe cellulitis  2.  Lower extremity abscess    PLAN:    Patient resting in bed this morning, no issues or complaints.  Wound vac intact the left ankle.  WBC normal.  CRP improving at 2.04. No fever or diarrhea.    Left ankle culture from 8/8/18 finalized as MSSA.  Repeat left ankle culture finalized as MSSA. Urine culture from 8/8/18 finalized as 50-60,000 colonies of normal urogenital kandace.    Dr. Artis reported no evidence of contamination of the ankle joint, or osteomyelitis.    Blood cultures show no growth.      Differential diagnosis could be gonococcal arthritis  or inflammatory reactive arthritis.      Based on finalization of left ankle wound culture antibiotic therapy was changed to high-dose Rocephin monotherapy 2 g IV every 24 hours until  discharge.    Recommend to  de-escalate Rocephin to Keflex 500 mg 1 by mouth 3 times a day upon discharge through 18.    CRP ordered for a.m.     Current antimicrobials:     Rocephin 2 g IV Q24H on discharge de-escalated to Keflex 500mg PO TID through 18       Patient's findings and recommendations were discussed with patient, nursing staff, primary care team and consulting provider    Code Status:   Code Status and Medical Interventions:   Ordered at: 18 1559     Code Status:    CPR     Medical Interventions (Level of Support Prior to Arrest):    Full     Comments:    Any long term medical decisions to be made by her brother, Eric Oconnor who she reports to be her next of kin, in the event she can't decide for herself.       RADHA Kessler  08/15/18  9:21 AM         Electronically signed by Guzman Cervantes MD at 8/15/2018 12:52 PM     Nikko Chaudhary MD at 2018  8:25 PM              [facility] HOSPITALIST PROGRESS NOTE     Patient Identification:  Name:  Cynthia Ríos  Age:  33 y.o.  Sex:  female  :  1985  MRN:  [id number]  Visit Number:  [id number]  ROOM: [address] [place] [address]     Primary Care Provider:  Provider, No Known    Length of stay:  6    Subjective     Chief Complaint   Patient presents with   • Abscess     HPI:  33-year-old female admitted on 2018 with a swollen left ankle; she had excisional debridement and incision and drainage on 2018 with placement of a VAC dressing.  The patient continues to do well.  She says that her pain is controlled with the current pain medication.  She denies any new lesions anywhere.  She denies chest pain, denies trouble breathing, denies nausea, denies vomiting, and denies fevers.    Objective     Current Hospital Meds:  ceftriaxone 2 g Intravenous Q24H   famotidine 20 mg Oral Daily   ferrous sulfate 325 mg Oral Daily With Breakfast   heparin (porcine) 5,000 Units Subcutaneous Q12H   insulin aspart 0-9 Units Subcutaneous  4x Daily AC & at Bedtime   insulin aspart 10 Units Subcutaneous TID With Meals   insulin detemir 30 Units Subcutaneous Nightly   lactobacillus acidophilus 1 capsule Oral Daily   multivitamin 1 tablet Oral Daily   ----------------------------------------------------------------------------------------------------------------------  Vital Signs:  Temp:  [97.6 °F (36.4 °C)-98.8 °F (37.1 °C)] 98.2 °F (36.8 °C)  Heart Rate:  [76-91] 91  Resp:  [16-20] 18  BP: (126-153)/(74-93) 145/86  SpO2:  [94 %-98 %] 95 %    Device (Oxygen Therapy): room air  Body mass index is 29.88 kg/m².    Wt Readings from Last 3 Encounters:   08/08/18 79 kg (174 lb 1.6 oz)         Diet Regular; Consistent Carbohydrate  ----------------------------------------------------------------------------------------------------------------------  Physical exam:  Constitutional:  Well-developed and well-nourished.  No respiratory distress.      HENT:  Head:  Normocephalic and atraumatic.  Mouth:  Moist mucous membranes.    Eyes:  Conjunctivae and EOM are normal.  Pupils are equal, round, and reactive to light.  No scleral icterus.    Neck:  Neck supple.  No JVD present.    Cardiovascular:  Normal rate, regular rhythm and normal heart sounds with no murmur.  Pulmonary/Chest:  No respiratory distress, no wheezes, no crackles, with normal breath sounds and good air movement.  Abdominal:  Soft.  Bowel sounds are normal.  No distension and no tenderness.   Musculoskeletal:  No edema, no tenderness, and no deformity.  No red or swollen joints anywhere.    Neurological:  Alert and oriented to person, place, and time.  No cranial nerve deficit.  No tongue deviation.  No facial droop.  No slurred speech.   Skin:  Left lateral ankle with a fist sized incision at the malleolus with a wound VAC on top with no erythema and no black discoloration of the exposed skin.  No petechiae anywhere.  Peripheral vascular:  Strong pulses in all 4 extremities with no clubbing, no  cyanosis, no edema.  Genitourinary:  No robison catheter in place.  ----------------------------------------------------------------------------------------------------------------------  Tele:  No telemetry.  ----------------------------------------------------------------------------------------------------------------------  Results from last 7 days  Lab Units 08/14/18  0120 08/13/18  0111 08/12/18 0108 08/08/18  1153   CRP mg/dL 2.81* 3.80* 4.81*  < > 19.24*   LACTATE mmol/L  --   --   --   --  1.2   WBC 10*3/mm3 9.36 9.02 9.58  < > 21.04*   HEMOGLOBIN g/dL 11.2* 11.1* 11.0*  < > 13.3   HEMATOCRIT % 33.7* 33.3* 32.6*  < > 38.9   MCV fL 89.6 90.2 89.1  < > 88.2   MCHC g/dL 33.2 33.3 33.7  < > 34.2   PLATELETS 10*3/mm3 473* 478* 496*  < > 440*   < > = values in this interval not displayed.  Results from last 7 days  Lab Units 08/14/18  0120 08/13/18  0111 08/12/18  1904 08/12/18 0108 08/08/18  1153   SODIUM mmol/L 138 138  --  140  < > 135   POTASSIUM mmol/L 4.0 3.9 4.1 3.6  < > 3.9   MAGNESIUM mg/dL  --   --   --   --   --  2.2   CHLORIDE mmol/L 99 104  --  106  < > 102   CO2 mmol/L 33.1* 29.4  --  26.9  < > 22.3*   BUN mg/dL 10 8  --  7  < > 9   CREATININE mg/dL 0.71 0.48  --  0.65  < > 0.64   EGFR IF NONAFRICN AM mL/min/1.73 94 147  --  104  < > 106   CALCIUM mg/dL 8.8 8.6  --  8.2  < > 9.0   GLUCOSE mg/dL 221* 192*  --  196*  < > 275*   ALBUMIN g/dL 3.10* 3.00*  --  3.00*  < > 4.10   BILIRUBIN mg/dL 0.2 0.2  --  0.2  < > 0.6   ALK PHOS U/L 103 107*  --  117*  < > 110*   AST (SGOT) U/L 31* 39*  --  48*  < > 16   ALT (SGPT) U/L 47* 53*  --  47*  < > 16   < > = values in this interval not displayed.Estimated Creatinine Clearance: 112.4 mL/min (by C-G formula based on SCr of 0.71 mg/dL).      Glucose   Date/Time Value Ref Range Status   08/14/2018 1118 258 (H) 70 - 130 mg/dL Final   08/14/2018 0704 293 (H) 70 - 130 mg/dL Final   08/13/2018 1941 177 (H) 70 - 130 mg/dL Final   08/13/2018 1636 148 (H) 70 - 130  mg/dL Final   08/13/2018 1057 222 (H) 70 - 130 mg/dL Final   08/13/2018 0634 151 (H) 70 - 130 mg/dL Final   08/12/2018 2016 222 (H) 70 - 130 mg/dL Final   08/12/2018 1643 98 70 - 130 mg/dL Final     Results from last 7 days  Lab Units 08/08/18  1319   NITRITE UA  Negative   WBC UA /HPF 13-20*   BACTERIA UA /HPF 1+*   SQUAM EPITHEL UA /HPF 13-20*   URINECX  50,000-60,000 CFU/mL Normal Urogenital Kelle     Blood Culture   Date Value Ref Range Status   08/08/2018 No growth at 5 days  Final   08/08/2018 No growth at 5 days  Final      Urine Culture   Date Value Ref Range Status   08/08/2018 50,000-60,000 CFU/mL Normal Urogenital Kelle  Final     Wound Culture   Date Value Ref Range Status   08/08/2018 Heavy growth (4+) Staphylococcus aureus (A)  Final       I have personally looked at the labs and they are summarized above.  ----------------------------------------------------------------------------------------------------------------------  Imaging Results (last 24 hours)     ** No results found for the last 24 hours. **        I have personally reviewed the radiology images and read the final radiology report.    Assessment & Plan       -Sepsis that was present (heart rates were 110s to 120s, fever was 100.4, white blood cell count was 21,000) on admission due to left lateral malleolus cellulitis and abscess  -Sinus tachycardia related to sepsis and dehydration, now resolved  -Newly diagnosed type 2 diabetes mellitus with-anemia in the colon and hemoglobin A 1 level was 10.6  -Diarrhea reported in the ED that is now resolved  -History of seizure disorder  -Hypoalbuminemia  -Normocytic anemia  -Mildly liver enzyme elevation  -Overweight, BMI 29.88 kg/m²    Infectious diseases seen the patient and the patient will have antibiotics through 8/22/2018; from their perspective she can be discharged at any time.  Patient is worried about her wound not healing correctly.  Patient will likely go home with a wound VAC with  outpatient infusion clinic during the dressing changes.  We will consider discharging her tomorrow.  I will repeat her blood work in the morning.  She will also need some diabetes education as well.  For her type 2 diabetes, we will continue the Levemir 30 units nightly as well as the NovoLog 3 times a day to 10 units.  The patient is a new insulin start and as a result I do not want to drop her glucose level too quickly.  I will continue the current insulin regimen and continue bedside glucose monitoring closely.  I will ask the patient tomorrow about her primary care provider; in the past it was Kerry Delgado, nurse practitioner.    The patient is high risk due to the following diagnoses/reasons:  sepsis new-onset diabetes    Nikko Chaudhary MD  08/14/18  8:25 PM      Electronically signed by Nikko Chaudhary MD at 8/14/2018  8:42 PM       Consult Notes (last 24 hours) (Notes from 8/14/2018  2:02 PM through 8/15/2018  2:02 PM)     No notes of this type exist for this encounter.        All medication doses during the admission are shown, including meds that are no longer on order.   Scheduled Meds Sorted by Name   for Cynthia Ríos as of 08/15/18 1402     1 Day 3 Days 7 Days 10 Days < Today >    Legend:                           Inactive     Active     Other Encounter    Linked               Medications 08/06 08/07 08/08 08/09 08/10 08/11 08/12 08/13 08/14 08/15   cefTRIAXone (ROCEPHIN) 2 g/100 mL 0.9% NS VTB (DENISHA)  Dose: 2 g  Freq: Every 24 Hours Route: IV  Indications of Use: SKIN AND SOFT TISSUE INFECTION  Start: 08/08/18 1700 End: 08/19/18 1026    Admin Instructions:   Caution: Look alike/sound alike drug alert. Activate vial before using.      1622       1742       2017        1643        1747 1617        1645        1757        1721        1700          docusate sodium (COLACE) capsule 100 mg  Dose: 100 mg  Freq: 2 Times Daily Route: PO  Start: 08/14/18 2300    Admin Instructions:   Swallow whole.  Do not open, crush, or chew capsule.             0000       0907       2100          famotidine (PEPCID) tablet 20 mg  Dose: 20 mg  Freq: Daily Route: PO  Start: 08/10/18 1000        1233        0905        0813 0914 0905 0908          ferrous sulfate tablet 325 mg  Dose: 325 mg  Freq: Daily With Breakfast Route: PO  Start: 08/11/18 0900    Admin Instructions:   Swallow whole. Do not crush, split, or chew. Take with food if GI upset occurs.         0911        0813        0914        0905        0807          heparin (porcine) 5000 UNIT/ML injection 5,000 Units  Dose: 5,000 Units  Freq: Every 12 Hours Scheduled Route: SC  Indications Comment: Prophylaxis of Venous Thromboembolism  Start: 08/08/18 2100 1742 2017       (2100)        0849       1923 2019 0843       (2019)        0965       2120        0813 2023 0914 1949 2100 0905 2111 0908 2100          insulin aspart (novoLOG) injection 0-9 Units  Dose: 0-9 Units  Freq: 4 Times Daily Before Meals & Nightly Route: SC  Start: 08/09/18 1130    Admin Instructions:   Correction - Moderate Dose.  40-60 units/day total insulin dose or average weight, on oral agents    Blood glucose 150-199 mg/dL - 2 units  Blood glucose 200-249 mg/dL - 4 units  Blood glucose 250-299 mg/dL - 6 units  Blood glucose 300-349 mg/dL - 7 units  Blood glucose 350-400 mg/dL - 8 units  Blood glucose greater than 400 mg/dL - 9 units and call provider         1218       1641       1923       2019 0842       1230       1747       1940       2019        (0905)       1143       1707       2137        0813       (1139)       (1644)       2023        (0913)       1200       (1731)       1949 2100        0904       1213       1758)       2111        0757       1130       1730 2100          insulin aspart (novoLOG) injection 10 Units  Dose: 10 Units  Freq: 3 Times Daily With Meals Route:  SC  Start: 08/12/18 1200    Admin Instructions:             1139       1646 [C]       1708 (2914) [id number]        0916       1214       1726        0800       1200       1800          insulin aspart (novoLOG) injection 5 Units  Dose: 5 Units  Freq: 3 Times Daily With Meals Route: SC  Start: 08/10/18 1200 End: 08/11/18 1227    Admin Instructions:           1231       1746        0990       1143       1227-D/C'd          insulin aspart (novoLOG) injection 7 Units  Dose: 7 Units  Freq: 3 Times Daily With Meals Route: SC  Start: 08/11/18 1800 End: 08/12/18 1009    Admin Instructions:            1706        0814       1009-D/C'd         insulin detemir (LEVEMIR) injection 10 Units  Dose: 10 Units  Freq: Nightly Route: SC  Start: 08/09/18 2100 End: 08/10/18 0806    Admin Instructions:          1925 2020 0806-D/C'd           insulin detemir (LEVEMIR) injection 15 Units  Dose: 15 Units  Freq: Nightly Route: SC  Start: 08/10/18 2100 End: 08/10/18 1553    Admin Instructions:           1553-D/C'd           insulin detemir (LEVEMIR) injection 20 Units  Dose: 20 Units  Freq: Nightly Route: SC  Start: 08/10/18 2100 End: 08/12/18 1009    Admin Instructions:           1940 2020 2138        1009-D/C'd         insulin detemir (LEVEMIR) injection 25 Units  Dose: 25 Units  Freq: Nightly Route: SC  Start: 08/12/18 2100 End: 08/13/18 1231    Admin Instructions:             2024        1231-D/C'd        insulin detemir (LEVEMIR) injection 30 Units  Dose: 30 Units  Freq: Nightly Route: SC  Start: 08/13/18 2100    Admin Instructions:              1950 2353 2110        2100          lactobacillus acidophilus (RISAQUAD) capsule 1 capsule  Dose: 1 capsule  Freq: Daily Route: PO  Start: 08/09/18 1230       1223        0843        0905        0813        0914        0905        0907          metFORMIN (GLUCOPHAGE) tablet 500 mg  Dose: 500 mg  Freq: 2 Times Daily With Meals Route:  PO  Start: 08/09/18 1800 End: 08/09/18 1222    Admin Instructions:   Do not give at time of or within 48 hours of iodinated intravenous contrast. Confirm renal function is normal before continuing.       1222-D/C'd            multivitamin (DAILY MARY) tablet 1 tablet  Dose: 1 tablet  Freq: Daily Route: PO  Start: 08/11/18 1500         1618        0813        0914        0905        0907          piperacillin-tazobactam (ZOSYN) 3.375 g/100 mL 0.9% NS IVPB (mbp)  Dose: 3.375 g  Freq: Every 8 Hours Route: IV  Indications of Use: SEPSIS,SKIN AND SOFT TISSUE INFECTION  Start: 08/08/18 1800 End: 08/08/18 1534    Order specific questions:   Request for Conventional Infusion? No, continue with extended infusion        1534-D/C'd             piperacillin-tazobactam (ZOSYN) 3.375 g/100 mL 0.9% NS IVPB (mbp)  Dose: 3.375 g  Freq: Once Route: IV  Indications of Use: SEPSIS  Last Dose: Stopped (08/08/18 1245)  Start: 08/08/18 1125 End: 08/08/18 1245      1213       1245                 potassium chloride (K-DUR,KLOR-CON) CR tablet 40 mEq  Dose: 40 mEq  Freq: Every 4 Hours Route: PO  Start: 08/12/18 0500 End: 08/12/18 0813    Admin Instructions:   Potassium replacement    Oral (may give capsule or powder packet)  If K+ less than or equal to 3.1 give KCl 40 mEq q4h x 3 doses  If K+ 3.2-3.6 give KCl 40 mEq q4h x 2 doses    Peripheral IV  If K+ less than or equal to 3.1 give KCl 10 mEq/100 mL NS IV q1h x 6 doses  If K+ 3.2-3.6 give KCl 10 mEq/100 mL NS q1h x 4 doses    Central Line  If K+ less than or equal to 3.1 give KCl 20 mEq/50 mL NS IV q1h x 3 doses  If K+ 3.2-3.6 give KCl 20 mEq/50 mL NS q1h x 2 doses    Check potassium 4 hours after last dose given.  Check magnesium if K stays low after replacement.  DO NOT GIVE if CrCl is less than 30 mL/minute or urine output is less than 30 mL/hr          [id number]             potassium chloride (K-DUR,KLOR-CON) CR tablet 40 mEq  Dose: 40 mEq  Freq: Every 4 Hours Route: PO  Start:  08/10/18 0900 End: 08/10/18 1233        0849       1233               vancomycin (VANCOCIN) 1,250 mg in sodium chloride 0.9 % 250 mL IVPB  Dose: 1,250 mg  Freq: Every 12 Hours Route: IV  Indications of Use: SEPSIS,SKIN AND SOFT TISSUE INFECTION  Start: 08/09/18 0000 End: 08/10/18 1219      1742       2017        0003       1223       2333        1200       1219-D/C'd           vancomycin (VANCOCIN) 1,500 mg in sodium chloride 0.9 % 500 mL IVPB  Dose: 20 mg/kg  Weight Dosing Info: 79.4 kg  Freq: Once Route: IV  Indications of Use: SEPSIS  Last Dose: 1,500 mg (08/08/18 1249)  Start: 08/08/18 1130 End: 08/08/18 1529      1249                 Medications 08/06 08/07 08/08 08/09 08/10 08/11 08/12 08/13 08/14 08/15       Continuous Meds Sorted by Name   for Cynthia Ríos as of 08/15/18 1402    Legend:                           Inactive     Active     Other Encounter    Linked               Medications 08/06 08/07 08/08 08/09 08/10 08/11 08/12 08/13 08/14 08/15   lactated ringers infusion  Rate: 125 mL/hr Dose: 125 mL/hr  Freq: Continuous Route: IV  Start: 08/08/18 1812 End: 08/09/18 1051      1832       1915        1051-D/C'd            Pharmacy Consult - Pharmacy to dose  Freq: Continuous PRN Route: XX  PRN Reason: Consult  Start: 08/08/18 1407 End: 08/08/18 1443    Order specific questions:   Pharmacy to Dose: vancomycin  Indication of Use Sepsis/cellulitis left ankle.        1443-D/C'd             Pharmacy Consult - Pharmacy to dose  Freq: Continuous PRN Route: XX  PRN Reason: Consult  Start: 08/08/18 1407 End: 08/08/18 1443    Order specific questions:   Pharmacy to Dose: [email or web address]  Indication of Use Sepsis/Cellulitis left ankle.        1443-D/C'd             propofol (DIPRIVAN) infusion 10 mg/mL 100 mL  Freq: Continuous PRN Route: IV  Last Dose: 100 mcg/kg/min (08/08/18 1836)  Start: 08/08/18 1836 End: 08/08/18 1916 1836 1916-D/C'd             sodium chloride 0.9 % infusion  Rate: 75 mL/hr Dose: 75  mL/hr  Freq: Continuous Route: IV  Last Dose: 75 mL/hr (08/10/18 1235)  Start: 08/08/18 1500 End: 08/11/18 0735      1448        0437       1518       1923        0957       1235       1850        0735-D/C'd          Medications 08/06 08/07 08/08 08/09 08/10 08/11 08/12 08/13 08/14 08/15       PRN Meds Sorted by Name   for Cynthia Ríos as of 08/15/18 1402    Legend:                           Inactive     Active     Other Encounter    Linked               Medications 08/06 08/07 08/08 08/09 08/10 08/11 08/12 08/13 08/14 08/15   acetaminophen (TYLENOL) tablet 650 mg  Dose: 650 mg  Freq: Every 6 Hours PRN Route: PO  PRN Reasons: Mild Pain ,Fever  Start: 08/09/18 0445 End: 08/10/18 0832    Admin Instructions:   Do not exceed 4 grams of acetaminophen in a 24 hr period.    If given for pain, use the following pain scale:   Mild Pain = Pain Score of 1-3, CPOT 1-2  Moderate Pain = Pain Score of 4-6, CPOT 3-4  Severe Pain = Pain Score of 7-10, CPOT 5-8       0555        0832-D/C'd           dextrose (D50W) solution 25 g  Dose: 25 g  Freq: Every 15 Minutes PRN Route: IV  PRN Reason: Low Blood Sugar  PRN Comment: Blood Sugar Less Than 70  Start: 08/09/18 0751    Admin Instructions:   Blood sugar less than 70; patient has IV access - Unresponsive, NPO or Unable To Safely Swallow                dextrose (D50W) solution 25 g  Dose: 25 g  Freq: Every 15 Minutes PRN Route: IV  PRN Reason: Low Blood Sugar  PRN Comment: Blood Sugar Less Than 70  Start: 08/08/18 1500    Admin Instructions:   Blood sugar less than 70; patient has IV access - Unresponsive, NPO or Unable To Safely Swallow      1742 2017                 dextrose (GLUTOSE) oral gel 15 g  Dose: 15 g  Freq: Every 15 Minutes PRN Route: PO  PRN Reason: Low Blood Sugar  PRN Comment: Blood sugar less than 70  Start: 08/09/18 0751    Admin Instructions:   BS<70, Patient Alert, Is not NPO, Can safely swallow.                dextrose (GLUTOSE) oral gel 15 g  Dose: 15  g  Freq: Every 15 Minutes PRN Route: PO  PRN Reason: Low Blood Sugar  PRN Comment: Blood sugar less than 70  Start: 08/08/18 1500    Admin Instructions:   BS<70, Patient Alert, Is not NPO, Can safely swallow.      1742 2017                 fentaNYL citrate (PF) (SUBLIMAZE) injection  Freq: As Needed Route: IV  PRN Reason: Severe Pain   Start: 08/08/18 1832 End: 08/08/18 1916 1832 1916-D/C'd             glucagon (human recombinant) (GLUCAGEN DIAGNOSTIC) injection 1 mg  Dose: 1 mg  Freq: As Needed Route: SC  PRN Comment: Blood Glucose Less Than 70  Start: 08/09/18 0751    Admin Instructions:   Blood Glucose Less Than 70 - Patient Without IV Access - Unresponsive, NPO or Unable To Safely Swallow                glucagon (human recombinant) (GLUCAGEN DIAGNOSTIC) injection 1 mg  Dose: 1 mg  Freq: As Needed Route: SC  PRN Comment: Blood Glucose Less Than 70  Start: 08/08/18 1500    Admin Instructions:   Blood Glucose Less Than 70 - Patient Without IV Access - Unresponsive, NPO or Unable To Safely Swallow      1742 2017                 ibuprofen (ADVIL,MOTRIN) tablet 200 mg  Dose: 200 mg  Freq: Every 4 Hours PRN Route: PO  PRN Reason: Mild Pain   Start: 08/10/18 0832    Admin Instructions:   If given for pain, use the following pain scale:  Mild Pain = Pain Score of 1-3, CPOT 1-2  Moderate Pain = Pain Score of 4-6, CPOT 3-4  Severe Pain = Pain Score of 7-10, CPOT 5-8         0439        0813        0914       2345        2110        0912          Magnesium Sulfate 2 gram Bolus, followed by 8 gram infusion (total Mg dose 10 grams)- Mg less than or equal to 1mg/dL  Dose: 2 g  Freq: As Needed Route: IV  PRN Comment: See Administration Instructions  Start: 08/10/18 0749    Admin Instructions:   Mg less than or equal to 1mg/dL. Give 2 gm over 30 minutes as bolus, then infuse 2 gm over 2 hours for 4 doses (8 grams) for total dose of 10 grams.  Recheck Mg levels in the AM.                Or  Magnesium  Sulfate 2 gram / 50mL Infusion (GIVE X 3 BAGS TO EQUAL 6GM TOTAL DOSE) - Mg 1.1 - 1.5 mg/dl  Dose: 2 g  Freq: As Needed Route: IV  PRN Comment: See Administration Instructions  Start: 08/10/18 0749    Admin Instructions:   Mg 1.1 -1.5 mg/dL. Infuse 2 grams over 2 hours for 3 doses (for a total Mg dose of 6 grams).  Recheck Mg level in the AM.                Or  Magnesium Sulfate 4 gram infusion- Mg 1.6-1.9 mg/dL  Dose: 4 g  Freq: As Needed Route: IV  PRN Comment: See Administration Instructions  Start: 08/10/18 0749    Admin Instructions:   Mg 1.6-1.9 mg/dL. Recheck Mg level in the AM.                midazolam (VERSED) injection  Freq: As Needed Route: IV  Start: 08/08/18 1832 End: 08/08/18 1916 1832 1916-D/C'd             morphine injection 2 mg  Dose: 2 mg  Freq: Every 4 Hours PRN Route: IV  PRN Reason: Severe Pain   Start: 08/10/18 0949 End: 08/20/18 0948    Admin Instructions:   Hold for SBP less than 110.  Hold for sedation/respiratory distress.  If given for pain, use the following pain scale:  Mild Pain = Pain Score of 1-3, CPOT 1-2  Moderate Pain = Pain Score of 4-6, CPOT 3-4  Severe Pain = Pain Score of 7-10, CPOT 5-8        1122 [C]               nitroglycerin (NITROSTAT) SL tablet 0.4 mg  Dose: 0.4 mg  Freq: Every 5 Minutes PRN Route: SL  PRN Reason: Chest Pain  PRN Comment: if systolic BP greater than 100 mm/Hg.  Start: 08/08/18 1407    Admin Instructions:   If pain unrelieved after 3 doses, notifcrista DUGAN      7363       2017                 ondansetron (ZOFRAN) injection  Freq: As Needed Route: IV  PRN Reasons: Nausea,Vomiting  Start: 08/08/18 1832 End: 08/08/18 1916 1832 1916-D/C'd             Pharmacy Consult - Pharmacy to dose  Freq: Continuous PRN Route: XX  PRN Reason: Consult  Start: 08/08/18 1407 End: 08/08/18 1443    Order specific questions:   Pharmacy to Dose: [email or web address]  Indication of Use Sepsis/cellulitis left ankle.        1443-D/C'd             Pharmacy Consult -  Pharmacy to dose  Freq: Continuous PRN Route: XX  PRN Reason: Consult  Start: 08/08/18 1407 End: 08/08/18 1443    Order specific questions:   Pharmacy to Dose: [email or web address]  Indication of Use Sepsis/Cellulitis left ankle.        1443-D/C'd             potassium chloride (MICRO-K) CR capsule 40 mEq  Dose: 40 mEq  Freq: As Needed Route: PO  PRN Comment: potassium replacement.  see admin instructions  Start: 08/10/18 0749    Admin Instructions:   Potassium replacement    Oral (may give capsule or powder packet)  If K+ less than or equal to 3.1 give KCl 40 mEq q4h x 3 doses  If K+ 3.2-3.6 give KCl 40 mEq q4h x 2 doses    Peripheral IV  If K+ less than or equal to 3.1 give KCl 10 mEq/100 mL NS IV q1h x 6 doses  If K+ 3.2-3.6 give KCl 10 mEq/100 mL NS q1h x 4 doses    Central Line  If K+ less than or equal to 3.1 give KCl 20 mEq/50 mL NS IV q1h x 3 doses  If K+ 3.2-3.6 give KCl 20 mEq/50 mL NS q1h x 2 doses    Check potassium 4 hours after last dose given.  Check magnesium if K stays low after replacement.  DO NOT GIVE if CrCl is less than 30 mL/minute or urine output is less than 30 mL/hr                Or  potassium chloride (KLOR-CON) packet 40 mEq  Dose: 40 mEq  Freq: As Needed Route: PO  PRN Comment: potassium replacement, see admin instructions  Start: 08/10/18 0749    Admin Instructions:   Potassium replacement    Oral (may give capsule or powder packet)  If K+ less than or equal to 3.1 give KCl 40 mEq q4h x 3 doses  If K+ 3.2-3.6 give KCl 40 mEq q4h x 2 doses    Peripheral IV  If K+ less than or equal to 3.1 give KCl 10 mEq/100 mL NS IV q1h x 6 doses  If K+ 3.2-3.6 give KCl 10 mEq/100 mL NS q1h x 4 doses    Central Line  If K+ less than or equal to 3.1 give KCl 20 mEq/50 mL NS IV q1h x 3 doses  If K+ 3.2-3.6 give KCl 20 mEq/50 mL NS q1h x 2 doses    Check potassium 4 hours after last dose given.  Check magnesium if K stays low after replacement.  DO NOT GIVE if CrCl is less than 30 mL/minute or urine output is less than  30 mL/hr                Or  potassium chloride 10 mEq in 100 mL IVPB  Dose: 10 mEq  Freq: Every 1 Hour PRN Route: IV  PRN Comment: potassium protocol PERIPHERAL - see admin instructions  Start: 08/10/18 0749    Admin Instructions:   Potassium replacement - patient NPO or cannot tolerate oral potassium    Peripheral or Central IV  If K+ less than or equal to 3.1 give KCl 10 mEq/100 mL NS IV q1h x 6 doses  If K+ 3.2-3.6 give KCl 10 mEq/100 mL NS q1h x 4 doses    Check potassium 4 hours after last dose given.  Check magnesium if K stays low after replacement.  DO NOT GIVE if CrCl is less than 30 mL/minute or urine output is less than 30 mL/hr. Rates greater than 10mEq/hr require ECG monitoring.                propofol (DIPRIVAN) infusion 10 mg/mL 100 mL  Freq: Continuous PRN Route: IV  Start: 08/08/18 1836 End: 08/08/18 1916 1836 1916-D/C'd             Propofol (DIPRIVAN) injection  Freq: As Needed Route: IV  Start: 08/08/18 1836 End: 08/08/18 1916 1836 1916-D/C'd             sodium chloride 0.9 % flush 1-10 mL  Dose: 1-10 mL  Freq: As Needed Route: IV  PRN Reason: Line Care  Start: 08/08/18 1810 End: 08/08/18 1908 1908-D/C'd             sodium chloride 0.9 % flush 1-10 mL  Dose: 1-10 mL  Freq: As Needed Route: IV  PRN Reason: Line Care  Start: 08/08/18 1407      1742       2017                 sodium chloride 0.9 % flush 10 mL  Dose: 10 mL  Freq: As Needed Route: IV  PRN Reason: Line Care  Start: 08/08/18 1121      1742       2017                 sodium chloride 1,000 mL with vancomycin 1,000 mg irrigation  Freq: As Needed  Start: 08/08/18 1855 End: 08/08/18 1916 1855 1916-D/C'd             sterile water irrigation solution  Freq: As Needed  Start: 08/08/18 1855 End: 08/08/18 1916 1855 1916-D/C'd             Medications 08/06 08/07 08/08 08/09 08/10 08/11 08/12 08/13 08/14 08/15

## 2018-08-15 NOTE — PROGRESS NOTES
[facility] HOSPITALIST PROGRESS NOTE     Patient Identification:  Name:  Cynthia Ríos  Age:  33 y.o.  Sex:  female  :  1985  MRN:  [id number]  Visit Number:  [id number]  ROOM: [address] [place] [address]     Primary Care Provider:  Provider, No Known    Length of stay:  6    Subjective     Chief Complaint   Patient presents with   • Abscess     HPI:  33-year-old female admitted on 2018 with a swollen left ankle; she had excisional debridement and incision and drainage on 2018 with placement of a VAC dressing.  The patient continues to do well.  She says that her pain is controlled with the current pain medication.  She denies any new lesions anywhere.  She denies chest pain, denies trouble breathing, denies nausea, denies vomiting, and denies fevers.    Objective     Current Hospital Meds:  ceftriaxone 2 g Intravenous Q24H   famotidine 20 mg Oral Daily   ferrous sulfate 325 mg Oral Daily With Breakfast   heparin (porcine) 5,000 Units Subcutaneous Q12H   insulin aspart 0-9 Units Subcutaneous 4x Daily AC & at Bedtime   insulin aspart 10 Units Subcutaneous TID With Meals   insulin detemir 30 Units Subcutaneous Nightly   lactobacillus acidophilus 1 capsule Oral Daily   multivitamin 1 tablet Oral Daily   ----------------------------------------------------------------------------------------------------------------------  Vital Signs:  Temp:  [97.6 °F (36.4 °C)-98.8 °F (37.1 °C)] 98.2 °F (36.8 °C)  Heart Rate:  [76-91] 91  Resp:  [16-20] 18  BP: (126-153)/(74-93) 145/86  SpO2:  [94 %-98 %] 95 %    Device (Oxygen Therapy): room air  Body mass index is 29.88 kg/m².    Wt Readings from Last 3 Encounters:   08/08/18 79 kg (174 lb 1.6 oz)         Diet Regular; Consistent Carbohydrate  ----------------------------------------------------------------------------------------------------------------------  Physical exam:  Constitutional:  Well-developed and well-nourished.  No respiratory distress.      HENT:  Head:   Normocephalic and atraumatic.  Mouth:  Moist mucous membranes.    Eyes:  Conjunctivae and EOM are normal.  Pupils are equal, round, and reactive to light.  No scleral icterus.    Neck:  Neck supple.  No JVD present.    Cardiovascular:  Normal rate, regular rhythm and normal heart sounds with no murmur.  Pulmonary/Chest:  No respiratory distress, no wheezes, no crackles, with normal breath sounds and good air movement.  Abdominal:  Soft.  Bowel sounds are normal.  No distension and no tenderness.   Musculoskeletal:  No edema, no tenderness, and no deformity.  No red or swollen joints anywhere.    Neurological:  Alert and oriented to person, place, and time.  No cranial nerve deficit.  No tongue deviation.  No facial droop.  No slurred speech.   Skin:  Left lateral ankle with a fist sized incision at the malleolus with a wound VAC on top with no erythema and no black discoloration of the exposed skin.  No petechiae anywhere.  Peripheral vascular:  Strong pulses in all 4 extremities with no clubbing, no cyanosis, no edema.  Genitourinary:  No robison catheter in place.  ----------------------------------------------------------------------------------------------------------------------  Tele:  No telemetry.  ----------------------------------------------------------------------------------------------------------------------  Results from last 7 days  Lab Units 08/14/18  0120 08/13/18  0111 08/12/18  0108  08/08/18  1153   CRP mg/dL 2.81* 3.80* 4.81*  < > 19.24*   LACTATE mmol/L  --   --   --   --  1.2   WBC 10*3/mm3 9.36 9.02 9.58  < > 21.04*   HEMOGLOBIN g/dL 11.2* 11.1* 11.0*  < > 13.3   HEMATOCRIT % 33.7* 33.3* 32.6*  < > 38.9   MCV fL 89.6 90.2 89.1  < > 88.2   MCHC g/dL 33.2 33.3 33.7  < > 34.2   PLATELETS 10*3/mm3 473* 478* 496*  < > 440*   < > = values in this interval not displayed.  Results from last 7 days  Lab Units 08/14/18  0120 08/13/18  0111 08/12/18  1904 08/12/18  0108  08/08/18  1153   SODIUM mmol/L  138 138  --  140  < > 135   POTASSIUM mmol/L 4.0 3.9 4.1 3.6  < > 3.9   MAGNESIUM mg/dL  --   --   --   --   --  2.2   CHLORIDE mmol/L 99 104  --  106  < > 102   CO2 mmol/L 33.1* 29.4  --  26.9  < > 22.3*   BUN mg/dL 10 8  --  7  < > 9   CREATININE mg/dL 0.71 0.48  --  0.65  < > 0.64   EGFR IF NONAFRICN AM mL/min/1.73 94 147  --  104  < > 106   CALCIUM mg/dL 8.8 8.6  --  8.2  < > 9.0   GLUCOSE mg/dL 221* 192*  --  196*  < > 275*   ALBUMIN g/dL 3.10* 3.00*  --  3.00*  < > 4.10   BILIRUBIN mg/dL 0.2 0.2  --  0.2  < > 0.6   ALK PHOS U/L 103 107*  --  117*  < > 110*   AST (SGOT) U/L 31* 39*  --  48*  < > 16   ALT (SGPT) U/L 47* 53*  --  47*  < > 16   < > = values in this interval not displayed.Estimated Creatinine Clearance: 112.4 mL/min (by C-G formula based on SCr of 0.71 mg/dL).      Glucose   Date/Time Value Ref Range Status   08/14/2018 1118 258 (H) 70 - 130 mg/dL Final   08/14/2018 0704 293 (H) 70 - 130 mg/dL Final   08/13/2018 1941 177 (H) 70 - 130 mg/dL Final   08/13/2018 1636 148 (H) 70 - 130 mg/dL Final   08/13/2018 1057 222 (H) 70 - 130 mg/dL Final   08/13/2018 0634 151 (H) 70 - 130 mg/dL Final   08/12/2018 2016 222 (H) 70 - 130 mg/dL Final   08/12/2018 1643 98 70 - 130 mg/dL Final     Results from last 7 days  Lab Units 08/08/18  1319   NITRITE UA  Negative   WBC UA /HPF 13-20*   BACTERIA UA /HPF 1+*   SQUAM EPITHEL UA /HPF 13-20*   URINECX  50,000-60,000 CFU/mL Normal Urogenital Kelle     Blood Culture   Date Value Ref Range Status   08/08/2018 No growth at 5 days  Final   08/08/2018 No growth at 5 days  Final      Urine Culture   Date Value Ref Range Status   08/08/2018 50,000-60,000 CFU/mL Normal Urogenital Kelle  Final     Wound Culture   Date Value Ref Range Status   08/08/2018 Heavy growth (4+) Staphylococcus aureus (A)  Final       I have personally looked at the labs and they are summarized  above.  ----------------------------------------------------------------------------------------------------------------------  Imaging Results (last 24 hours)     ** No results found for the last 24 hours. **        I have personally reviewed the radiology images and read the final radiology report.    Assessment & Plan      -Sepsis that was present (heart rates were 110s to 120s, fever was 100.4, white blood cell count was 21,000) on admission due to left lateral malleolus cellulitis and abscess  -Sinus tachycardia related to sepsis and dehydration, now resolved  -Newly diagnosed type 2 diabetes mellitus with-anemia in the colon and hemoglobin A 1 level was 10.6  -Diarrhea reported in the ED that is now resolved  -History of seizure disorder  -Hypoalbuminemia  -Normocytic anemia  -Mildly liver enzyme elevation  -Overweight, BMI 29.88 kg/m²    Infectious diseases seen the patient and the patient will have antibiotics through 8/22/2018; from their perspective she can be discharged at any time.  Patient is worried about her wound not healing correctly.  Patient will likely go home with a wound VAC with outpatient infusion clinic during the dressing changes.  We will consider discharging her tomorrow.  I will repeat her blood work in the morning.  She will also need some diabetes education as well.  For her type 2 diabetes, we will continue the Levemir 30 units nightly as well as the NovoLog 3 times a day to 10 units.  The patient is a new insulin start and as a result I do not want to drop her glucose level too quickly.  I will continue the current insulin regimen and continue bedside glucose monitoring closely.  I will ask the patient tomorrow about her primary care provider; in the past it was Kerry Delgado, nurse practitioner.    The patient is high risk due to the following diagnoses/reasons:  sepsis new-onset diabetes    Nikko Chaudhary MD  08/14/18  8:25 PM

## 2018-08-15 NOTE — NURSING NOTE
NPWT VAC dressing change complete.       08/15/18 1305   Wound 08/08/18 1445 Left other (see comments) ankle abscess   Date first assessed/Time first assessed: 08/08/18 1445   Present On Admission : yes  Side: Left  Orientation: (c) other (see comments)  Location: ankle  Type: abscess   Base red/granulating   Periwound intact   Periwound Temperature warm   Periwound Skin Turgor soft   Drainage Characteristics/Odor serosanguineous   Drainage Amount small   NPWT (Negative Pressure Wound Therapy) 08/08/18 Left lateral ankle   Placement Date: 08/08/18   Location: Left lateral ankle   Therapy Setting continuous therapy   Dressing foam, black   Pressure Setting 125 mmHg   Sponges Inserted 3   Sponges Removed 3   Finger sweep complete Yes

## 2018-08-15 NOTE — DISCHARGE SUMMARY
[facility] HOSPITALISTS DISCHARGE SUMMARY    Patient Identification:  Name:  Cynthia Ríos  Age:  33 y.o.  Sex:  female  :  1985  MRN:  [id number]  Visit Number:  [id number]    Date of Admission: 2018  Date of Discharge:  8/15/2018     PCP: Provider, No Known    DISCHARGE DIAGNOSIS  -Sepsis that was present (heart rates were 110s to 120s, fever was 100.4, white blood cell count was 21,000) on admission due to left lateral malleolus cellulitis and abscess  -Sinus tachycardia related to sepsis and dehydration, now resolved  -Newly diagnosed type 2 diabetes mellitus with-anemia in the colon and hemoglobin A 1 level was 10.6  -Diarrhea reported in the ED that is now resolved  -History of seizure disorder  -Hypoalbuminemia  -Normocytic anemia  -Mildly liver enzyme elevation  -Overweight, BMI 29.88 kg/m²    CONSULTS   Dr. Cervantes with infectious disease  RADHA David and Dr. Artis with orthopedic surgery    PROCEDURES PERFORMED  2018 Incision and drainage, excisional debridement of the lateral left ankle    HOSPITAL COURSE  Patient is a 33 y.o. female presented to [facility] complaining of pain, swelling, and redness of the right ankle; she was found to have an open wound in the area in question and she was septic.  She was admitted to the medical surgical floor for further evaluation and treatment.  Please see the admitting history and physical for further details.  Blood and wound cultures were obtained in the ED and she was empirically started on vancomycin and zosyn; infectious disease was consulted.  They changed the antibiotics to high dose rocephin and vancomycin.  Orthopedic surgery was consulted and stated that the patient need incision and draniage and in the OR necrotic tissue was seen so excisional debridement also occurred.  Wound culture grew MSSA and blood cultures did not grow anything.  Ultrasound did not show a DVT.  Dr. Artis did not see any bone or joint  infection during surgery and thus it was suggested finishing treatment with oral keflex through 8/22/2018.  The wound will be managed with a wound vac system, which will be changed in the infusion clinic on Monday, Wednesday, and Friday.      On day of discharge, the patient was doing well with no fevers, limited left ankle pain, and no chest pain.  She was able to do all of her activities of daily living.  She was     She is going to try to establish care with Dr. Indio Hodge at [facility].        VITAL SIGNS:  Temp:  [98.2 °F (36.8 °C)-98.9 °F (37.2 °C)] 98.4 °F (36.9 °C)  Heart Rate:  [76-91] 80  Resp:  [16-18] 18  BP: (126-153)/(78-93) 126/78  SpO2:  [94 %-97 %] 94 %   Device (Oxygen Therapy): room air    Body mass index is 29.88 kg/m².  Wt Readings from Last 3 Encounters:   08/08/18 79 kg (174 lb 1.6 oz)       PHYSICAL EXAM:  Constitutional:  Well-developed and well-nourished.  No respiratory distress.      HENT:  Head:  Normocephalic and atraumatic.  Mouth:  Moist mucous membranes.    Eyes:  Conjunctivae and EOM are normal.  Pupils are equal, round, and reactive to light.  No scleral icterus.    Neck:  Neck supple.  No JVD present.    Cardiovascular:  Normal rate, regular rhythm and normal heart sounds with no murmur.  Pulmonary/Chest:  No respiratory distress, no wheezes, no crackles, with normal breath sounds and good air movement.  Abdominal:  Soft.  Bowel sounds are normal.  No distension and no tenderness.   Musculoskeletal:  No edema, no tenderness, and no deformity.  No red or swollen joints anywhere.    Neurological:  Alert and oriented to person, place, and time.  No cranial nerve deficit.  No tongue deviation.  No facial droop.  No slurred speech.   Skin:  Left lateral ankle with a fist sized incision at the malleolus with a wound VAC on top with no erythema and no black discoloration of the exposed skin.  No petechiae anywhere.  Peripheral vascular:  Strong pulses in all 4  extremities with no clubbing, no cyanosis, no edema.  Genitourinary:  No robison catheter in place.      DISCHARGE DISPOSITION   Stable    DISCHARGE MEDICATIONS:  New Medications      Instructions Start Date   cephalexin 500 MG capsule  Commonly known as:  KEFLEX   500 mg, Oral, 3 Times Daily      docusate sodium 100 MG capsule   100 mg, Oral, 2 Times Daily      famotidine 20 MG tablet  Commonly known as:  PEPCID   20 mg, Oral, Daily      ferrous sulfate 325 (65 FE) MG tablet   325 mg, Oral, Daily With Breakfast      ibuprofen 200 MG tablet  Commonly known as:  ADVIL,MOTRIN   200 mg, Oral, Every 4 Hours PRN      insulin aspart 100 UNIT/ML injection  Commonly known as:  novoLOG   10 Units, Subcutaneous, 3 Times Daily With Meals      insulin aspart 100 UNIT/ML injection  Commonly known as:  novoLOG   150-199 mg/dL-2 u 200-249 mg/dL-4 u 250-299 mg/dL-6 u 300-349 mg/dL-7 u 350-400 mg/dL-8 u, >400 mg/dL - 9 u & call MD      insulin detemir 100 UNIT/ML injection  Commonly known as:  LEVEMIR   30 Units, Subcutaneous, Nightly      multivitamin tablet tablet   1 tablet, Oral, Daily          Diet Instructions     Diet: Regular, Consistent Carbohydrate; Thin       Discharge Diet:   Regular, Consistent Carbohydrate       Fluid Consistency:  Thin        Activity Instructions     Activity as Tolerated           Additional Instructions for the Follow-ups that You Need to Schedule     Ambulatory Referral to [facility] For Infusion Treatment    As directed    Place medication orders in the Therapy Plan section of [facility].    Needs wound vac changes on Monday, Wednesday, and Friday; she has been seen and treated by Yamilet Cheng RN while hospitalized    What is the duration of the infusion treatment?:  8 Hr             Discharge Follow-up with PCP    As directed    Currently Documented PCP:    Follow Up Details:  7 days; she was given a list of providers to follow up with             Discharge Follow-up with Specified Provider: Dr. Artis; 1 Week     As directed    To:  Dr. Artis    Follow Up:  1 Week             Follow-up Information     Provider, No Known .    Why:  7 days; she was given a list of providers to follow up with  Contact information:  [facility] [place] [address]           [facility] OUTPATIENT INFUSION NON ONCOLOGY .    Specialty:  Infusion Therapy  Contact information:  [address] [place] [id number] [phone]             TEST  RESULTS PENDING AT DISCHARGE:   Order Current Status    Neisseria Gonorrhoea Antibodies In process           CODE STATUS  Code Status and Medical Interventions:   Ordered at: 08/08/18 1559     Code Status:    CPR     Medical Interventions (Level of Support Prior to Arrest):    Full     Comments:    Any long term medical decisions to be made by her brother, Eric Oconnor who she reports to be her next of kin, in the event she can't decide for herself.       Nikko Chaudhary MD  08/15/18  10:57 AM    Please note that this discharge summary required more than 30 minutes to complete.    Please send a copy of this dictation to the following providers:  Provider, No Known

## 2018-08-16 ENCOUNTER — TRANSITIONAL CARE MANAGEMENT TELEPHONE ENCOUNTER (OUTPATIENT)
Dept: FAMILY MEDICINE CLINIC | Facility: CLINIC | Age: 33
End: 2018-08-16

## 2018-08-16 NOTE — OUTREACH NOTE
Called pt and completed TCM. Pt answered all questions and verbalized understanding. Pt confirmed appt for 8/23/18 @930 with Kerry, and pt is aware to bring all meds to appt.

## 2018-08-16 NOTE — PAYOR COMM NOTE
"Contact: La Del Toro RN @ [facility]  Phone: [phone]  Fax: [phone]    Auth# [id number]  Patient discharged home on 8/15/18    Cynthia Rizo  (33 y.o. Female)     Date of Birth Social Security Number Address Home Phone MRN    1985  [address] [facility] [address] [phone] [id number]    [facility] Status          None Single       Admission Date Admission Type Admitting Provider Attending Provider Department, Room/Bed    8/8/18 Emergency Manolo Tripathi MD  [address], [address]    Discharge Date Discharge Disposition Discharge Destination        8/15/2018 Home or Self Care              Attending Provider:  (none)   Allergies:  No Known Allergies    Isolation:  None   Infection:  None   Code Status:  Prior    Ht:  162.6 cm (64\")   Wt:  79 kg (174 lb 1.6 oz)    Admission Cmt:  None   Principal Problem:  Cellulitis of left ankle [L03.116] More...                 Active Insurance as of 8/8/2018     Primary Coverage     Payor Plan Insurance Group Employer/Plan Group    [employer] [id number]     Payor Plan Address Payor Plan Phone Number Effective From Effective To    [address] [phone] 6/30/2017     [place] [address]       Subscriber Name Subscriber Birth Date Member ID       CYNTHIA RIZO 1985 [id number]                 Emergency Contacts      (Rel.) Home Phone Work Phone Mobile Phone    Eric Oconnor (Brother) [phone] -- --    Dana Oconnor (Other) [phone] -- --                "

## 2018-08-17 ENCOUNTER — HOSPITAL ENCOUNTER (OUTPATIENT)
Dept: INFUSION THERAPY | Facility: HOSPITAL | Age: 33
Setting detail: INFUSION SERIES
Discharge: HOME | End: 2018-08-17
Attending: INTERNAL MEDICINE

## 2018-08-17 VITALS
TEMPERATURE: 98.3 F | RESPIRATION RATE: 18 BRPM | HEART RATE: 100 BPM | DIASTOLIC BLOOD PRESSURE: 65 MMHG | SYSTOLIC BLOOD PRESSURE: 105 MMHG

## 2018-08-17 DIAGNOSIS — S90.512S: ICD-10-CM

## 2018-08-17 PROCEDURE — 97606 NEG PRS WND THER DME>50 SQCM: CPT

## 2018-08-17 PROCEDURE — 97605 NEG PRS WND THER DME<=50SQCM: CPT

## 2018-08-17 NOTE — PATIENT INSTRUCTIONS
Negative Pressure Wound Therapy  What is negative pressure wound therapy?  Negative pressure wound therapy (NPWT) is a device that helps your wounds heal. NPWT helps your wound stay clean and healthy while it heals from the inside.  NPWT uses a bandage (dressing) that is made of a sponge or gauze-like material. This dressing is placed on or inside the wound. The wound is then covered and sealed with a cover dressing that sticks to your skin (adhesive). This keeps air out. A tube connects the cover dressing to a small pump. The pump sucks fluid and germs from the wound. The pump also controls any odor coming from the wound.  What are the benefits of NPWT?  The benefits of NPWT may include:  · Faster healing.  · Lower risk of infection.  · Decrease in swelling and how much fluid is in the wound.  · Fewer dressing changes.  · Ability to treat your wound at home.  · [place] hospital stay.  · Less pain.    What are the risks of NPWT?  NPWT is usually safe to use. The most common problem is skin irritation from the dressing adhesive, but there are many ways to help prevent this from happening. However, more serious problems can develop, such as:  · Bleeding.  · Infection.  · Dehydration.  · Pain.    What do I need to do to care for my wound?  · Do not take off the dressing yourself unless told to do so by your health care provider.  · Keep all follow-up visits as told by your health care provider. This is important.  · Make sure you know how to change your dressing, if you will be doing this at home.  · Keep the area clean and dry.  · Ask your health care provider for the best way to protect your skin from becoming irritated by the adhesive.  What do I need to know about the pump?  · Do not turn off the pump yourself unless told to do so by your health care provider, such as for bathing.  · Do not turn off the pump for more than two hours. If the pump is off for more than two hours, the dressing will need to be  changed.  · If your health care provider says it is okay to shower:  ? Do not take the pump into the shower.  ? Make sure the wound dressing is protected and sealed. The wound area must stay dry.  · Check frequently that the machine is on, that the machine indicates the therapy is on, and that all clamps are open.  · If the alarm sounds:  ? Stay calm.  ? Do not turn off the pump or do anything with the dressing.  ? Call your health care provider right away if you cannot fix the problem. The alarm may go off because the battery is low, the dressing has a leak, or the fluid collection container is full.  ? Explain to your health care provider what is happening. Follow his or her instructions.  When should I seek medical care?  Seek medical care if:  · You have new pain.  · You develop irritation, a rash, or itching around the wound or dressing.  · You see new black or yellow tissue in your wound.  · The dressing changes are painful or cause bleeding.  · The pump has been off for more than two hours and you do not know how to change the dressing.  · The pump alarm goes off and you do not know what to do.    When should I seek immediate medical care?  Seek immediate medical care if:  · You have a lot of bleeding.  · You see a sudden change in the color or texture of the drainage.  · The wound breaks open.  · You have severe pain.  · You have signs of infection, such as:  ? More redness, swelling, or pain.  ? More fluid or blood.  ? Warmth.  ? Pus or a bad smell.  ? Red streaks leading from wound.  ? A fever.    This information is not intended to replace advice given to you by your health care provider. Make sure you discuss any questions you have with your health care provider.  Document Released: 11/30/2009 Document Revised: 11/14/2017 Document Reviewed: 09/22/2016  Elsevier Interactive Patient Education © 2018 [employer]  ·

## 2018-08-20 ENCOUNTER — HOSPITAL ENCOUNTER (OUTPATIENT)
Dept: INFUSION THERAPY | Facility: HOSPITAL | Age: 33
Setting detail: INFUSION SERIES
Discharge: HOME | End: 2018-08-20
Attending: INTERNAL MEDICINE

## 2018-08-20 VITALS
TEMPERATURE: 98.4 F | SYSTOLIC BLOOD PRESSURE: 120 MMHG | HEART RATE: 96 BPM | DIASTOLIC BLOOD PRESSURE: 78 MMHG | RESPIRATION RATE: 18 BRPM

## 2018-08-20 DIAGNOSIS — S90.512S: Primary | ICD-10-CM

## 2018-08-20 PROCEDURE — 97606 NEG PRS WND THER DME>50 SQCM: CPT

## 2018-08-20 PROCEDURE — 97605 NEG PRS WND THER DME<=50SQCM: CPT

## 2018-08-20 PROCEDURE — 97602 WOUND(S) CARE NON-SELECTIVE: CPT

## 2018-08-22 ENCOUNTER — HOSPITAL ENCOUNTER (OUTPATIENT)
Dept: INFUSION THERAPY | Facility: HOSPITAL | Age: 33
Setting detail: INFUSION SERIES
Discharge: HOME | End: 2018-08-22
Attending: INTERNAL MEDICINE

## 2018-08-22 VITALS
TEMPERATURE: 98.4 F | DIASTOLIC BLOOD PRESSURE: 68 MMHG | SYSTOLIC BLOOD PRESSURE: 112 MMHG | HEART RATE: 91 BPM | RESPIRATION RATE: 17 BRPM

## 2018-08-22 DIAGNOSIS — L03.116 CELLULITIS OF LEFT ANKLE: ICD-10-CM

## 2018-08-22 PROCEDURE — 97605 NEG PRS WND THER DME<=50SQCM: CPT

## 2018-08-23 ENCOUNTER — OFFICE VISIT (OUTPATIENT)
Dept: FAMILY MEDICINE CLINIC | Facility: CLINIC | Age: 33
End: 2018-08-23

## 2018-08-23 VITALS
WEIGHT: 173 LBS | HEIGHT: 64 IN | BODY MASS INDEX: 29.53 KG/M2 | SYSTOLIC BLOOD PRESSURE: 129 MMHG | OXYGEN SATURATION: 99 % | HEART RATE: 102 BPM | DIASTOLIC BLOOD PRESSURE: 79 MMHG

## 2018-08-23 DIAGNOSIS — L98.499 TYPE 2 DIABETES MELLITUS WITH OTHER SKIN ULCER, UNSPECIFIED LONG TERM INSULIN USE STATUS: Primary | ICD-10-CM

## 2018-08-23 DIAGNOSIS — E11.622 TYPE 2 DIABETES MELLITUS WITH OTHER SKIN ULCER, UNSPECIFIED LONG TERM INSULIN USE STATUS: Primary | ICD-10-CM

## 2018-08-23 DIAGNOSIS — L02.419 ABSCESS OF ANKLE: ICD-10-CM

## 2018-08-23 DIAGNOSIS — L97.529 ULCER OF LEFT FOOT, UNSPECIFIED ULCER STAGE (HCC): ICD-10-CM

## 2018-08-23 DIAGNOSIS — L03.116 CELLULITIS OF LEFT ANKLE: ICD-10-CM

## 2018-08-23 DIAGNOSIS — D50.9 IRON DEFICIENCY ANEMIA, UNSPECIFIED IRON DEFICIENCY ANEMIA TYPE: ICD-10-CM

## 2018-08-23 LAB
ALBUMIN SERPL-MCNC: 4.4 G/DL (ref 3.5–5)
ALBUMIN UR-MCNC: 10.3 MG/L
ALBUMIN/GLOB SERPL: 1.2 G/DL (ref 1.5–2.5)
ALP SERPL-CCNC: 106 U/L (ref 35–104)
ALT SERPL W P-5'-P-CCNC: 24 U/L (ref 10–36)
ANION GAP SERPL CALCULATED.3IONS-SCNC: 6.9 MMOL/L (ref 3.6–11.2)
AST SERPL-CCNC: 27 U/L (ref 10–30)
BASOPHILS # BLD AUTO: 0.04 10*3/MM3 (ref 0–0.3)
BASOPHILS NFR BLD AUTO: 0.4 % (ref 0–2)
BILIRUB SERPL-MCNC: 0.8 MG/DL (ref 0.2–1.8)
BUN BLD-MCNC: 8 MG/DL (ref 7–21)
BUN/CREAT SERPL: 11.8 (ref 7–25)
CALCIUM SPEC-SCNC: 9.5 MG/DL (ref 7.7–10)
CHLORIDE SERPL-SCNC: 105 MMOL/L (ref 99–112)
CO2 SERPL-SCNC: 29.1 MMOL/L (ref 24.3–31.9)
CREAT BLD-MCNC: 0.68 MG/DL (ref 0.43–1.29)
DEPRECATED RDW RBC AUTO: 41.9 FL (ref 37–54)
EOSINOPHIL # BLD AUTO: 0.15 10*3/MM3 (ref 0–0.7)
EOSINOPHIL NFR BLD AUTO: 1.5 % (ref 0–5)
ERYTHROCYTE [DISTWIDTH] IN BLOOD BY AUTOMATED COUNT: 13 % (ref 11.5–14.5)
GFR SERPL CREATININE-BSD FRML MDRD: 98 ML/MIN/1.73
GLOBULIN UR ELPH-MCNC: 3.7 GM/DL
GLUCOSE BLD-MCNC: 130 MG/DL (ref 70–110)
HCT VFR BLD AUTO: 42 % (ref 37–47)
HGB BLD-MCNC: 14 G/DL (ref 12–16)
IMM GRANULOCYTES # BLD: 0.03 10*3/MM3 (ref 0–0.03)
IMM GRANULOCYTES NFR BLD: 0.3 % (ref 0–0.5)
LYMPHOCYTES # BLD AUTO: 2.68 10*3/MM3 (ref 1–3)
LYMPHOCYTES NFR BLD AUTO: 26.8 % (ref 21–51)
MCH RBC QN AUTO: 29.8 PG (ref 27–33)
MCHC RBC AUTO-ENTMCNC: 33.3 G/DL (ref 33–37)
MCV RBC AUTO: 89.4 FL (ref 80–94)
MONOCYTES # BLD AUTO: 0.48 10*3/MM3 (ref 0.1–0.9)
MONOCYTES NFR BLD AUTO: 4.8 % (ref 0–10)
NEUTROPHILS # BLD AUTO: 6.62 10*3/MM3 (ref 1.4–6.5)
NEUTROPHILS NFR BLD AUTO: 66.2 % (ref 30–70)
OSMOLALITY SERPL CALC.SUM OF ELEC: 281.3 MOSM/KG (ref 273–305)
PLATELET # BLD AUTO: 464 10*3/MM3 (ref 130–400)
PMV BLD AUTO: 9.9 FL (ref 6–10)
POTASSIUM BLD-SCNC: 4.4 MMOL/L (ref 3.5–5.3)
PROT SERPL-MCNC: 8.1 G/DL (ref 6–8)
RBC # BLD AUTO: 4.7 10*6/MM3 (ref 4.2–5.4)
SODIUM BLD-SCNC: 141 MMOL/L (ref 135–153)
TSH SERPL DL<=0.05 MIU/L-ACNC: 3.34 MIU/ML (ref 0.55–4.78)
WBC NRBC COR # BLD: 10 10*3/MM3 (ref 4.5–12.5)

## 2018-08-23 PROCEDURE — 36415 COLL VENOUS BLD VENIPUNCTURE: CPT | Performed by: NURSE PRACTITIONER

## 2018-08-23 PROCEDURE — 85025 COMPLETE CBC W/AUTO DIFF WBC: CPT | Performed by: NURSE PRACTITIONER

## 2018-08-23 PROCEDURE — 99203 OFFICE O/P NEW LOW 30 MIN: CPT | Performed by: NURSE PRACTITIONER

## 2018-08-23 PROCEDURE — 82043 UR ALBUMIN QUANTITATIVE: CPT | Performed by: NURSE PRACTITIONER

## 2018-08-23 PROCEDURE — 84443 ASSAY THYROID STIM HORMONE: CPT | Performed by: NURSE PRACTITIONER

## 2018-08-23 PROCEDURE — 80053 COMPREHEN METABOLIC PANEL: CPT | Performed by: NURSE PRACTITIONER

## 2018-08-23 RX ORDER — FERROUS SULFATE 325(65) MG
325 TABLET ORAL
Qty: 30 TABLET | Refills: 0 | Status: SHIPPED | OUTPATIENT
Start: 2018-08-23 | End: 2018-08-23 | Stop reason: SDUPTHER

## 2018-08-23 RX ORDER — PSEUDOEPHEDRINE HCL 30 MG
100 TABLET ORAL 2 TIMES DAILY
Qty: 60 EACH | Refills: 2 | Status: SHIPPED | OUTPATIENT
Start: 2018-08-23 | End: 2018-10-11

## 2018-08-23 RX ORDER — FAMOTIDINE 20 MG/1
20 TABLET, FILM COATED ORAL DAILY
Qty: 30 TABLET | Refills: 2 | Status: SHIPPED | OUTPATIENT
Start: 2018-08-23 | End: 2018-09-25

## 2018-08-23 RX ORDER — FERROUS SULFATE 325(65) MG
325 TABLET ORAL
Qty: 30 TABLET | Refills: 2 | Status: SHIPPED | OUTPATIENT
Start: 2018-08-23 | End: 2018-10-03

## 2018-08-23 NOTE — PROGRESS NOTES
Subjective   Cynthia Ríos is a 33 y.o. female.     Chief Complaint: Transitional Care Management    History of Present Illness   Pt here to establish care.  She also has been in the hospital.  Pt has not been following with a PCP in several years.   Pt discovered she had DM II while she was hospitalized and has been taking medication since discharge.    Pt was admitted to [facility] on 8/8/2014 and discharged home on 8/15/2018.  Notes per [facility] discharge summary (reviewed) as follows:  She presented to [facility] complaining of pain, swelling, and redness of the right ankle; she was found to have an open wound in the area in question and she was septic.  She was admitted to the medical surgical floor for further evaluation and treatment.  Please see the admitting history and physical for further details.  Blood and wound cultures were obtained in the ED and she was empirically started on vancomycin and zosyn; infectious disease was consulted.  They changed the antibiotics to high dose rocephin and vancomycin.  Orthopedic surgery was consulted and stated that the patient need incision and draniage and in the OR necrotic tissue was seen so excisional debridement also occurred.  Wound culture grew MSSA and blood cultures did not grow anything.  Ultrasound did not show a DVT.  Dr. Artis did not see any bone or joint infection during surgery and thus it was suggested finishing treatment with oral keflex through 8/22/2018.  The wound will be managed with a wound vac system, which will be changed in the infusion clinic on Monday, Wednesday, and Friday.      Pt is here today for transitional care management visit.  She states that she is doing well. She is continuing the antibiotics as prescribed.  She is following with infusion clinic as advised for care of her wound vac.  She denies any issues with the wound at this time.  She denies very much pain in the ankle area.  Patient is currently taking 30u of Levemir at  bedtime.  She is also taking 10u of humalog at mealtime.  She was also told to add to the humalog on a sliding scale; however, she did not receive a sliding scale schedule upon discharge. I will give her a sliding scale schedule to use at home at this time.  I have discussed the importance of BS control with her today and patient has stated understanding.  She states that there is a strong family hx of DM including her mother and father.  She was unaware that she DM when she was admitted to the hospital. Her A1C during hospitalization was 10.6.   I have discussed with her today that she need to have close follow up on her ankle with Dr Artis as scheduled and she has stated understanding.  We have also discussed her DM and close follow up to get her numbers under control and she has stated understanding.  She is checking her BS 3-4 times day and dong well with it at this point.  She has been watching her sugar/sweets/carb intake since her hospitalization.      She presents to the office today with an other area on the ball of her left foot that appears to be a pressure ulcer.  She states that she has been trying to ambulate with putting the weight on the ball of her foot instead of her heel area.  She does have some skin breakdown on the ball of her foot.  I have discussed with her the risks of untreated ulcers on her foot due to her DM.  She is agreeable to seeing podiatry also at this time.  She denies any numbness or tingling in her feet.  I will go ahead and refer her to podiatry for evaluation of the ulcer.  We will also place a moist to dry dressing to the area today.      Family History   Problem Relation Age of Onset   • Diabetes Mother    • Heart disease Mother    • COPD Mother    • Atrial fibrillation Mother    • Heart failure Mother    • Liver disease Mother    • Diabetes Father        Social History     Social History   • Marital status: Single     Spouse name: N/A   • Number of children: N/A   • Years of  "education: N/A     Occupational History   • Not on file.     Social History Main Topics   • Smoking status: Never Smoker   • Smokeless tobacco: Never Used   • Alcohol use Yes      Comment: a couple a month    • Drug use: No   • Sexual activity: Defer     Other Topics Concern   • Not on file     Social History Narrative   • No narrative on file       Past Medical History:   Diagnosis Date   • Anemia    • Diabetes mellitus, type 2 (CMS/HCC)    • History of low potassium    • History of seizures    • PCOS (polycystic ovarian syndrome)        Review of Systems   Constitutional: Negative.    HENT: Negative.    Respiratory: Negative.    Cardiovascular: Negative.    Gastrointestinal: Negative.    Musculoskeletal: Positive for joint swelling (left foot/ankle swelling).   Skin: Negative.         Ulcer to ball of left foot   Neurological: Negative.    Psychiatric/Behavioral: Negative.        Objective   Physical Exam   Constitutional: She is oriented to person, place, and time. She appears well-developed and well-nourished.   Neck: Normal range of motion. Neck supple.   Cardiovascular: Normal rate, regular rhythm and normal heart sounds.    Pulmonary/Chest: Effort normal and breath sounds normal.   Neurological: She is alert and oriented to person, place, and time.   Skin: Skin is warm and dry.   Wound vac patent to left lateral ankle area; swelling to the ankle and foot; very minimal erythema to the ankle and foot; quarter size pressure ulcer noted to ball of left foot; no bleeding or drainage noted at this time   Psychiatric: She has a normal mood and affect. Her behavior is normal. Judgment and thought content normal.   Nursing note and vitals reviewed.      Procedures    Vitals: Blood pressure 129/79, pulse 102, height 162.6 cm (64\"), weight 78.5 kg (173 lb), SpO2 99 %, not currently breastfeeding.    Allergies: No Known Allergies     During this visit the following were done:  Labs Reviewed []    Labs Ordered []  "   Radiology Reports Reviewed []    Radiology Ordered []    PCP Records Reviewed []    Referring Provider Records Reviewed []    ER Records Reviewed []    Hospital Records Reviewed []    History Obtained From Family []    Radiology Images Reviewed []    Other Reviewed []    Records Requested []      Assessment/Plan   Cynthia was seen today for transitional care management.    Diagnoses and all orders for this visit:    Type 2 diabetes mellitus with other skin ulcer, unspecified long term insulin use status (CMS/[facility])  -     Ambulatory Referral to Podiatry  -     insulin detemir (LEVEMIR) 100 UNIT/ML injection; Inject 30 Units under the skin into the appropriate area as directed Every Night.  -     Insulin Lispro (HUMALOG) 100 UNIT/ML solution pen-injector; Inject 10 units subQ three times daily before meals & per sliding scale before meals & at bedtiime.  -     CBC & Differential  -     Comprehensive Metabolic Panel  -     TSH  -     MicroAlbumin, Urine, Random - Urine, Clean Catch  -     ferrous sulfate 325 (65 FE) MG tablet; Take 1 tablet by mouth Daily With Breakfast.  -     CBC Auto Differential  -     Osmolality, Calculated; Future  -     Osmolality, Calculated    Abscess of ankle  -     CBC & Differential  -     Comprehensive Metabolic Panel  -     TSH  -     MicroAlbumin, Urine, Random - Urine, Clean Catch  -     ferrous sulfate 325 (65 FE) MG tablet; Take 1 tablet by mouth Daily With Breakfast.  -     CBC Auto Differential  -     Osmolality, Calculated; Future  -     Osmolality, Calculated    Cellulitis of left ankle  -     CBC & Differential  -     Comprehensive Metabolic Panel  -     TSH  -     MicroAlbumin, Urine, Random - Urine, Clean Catch  -     ferrous sulfate 325 (65 FE) MG tablet; Take 1 tablet by mouth Daily With Breakfast.  -     CBC Auto Differential  -     Osmolality, Calculated; Future  -     Osmolality, Calculated    Ulcer of left foot, unspecified ulcer stage (CMS/[facility])  -     CBC &  Differential  -     Comprehensive Metabolic Panel  -     TSH  -     MicroAlbumin, Urine, Random - Urine, Clean Catch  -     ferrous sulfate 325 (65 FE) MG tablet; Take 1 tablet by mouth Daily With Breakfast.  -     CBC Auto Differential  -     Osmolality, Calculated; Future  -     Osmolality, Calculated    Iron deficiency anemia, unspecified iron deficiency anemia type  -     CBC & Differential  -     Comprehensive Metabolic Panel  -     TSH  -     MicroAlbumin, Urine, Random - Urine, Clean Catch  -     ferrous sulfate 325 (65 FE) MG tablet; Take 1 tablet by mouth Daily With Breakfast.  -     CBC Auto Differential  -     Osmolality, Calculated; Future  -     Osmolality, Calculated    Other orders  -     docusate sodium 100 MG capsule; Take 1 capsule by mouth 2 (Two) Times a Day.  -     famotidine (PEPCID) 20 MG tablet; Take 1 tablet by mouth Daily.  -     Discontinue: ferrous sulfate 325 (65 FE) MG tablet; Take 1 tablet by mouth Daily With Breakfast.

## 2018-08-24 ENCOUNTER — TELEPHONE (OUTPATIENT)
Dept: FAMILY MEDICINE CLINIC | Facility: CLINIC | Age: 33
End: 2018-08-24

## 2018-08-24 ENCOUNTER — HOSPITAL ENCOUNTER (OUTPATIENT)
Dept: INFUSION THERAPY | Facility: HOSPITAL | Age: 33
Setting detail: INFUSION SERIES
Discharge: HOME | End: 2018-08-24
Attending: INTERNAL MEDICINE

## 2018-08-24 VITALS
DIASTOLIC BLOOD PRESSURE: 68 MMHG | SYSTOLIC BLOOD PRESSURE: 97 MMHG | HEART RATE: 103 BPM | TEMPERATURE: 98.3 F | RESPIRATION RATE: 20 BRPM

## 2018-08-24 DIAGNOSIS — L03.116 CELLULITIS OF LEFT ANKLE: ICD-10-CM

## 2018-08-24 PROCEDURE — 97605 NEG PRS WND THER DME<=50SQCM: CPT

## 2018-08-24 NOTE — TELEPHONE ENCOUNTER
----- Message from RADHA Sutherland sent at 8/23/2018  5:41 PM EDT -----  Labs appear to be improved.  Please let pt know.        Patient notified & verbalized understanding.

## 2018-08-27 ENCOUNTER — HOSPITAL ENCOUNTER (OUTPATIENT)
Dept: INFUSION THERAPY | Facility: HOSPITAL | Age: 33
Setting detail: INFUSION SERIES
Discharge: HOME | End: 2018-08-27
Attending: INTERNAL MEDICINE

## 2018-08-27 VITALS
SYSTOLIC BLOOD PRESSURE: 111 MMHG | RESPIRATION RATE: 20 BRPM | TEMPERATURE: 98.3 F | DIASTOLIC BLOOD PRESSURE: 66 MMHG | HEART RATE: 108 BPM

## 2018-08-27 DIAGNOSIS — L03.116 CELLULITIS OF LEFT ANKLE: ICD-10-CM

## 2018-08-27 PROCEDURE — 97605 NEG PRS WND THER DME<=50SQCM: CPT

## 2018-08-27 PROCEDURE — 97606 NEG PRS WND THER DME>50 SQCM: CPT

## 2018-08-29 ENCOUNTER — HOSPITAL ENCOUNTER (OUTPATIENT)
Dept: INFUSION THERAPY | Facility: HOSPITAL | Age: 33
Setting detail: INFUSION SERIES
Discharge: HOME | End: 2018-08-29
Attending: INTERNAL MEDICINE

## 2018-08-29 VITALS
RESPIRATION RATE: 20 BRPM | HEART RATE: 96 BPM | SYSTOLIC BLOOD PRESSURE: 111 MMHG | DIASTOLIC BLOOD PRESSURE: 71 MMHG | TEMPERATURE: 98.1 F

## 2018-08-29 DIAGNOSIS — L03.116 CELLULITIS OF LEFT ANKLE: ICD-10-CM

## 2018-08-29 PROCEDURE — 97605 NEG PRS WND THER DME<=50SQCM: CPT

## 2018-08-31 ENCOUNTER — APPOINTMENT (OUTPATIENT)
Dept: GENERAL RADIOLOGY | Facility: HOSPITAL | Age: 33
End: 2018-08-31

## 2018-08-31 ENCOUNTER — APPOINTMENT (OUTPATIENT)
Dept: INFUSION THERAPY | Facility: HOSPITAL | Age: 33
End: 2018-08-31
Attending: INTERNAL MEDICINE

## 2018-08-31 ENCOUNTER — HOSPITAL ENCOUNTER (EMERGENCY)
Facility: HOSPITAL | Age: 33
Discharge: HOME | End: 2018-08-31
Attending: EMERGENCY MEDICINE | Admitting: EMERGENCY MEDICINE

## 2018-08-31 ENCOUNTER — HOSPITAL ENCOUNTER (OUTPATIENT)
Dept: INFUSION THERAPY | Facility: HOSPITAL | Age: 33
Setting detail: INFUSION SERIES
Discharge: HOME | End: 2018-08-31
Attending: INTERNAL MEDICINE

## 2018-08-31 VITALS
BODY MASS INDEX: 29.88 KG/M2 | DIASTOLIC BLOOD PRESSURE: 73 MMHG | SYSTOLIC BLOOD PRESSURE: 132 MMHG | OXYGEN SATURATION: 99 % | TEMPERATURE: 99.8 F | HEIGHT: 64 IN | WEIGHT: 175 LBS | RESPIRATION RATE: 18 BRPM | HEART RATE: 115 BPM

## 2018-08-31 VITALS
RESPIRATION RATE: 18 BRPM | DIASTOLIC BLOOD PRESSURE: 68 MMHG | HEART RATE: 108 BPM | SYSTOLIC BLOOD PRESSURE: 102 MMHG | TEMPERATURE: 98.8 F

## 2018-08-31 DIAGNOSIS — L03.116 CELLULITIS OF LEFT ANKLE: ICD-10-CM

## 2018-08-31 DIAGNOSIS — L97.529 ULCER OF LEFT FOOT, UNSPECIFIED ULCER STAGE (HCC): Primary | ICD-10-CM

## 2018-08-31 LAB
ALBUMIN SERPL-MCNC: 4.4 G/DL (ref 3.5–5)
ALBUMIN/GLOB SERPL: 1.2 G/DL (ref 1.5–2.5)
ALP SERPL-CCNC: 111 U/L (ref 35–104)
ALT SERPL W P-5'-P-CCNC: 25 U/L (ref 10–36)
ANION GAP SERPL CALCULATED.3IONS-SCNC: 5 MMOL/L (ref 3.6–11.2)
AST SERPL-CCNC: 23 U/L (ref 10–30)
BASOPHILS # BLD AUTO: 0.03 10*3/MM3 (ref 0–0.3)
BASOPHILS NFR BLD AUTO: 0.3 % (ref 0–2)
BILIRUB SERPL-MCNC: 0.6 MG/DL (ref 0.2–1.8)
BUN BLD-MCNC: 9 MG/DL (ref 7–21)
BUN/CREAT SERPL: 13.2 (ref 7–25)
CALCIUM SPEC-SCNC: 9.7 MG/DL (ref 7.7–10)
CHLORIDE SERPL-SCNC: 105 MMOL/L (ref 99–112)
CO2 SERPL-SCNC: 27 MMOL/L (ref 24.3–31.9)
CREAT BLD-MCNC: 0.68 MG/DL (ref 0.43–1.29)
CRP SERPL-MCNC: 6.75 MG/DL (ref 0–0.99)
D-LACTATE SERPL-SCNC: 1.3 MMOL/L (ref 0.5–2)
DEPRECATED RDW RBC AUTO: 41.3 FL (ref 37–54)
EOSINOPHIL # BLD AUTO: 0.18 10*3/MM3 (ref 0–0.7)
EOSINOPHIL NFR BLD AUTO: 1.9 % (ref 0–5)
ERYTHROCYTE [DISTWIDTH] IN BLOOD BY AUTOMATED COUNT: 13 % (ref 11.5–14.5)
GFR SERPL CREATININE-BSD FRML MDRD: 98 ML/MIN/1.73
GLOBULIN UR ELPH-MCNC: 3.7 GM/DL
GLUCOSE BLD-MCNC: 249 MG/DL (ref 70–110)
HCT VFR BLD AUTO: 38.9 % (ref 37–47)
HGB BLD-MCNC: 13 G/DL (ref 12–16)
IMM GRANULOCYTES # BLD: 0.03 10*3/MM3 (ref 0–0.03)
IMM GRANULOCYTES NFR BLD: 0.3 % (ref 0–0.5)
LYMPHOCYTES # BLD AUTO: 1.66 10*3/MM3 (ref 1–3)
LYMPHOCYTES NFR BLD AUTO: 17.3 % (ref 21–51)
MCH RBC QN AUTO: 29 PG (ref 27–33)
MCHC RBC AUTO-ENTMCNC: 33.4 G/DL (ref 33–37)
MCV RBC AUTO: 86.6 FL (ref 80–94)
MONOCYTES # BLD AUTO: 0.93 10*3/MM3 (ref 0.1–0.9)
MONOCYTES NFR BLD AUTO: 9.7 % (ref 0–10)
NEUTROPHILS # BLD AUTO: 6.77 10*3/MM3 (ref 1.4–6.5)
NEUTROPHILS NFR BLD AUTO: 70.5 % (ref 30–70)
OSMOLALITY SERPL CALC.SUM OF ELEC: 280.9 MOSM/KG (ref 273–305)
PLATELET # BLD AUTO: 300 10*3/MM3 (ref 130–400)
PMV BLD AUTO: 9.6 FL (ref 6–10)
POTASSIUM BLD-SCNC: 4.1 MMOL/L (ref 3.5–5.3)
PROT SERPL-MCNC: 8.1 G/DL (ref 6–8)
RBC # BLD AUTO: 4.49 10*6/MM3 (ref 4.2–5.4)
SODIUM BLD-SCNC: 137 MMOL/L (ref 135–153)
WBC NRBC COR # BLD: 9.6 10*3/MM3 (ref 4.5–12.5)

## 2018-08-31 PROCEDURE — 96365 THER/PROPH/DIAG IV INF INIT: CPT

## 2018-08-31 PROCEDURE — 87040 BLOOD CULTURE FOR BACTERIA: CPT | Performed by: PHYSICIAN ASSISTANT

## 2018-08-31 PROCEDURE — 83605 ASSAY OF LACTIC ACID: CPT | Performed by: PHYSICIAN ASSISTANT

## 2018-08-31 PROCEDURE — 99283 EMERGENCY DEPT VISIT LOW MDM: CPT

## 2018-08-31 PROCEDURE — 73630 X-RAY EXAM OF FOOT: CPT

## 2018-08-31 PROCEDURE — 86140 C-REACTIVE PROTEIN: CPT | Performed by: PHYSICIAN ASSISTANT

## 2018-08-31 PROCEDURE — 73630 X-RAY EXAM OF FOOT: CPT | Performed by: RADIOLOGY

## 2018-08-31 PROCEDURE — 80053 COMPREHEN METABOLIC PANEL: CPT | Performed by: PHYSICIAN ASSISTANT

## 2018-08-31 PROCEDURE — 25010000002 CEFTRIAXONE: Performed by: PHYSICIAN ASSISTANT

## 2018-08-31 PROCEDURE — 85025 COMPLETE CBC W/AUTO DIFF WBC: CPT | Performed by: PHYSICIAN ASSISTANT

## 2018-08-31 PROCEDURE — 97605 NEG PRS WND THER DME<=50SQCM: CPT

## 2018-08-31 RX ORDER — SODIUM CHLORIDE 0.9 % (FLUSH) 0.9 %
10 SYRINGE (ML) INJECTION AS NEEDED
Status: DISCONTINUED | OUTPATIENT
Start: 2018-08-31 | End: 2018-08-31 | Stop reason: HOSPADM

## 2018-08-31 RX ORDER — LEVOFLOXACIN 500 MG/1
500 TABLET, FILM COATED ORAL DAILY
Qty: 7 TABLET | Refills: 0 | Status: ON HOLD | OUTPATIENT
Start: 2018-08-31 | End: 2018-09-03

## 2018-08-31 RX ADMIN — CEFTRIAXONE 1 G: 1 INJECTION, POWDER, FOR SOLUTION INTRAMUSCULAR; INTRAVENOUS at 18:13

## 2018-09-03 ENCOUNTER — HOSPITAL ENCOUNTER (INPATIENT)
Facility: HOSPITAL | Age: 33
LOS: 4 days | Discharge: HOME | End: 2018-09-07
Attending: EMERGENCY MEDICINE | Admitting: INTERNAL MEDICINE

## 2018-09-03 ENCOUNTER — ANESTHESIA (OUTPATIENT)
Dept: PERIOP | Facility: HOSPITAL | Age: 33
End: 2018-09-03

## 2018-09-03 ENCOUNTER — ANESTHESIA EVENT (OUTPATIENT)
Dept: PERIOP | Facility: HOSPITAL | Age: 33
End: 2018-09-03

## 2018-09-03 ENCOUNTER — HOSPITAL ENCOUNTER (OUTPATIENT)
Dept: INFUSION THERAPY | Facility: HOSPITAL | Age: 33
Setting detail: INFUSION SERIES
Discharge: HOME | End: 2018-09-03
Attending: INTERNAL MEDICINE

## 2018-09-03 ENCOUNTER — APPOINTMENT (OUTPATIENT)
Dept: GENERAL RADIOLOGY | Facility: HOSPITAL | Age: 33
End: 2018-09-03

## 2018-09-03 VITALS
HEART RATE: 96 BPM | SYSTOLIC BLOOD PRESSURE: 97 MMHG | TEMPERATURE: 98.4 F | RESPIRATION RATE: 20 BRPM | DIASTOLIC BLOOD PRESSURE: 65 MMHG

## 2018-09-03 DIAGNOSIS — E11.69 DIABETIC FOOT ULCER WITH OSTEOMYELITIS (HCC): ICD-10-CM

## 2018-09-03 DIAGNOSIS — L03.116 CELLULITIS OF LEFT ANKLE: ICD-10-CM

## 2018-09-03 DIAGNOSIS — L03.032 CELLULITIS OF TOE OF LEFT FOOT: ICD-10-CM

## 2018-09-03 DIAGNOSIS — L02.612 CELLULITIS AND ABSCESS OF TOE OF LEFT FOOT: ICD-10-CM

## 2018-09-03 DIAGNOSIS — L03.116 CELLULITIS OF LEFT ANKLE: Primary | ICD-10-CM

## 2018-09-03 DIAGNOSIS — E11.621 DIABETIC FOOT ULCER WITH OSTEOMYELITIS (HCC): ICD-10-CM

## 2018-09-03 DIAGNOSIS — A41.02 SEPSIS DUE TO METHICILLIN RESISTANT STAPHYLOCOCCUS AUREUS (MRSA) (HCC): ICD-10-CM

## 2018-09-03 DIAGNOSIS — L98.499 TYPE 2 DIABETES MELLITUS WITH OTHER SKIN ULCER, UNSPECIFIED LONG TERM INSULIN USE STATUS: ICD-10-CM

## 2018-09-03 DIAGNOSIS — M86.9 DIABETIC FOOT ULCER WITH OSTEOMYELITIS (HCC): ICD-10-CM

## 2018-09-03 DIAGNOSIS — E11.622 TYPE 2 DIABETES MELLITUS WITH OTHER SKIN ULCER, UNSPECIFIED LONG TERM INSULIN USE STATUS: ICD-10-CM

## 2018-09-03 DIAGNOSIS — M86.9 OSTEOMYELITIS OF LEFT FIBULA, UNSPECIFIED TYPE (HCC): ICD-10-CM

## 2018-09-03 DIAGNOSIS — L97.509 DIABETIC FOOT ULCER WITH OSTEOMYELITIS (HCC): ICD-10-CM

## 2018-09-03 DIAGNOSIS — L03.032 CELLULITIS AND ABSCESS OF TOE OF LEFT FOOT: ICD-10-CM

## 2018-09-03 LAB
ALBUMIN SERPL-MCNC: 3.9 G/DL (ref 3.5–5)
ALBUMIN/GLOB SERPL: 1.1 G/DL (ref 1.5–2.5)
ALP SERPL-CCNC: 99 U/L (ref 35–104)
ALT SERPL W P-5'-P-CCNC: 27 U/L (ref 10–36)
ANION GAP SERPL CALCULATED.3IONS-SCNC: 6.8 MMOL/L (ref 3.6–11.2)
AST SERPL-CCNC: 24 U/L (ref 10–30)
B-HCG UR QL: NEGATIVE
BACTERIA UR QL AUTO: ABNORMAL /HPF
BASOPHILS # BLD AUTO: 0.04 10*3/MM3 (ref 0–0.3)
BASOPHILS NFR BLD AUTO: 0.3 % (ref 0–2)
BILIRUB SERPL-MCNC: 0.4 MG/DL (ref 0.2–1.8)
BILIRUB UR QL STRIP: ABNORMAL
BUN BLD-MCNC: 12 MG/DL (ref 7–21)
BUN/CREAT SERPL: 17.9 (ref 7–25)
CALCIUM SPEC-SCNC: 8.8 MG/DL (ref 7.7–10)
CHLORIDE SERPL-SCNC: 104 MMOL/L (ref 99–112)
CLARITY UR: ABNORMAL
CO2 SERPL-SCNC: 27.2 MMOL/L (ref 24.3–31.9)
COD CRY URNS QL: ABNORMAL /HPF
COLOR UR: ABNORMAL
CREAT BLD-MCNC: 0.67 MG/DL (ref 0.43–1.29)
CRP SERPL-MCNC: 7.18 MG/DL (ref 0–0.99)
D-LACTATE SERPL-SCNC: 1.1 MMOL/L (ref 0.5–2)
DEPRECATED RDW RBC AUTO: 39.2 FL (ref 37–54)
EOSINOPHIL # BLD AUTO: 0.32 10*3/MM3 (ref 0–0.7)
EOSINOPHIL NFR BLD AUTO: 2.7 % (ref 0–5)
ERYTHROCYTE [DISTWIDTH] IN BLOOD BY AUTOMATED COUNT: 12.7 % (ref 11.5–14.5)
ERYTHROCYTE [SEDIMENTATION RATE] IN BLOOD: 45 MM/HR (ref 0–20)
GFR SERPL CREATININE-BSD FRML MDRD: 100 ML/MIN/1.73
GLOBULIN UR ELPH-MCNC: 3.5 GM/DL
GLUCOSE BLD-MCNC: 239 MG/DL (ref 70–110)
GLUCOSE BLDC GLUCOMTR-MCNC: 85 MG/DL (ref 70–130)
GLUCOSE UR STRIP-MCNC: ABNORMAL MG/DL
HCT VFR BLD AUTO: 33.2 % (ref 37–47)
HGB BLD-MCNC: 11.2 G/DL (ref 12–16)
HGB UR QL STRIP.AUTO: NEGATIVE
HYALINE CASTS UR QL AUTO: ABNORMAL /LPF
IMM GRANULOCYTES # BLD: 0.02 10*3/MM3 (ref 0–0.03)
IMM GRANULOCYTES NFR BLD: 0.2 % (ref 0–0.5)
KETONES UR QL STRIP: ABNORMAL
LEUKOCYTE ESTERASE UR QL STRIP.AUTO: ABNORMAL
LYMPHOCYTES # BLD AUTO: 2.71 10*3/MM3 (ref 1–3)
LYMPHOCYTES NFR BLD AUTO: 22.7 % (ref 21–51)
MCH RBC QN AUTO: 29.4 PG (ref 27–33)
MCHC RBC AUTO-ENTMCNC: 33.7 G/DL (ref 33–37)
MCV RBC AUTO: 87.1 FL (ref 80–94)
MONOCYTES # BLD AUTO: 0.73 10*3/MM3 (ref 0.1–0.9)
MONOCYTES NFR BLD AUTO: 6.1 % (ref 0–10)
MUCOUS THREADS URNS QL MICRO: ABNORMAL /HPF
NEUTROPHILS # BLD AUTO: 8.11 10*3/MM3 (ref 1.4–6.5)
NEUTROPHILS NFR BLD AUTO: 68 % (ref 30–70)
NITRITE UR QL STRIP: NEGATIVE
OSMOLALITY SERPL CALC.SUM OF ELEC: 283.2 MOSM/KG (ref 273–305)
PH UR STRIP.AUTO: <=5 [PH] (ref 5–8)
PLATELET # BLD AUTO: 349 10*3/MM3 (ref 130–400)
PMV BLD AUTO: 9.1 FL (ref 6–10)
POTASSIUM BLD-SCNC: 4 MMOL/L (ref 3.5–5.3)
PROT SERPL-MCNC: 7.4 G/DL (ref 6–8)
PROT UR QL STRIP: ABNORMAL
RBC # BLD AUTO: 3.81 10*6/MM3 (ref 4.2–5.4)
RBC # UR: ABNORMAL /HPF
REF LAB TEST METHOD: ABNORMAL
SODIUM BLD-SCNC: 138 MMOL/L (ref 135–153)
SP GR UR STRIP: >1.03 (ref 1–1.03)
SQUAMOUS #/AREA URNS HPF: ABNORMAL /HPF
UROBILINOGEN UR QL STRIP: ABNORMAL
WBC NRBC COR # BLD: 11.93 10*3/MM3 (ref 4.5–12.5)
WBC UR QL AUTO: ABNORMAL /HPF

## 2018-09-03 PROCEDURE — 83605 ASSAY OF LACTIC ACID: CPT | Performed by: PHYSICIAN ASSISTANT

## 2018-09-03 PROCEDURE — 25010000002 VANCOMYCIN PER 500 MG: Performed by: PHYSICIAN ASSISTANT

## 2018-09-03 PROCEDURE — 85652 RBC SED RATE AUTOMATED: CPT | Performed by: PHYSICIAN ASSISTANT

## 2018-09-03 PROCEDURE — 82962 GLUCOSE BLOOD TEST: CPT

## 2018-09-03 PROCEDURE — 25010000002 PROPOFOL 10 MG/ML EMULSION: Performed by: NURSE ANESTHETIST, CERTIFIED REGISTERED

## 2018-09-03 PROCEDURE — 81025 URINE PREGNANCY TEST: CPT | Performed by: PHYSICIAN ASSISTANT

## 2018-09-03 PROCEDURE — 87040 BLOOD CULTURE FOR BACTERIA: CPT | Performed by: PHYSICIAN ASSISTANT

## 2018-09-03 PROCEDURE — 99223 1ST HOSP IP/OBS HIGH 75: CPT | Performed by: INTERNAL MEDICINE

## 2018-09-03 PROCEDURE — 87077 CULTURE AEROBIC IDENTIFY: CPT | Performed by: PHYSICIAN ASSISTANT

## 2018-09-03 PROCEDURE — 25010000002 PIPERACILLIN-TAZOBACTAM

## 2018-09-03 PROCEDURE — 99284 EMERGENCY DEPT VISIT MOD MDM: CPT

## 2018-09-03 PROCEDURE — 25010000002 MIDAZOLAM PER 1 MG: Performed by: NURSE ANESTHETIST, CERTIFIED REGISTERED

## 2018-09-03 PROCEDURE — G0463 HOSPITAL OUTPT CLINIC VISIT: HCPCS

## 2018-09-03 PROCEDURE — 99285 EMERGENCY DEPT VISIT HI MDM: CPT

## 2018-09-03 PROCEDURE — 25010000002 ONDANSETRON PER 1 MG: Performed by: NURSE ANESTHETIST, CERTIFIED REGISTERED

## 2018-09-03 PROCEDURE — 87070 CULTURE OTHR SPECIMN AEROBIC: CPT | Performed by: PHYSICIAN ASSISTANT

## 2018-09-03 PROCEDURE — 87147 CULTURE TYPE IMMUNOLOGIC: CPT | Performed by: PHYSICIAN ASSISTANT

## 2018-09-03 PROCEDURE — 85025 COMPLETE CBC W/AUTO DIFF WBC: CPT | Performed by: PHYSICIAN ASSISTANT

## 2018-09-03 PROCEDURE — 87205 SMEAR GRAM STAIN: CPT | Performed by: PHYSICIAN ASSISTANT

## 2018-09-03 PROCEDURE — 73630 X-RAY EXAM OF FOOT: CPT

## 2018-09-03 PROCEDURE — 86140 C-REACTIVE PROTEIN: CPT | Performed by: PHYSICIAN ASSISTANT

## 2018-09-03 PROCEDURE — 25010000002 FENTANYL CITRATE (PF) 100 MCG/2ML SOLUTION: Performed by: NURSE ANESTHETIST, CERTIFIED REGISTERED

## 2018-09-03 PROCEDURE — 0JBR0ZZ EXCISION OF LEFT FOOT SUBCUTANEOUS TISSUE AND FASCIA, OPEN APPROACH: ICD-10-PCS | Performed by: PODIATRIST

## 2018-09-03 PROCEDURE — 25010000002 PIPERACILLIN-TAZOBACTAM: Performed by: PHYSICIAN ASSISTANT

## 2018-09-03 PROCEDURE — 87186 SC STD MICRODIL/AGAR DIL: CPT | Performed by: PHYSICIAN ASSISTANT

## 2018-09-03 PROCEDURE — 80053 COMPREHEN METABOLIC PANEL: CPT | Performed by: PHYSICIAN ASSISTANT

## 2018-09-03 PROCEDURE — 81001 URINALYSIS AUTO W/SCOPE: CPT | Performed by: PHYSICIAN ASSISTANT

## 2018-09-03 PROCEDURE — 73630 X-RAY EXAM OF FOOT: CPT | Performed by: RADIOLOGY

## 2018-09-03 RX ORDER — MULTIVITAMIN
1 TABLET ORAL DAILY
Status: DISCONTINUED | OUTPATIENT
Start: 2018-09-04 | End: 2018-09-07 | Stop reason: HOSPADM

## 2018-09-03 RX ORDER — MAGNESIUM HYDROXIDE 1200 MG/15ML
LIQUID ORAL AS NEEDED
Status: DISCONTINUED | OUTPATIENT
Start: 2018-09-03 | End: 2018-09-03 | Stop reason: HOSPADM

## 2018-09-03 RX ORDER — LIDOCAINE HYDROCHLORIDE 20 MG/ML
INJECTION, SOLUTION INFILTRATION; PERINEURAL AS NEEDED
Status: DISCONTINUED | OUTPATIENT
Start: 2018-09-03 | End: 2018-09-03 | Stop reason: SURG

## 2018-09-03 RX ORDER — FENTANYL CITRATE 50 UG/ML
INJECTION, SOLUTION INTRAMUSCULAR; INTRAVENOUS AS NEEDED
Status: DISCONTINUED | OUTPATIENT
Start: 2018-09-03 | End: 2018-09-03 | Stop reason: SURG

## 2018-09-03 RX ORDER — IPRATROPIUM BROMIDE AND ALBUTEROL SULFATE 2.5; .5 MG/3ML; MG/3ML
3 SOLUTION RESPIRATORY (INHALATION) ONCE AS NEEDED
Status: DISCONTINUED | OUTPATIENT
Start: 2018-09-03 | End: 2018-09-03 | Stop reason: HOSPADM

## 2018-09-03 RX ORDER — DOCUSATE SODIUM 100 MG/1
100 CAPSULE, LIQUID FILLED ORAL 2 TIMES DAILY
Status: DISCONTINUED | OUTPATIENT
Start: 2018-09-03 | End: 2018-09-07 | Stop reason: HOSPADM

## 2018-09-03 RX ORDER — FENTANYL CITRATE 50 UG/ML
50 INJECTION, SOLUTION INTRAMUSCULAR; INTRAVENOUS
Status: DISCONTINUED | OUTPATIENT
Start: 2018-09-03 | End: 2018-09-03 | Stop reason: HOSPADM

## 2018-09-03 RX ORDER — NICOTINE POLACRILEX 4 MG
15 LOZENGE BUCCAL
Status: DISCONTINUED | OUTPATIENT
Start: 2018-09-03 | End: 2018-09-07 | Stop reason: HOSPADM

## 2018-09-03 RX ORDER — DEXTROSE MONOHYDRATE 25 G/50ML
25 INJECTION, SOLUTION INTRAVENOUS
Status: DISCONTINUED | OUTPATIENT
Start: 2018-09-03 | End: 2018-09-07 | Stop reason: HOSPADM

## 2018-09-03 RX ORDER — MEPERIDINE HYDROCHLORIDE 50 MG/ML
12.5 INJECTION INTRAMUSCULAR; INTRAVENOUS; SUBCUTANEOUS
Status: DISCONTINUED | OUTPATIENT
Start: 2018-09-03 | End: 2018-09-03 | Stop reason: HOSPADM

## 2018-09-03 RX ORDER — SODIUM HYPOCHLORITE 1.25 MG/ML
SOLUTION TOPICAL AS NEEDED
Status: DISCONTINUED | OUTPATIENT
Start: 2018-09-03 | End: 2018-09-03 | Stop reason: HOSPADM

## 2018-09-03 RX ORDER — SODIUM CHLORIDE 0.9 % (FLUSH) 0.9 %
1-10 SYRINGE (ML) INJECTION AS NEEDED
Status: DISCONTINUED | OUTPATIENT
Start: 2018-09-03 | End: 2018-09-07 | Stop reason: HOSPADM

## 2018-09-03 RX ORDER — IBUPROFEN 200 MG
200 TABLET ORAL EVERY 4 HOURS PRN
Status: CANCELLED | OUTPATIENT
Start: 2018-09-03

## 2018-09-03 RX ORDER — MORPHINE SULFATE 2 MG/ML
1 INJECTION, SOLUTION INTRAMUSCULAR; INTRAVENOUS EVERY 4 HOURS PRN
Status: DISCONTINUED | OUTPATIENT
Start: 2018-09-03 | End: 2018-09-07 | Stop reason: HOSPADM

## 2018-09-03 RX ORDER — HEPARIN SODIUM 5000 [USP'U]/ML
5000 INJECTION, SOLUTION INTRAVENOUS; SUBCUTANEOUS EVERY 12 HOURS SCHEDULED
Status: DISCONTINUED | OUTPATIENT
Start: 2018-09-03 | End: 2018-09-04

## 2018-09-03 RX ORDER — SODIUM CHLORIDE 0.9 % (FLUSH) 0.9 %
10 SYRINGE (ML) INJECTION AS NEEDED
Status: DISCONTINUED | OUTPATIENT
Start: 2018-09-03 | End: 2018-09-07 | Stop reason: HOSPADM

## 2018-09-03 RX ORDER — MIDAZOLAM HYDROCHLORIDE 1 MG/ML
INJECTION INTRAMUSCULAR; INTRAVENOUS AS NEEDED
Status: DISCONTINUED | OUTPATIENT
Start: 2018-09-03 | End: 2018-09-03 | Stop reason: SURG

## 2018-09-03 RX ORDER — ONDANSETRON 2 MG/ML
INJECTION INTRAMUSCULAR; INTRAVENOUS AS NEEDED
Status: DISCONTINUED | OUTPATIENT
Start: 2018-09-03 | End: 2018-09-03 | Stop reason: SURG

## 2018-09-03 RX ORDER — FERROUS SULFATE 325(65) MG
325 TABLET ORAL
Status: DISCONTINUED | OUTPATIENT
Start: 2018-09-04 | End: 2018-09-07 | Stop reason: HOSPADM

## 2018-09-03 RX ORDER — FAMOTIDINE 10 MG/ML
INJECTION, SOLUTION INTRAVENOUS AS NEEDED
Status: DISCONTINUED | OUTPATIENT
Start: 2018-09-03 | End: 2018-09-03 | Stop reason: SURG

## 2018-09-03 RX ORDER — SODIUM CHLORIDE 9 MG/ML
100 INJECTION, SOLUTION INTRAVENOUS CONTINUOUS
Status: DISCONTINUED | OUTPATIENT
Start: 2018-09-03 | End: 2018-09-07 | Stop reason: HOSPADM

## 2018-09-03 RX ORDER — PROPOFOL 10 MG/ML
VIAL (ML) INTRAVENOUS AS NEEDED
Status: DISCONTINUED | OUTPATIENT
Start: 2018-09-03 | End: 2018-09-03 | Stop reason: SURG

## 2018-09-03 RX ORDER — OXYCODONE HYDROCHLORIDE AND ACETAMINOPHEN 5; 325 MG/1; MG/1
1 TABLET ORAL ONCE AS NEEDED
Status: DISCONTINUED | OUTPATIENT
Start: 2018-09-03 | End: 2018-09-03 | Stop reason: HOSPADM

## 2018-09-03 RX ORDER — SODIUM CHLORIDE, SODIUM LACTATE, POTASSIUM CHLORIDE, CALCIUM CHLORIDE 600; 310; 30; 20 MG/100ML; MG/100ML; MG/100ML; MG/100ML
125 INJECTION, SOLUTION INTRAVENOUS CONTINUOUS
Status: DISCONTINUED | OUTPATIENT
Start: 2018-09-03 | End: 2018-09-05

## 2018-09-03 RX ORDER — BUPIVACAINE HYDROCHLORIDE 5 MG/ML
INJECTION, SOLUTION PERINEURAL AS NEEDED
Status: DISCONTINUED | OUTPATIENT
Start: 2018-09-03 | End: 2018-09-03 | Stop reason: HOSPADM

## 2018-09-03 RX ORDER — IBUPROFEN 800 MG/1
800 TABLET ORAL EVERY 6 HOURS PRN
Status: DISCONTINUED | OUTPATIENT
Start: 2018-09-03 | End: 2018-09-07 | Stop reason: HOSPADM

## 2018-09-03 RX ORDER — SODIUM CHLORIDE, SODIUM LACTATE, POTASSIUM CHLORIDE, CALCIUM CHLORIDE 600; 310; 30; 20 MG/100ML; MG/100ML; MG/100ML; MG/100ML
INJECTION, SOLUTION INTRAVENOUS CONTINUOUS PRN
Status: DISCONTINUED | OUTPATIENT
Start: 2018-09-03 | End: 2018-09-03 | Stop reason: SURG

## 2018-09-03 RX ORDER — HYDROCODONE BITARTRATE AND ACETAMINOPHEN 7.5; 325 MG/1; MG/1
1 TABLET ORAL EVERY 4 HOURS PRN
Status: DISCONTINUED | OUTPATIENT
Start: 2018-09-03 | End: 2018-09-07 | Stop reason: HOSPADM

## 2018-09-03 RX ORDER — FAMOTIDINE 20 MG/1
20 TABLET, FILM COATED ORAL DAILY
Status: DISCONTINUED | OUTPATIENT
Start: 2018-09-03 | End: 2018-09-07 | Stop reason: HOSPADM

## 2018-09-03 RX ORDER — NALOXONE HCL 0.4 MG/ML
0.4 VIAL (ML) INJECTION
Status: DISCONTINUED | OUTPATIENT
Start: 2018-09-03 | End: 2018-09-07 | Stop reason: HOSPADM

## 2018-09-03 RX ORDER — SODIUM CHLORIDE 0.9 % (FLUSH) 0.9 %
1-10 SYRINGE (ML) INJECTION AS NEEDED
Status: DISCONTINUED | OUTPATIENT
Start: 2018-09-03 | End: 2018-09-03 | Stop reason: HOSPADM

## 2018-09-03 RX ORDER — ONDANSETRON 2 MG/ML
4 INJECTION INTRAMUSCULAR; INTRAVENOUS ONCE AS NEEDED
Status: DISCONTINUED | OUTPATIENT
Start: 2018-09-03 | End: 2018-09-03 | Stop reason: HOSPADM

## 2018-09-03 RX ORDER — SODIUM CHLORIDE 9 MG/ML
INJECTION, SOLUTION INTRAVENOUS AS NEEDED
Status: DISCONTINUED | OUTPATIENT
Start: 2018-09-03 | End: 2018-09-03 | Stop reason: HOSPADM

## 2018-09-03 RX ADMIN — MIDAZOLAM HYDROCHLORIDE 2 MG: 1 INJECTION, SOLUTION INTRAMUSCULAR; INTRAVENOUS at 20:13

## 2018-09-03 RX ADMIN — FENTANYL CITRATE 100 MCG: 50 INJECTION INTRAMUSCULAR; INTRAVENOUS at 20:13

## 2018-09-03 RX ADMIN — SODIUM CHLORIDE 100 ML/HR: 9 INJECTION, SOLUTION INTRAVENOUS at 22:05

## 2018-09-03 RX ADMIN — SODIUM CHLORIDE, POTASSIUM CHLORIDE, SODIUM LACTATE AND CALCIUM CHLORIDE: 600; 310; 30; 20 INJECTION, SOLUTION INTRAVENOUS at 20:10

## 2018-09-03 RX ADMIN — VANCOMYCIN HYDROCHLORIDE 1500 MG: 5 INJECTION, POWDER, LYOPHILIZED, FOR SOLUTION INTRAVENOUS at 12:51

## 2018-09-03 RX ADMIN — PIPERACILLIN SODIUM,TAZOBACTAM SODIUM 3.38 G: 3; .375 INJECTION, POWDER, FOR SOLUTION INTRAVENOUS at 12:19

## 2018-09-03 RX ADMIN — LIDOCAINE HYDROCHLORIDE 40 MG: 20 INJECTION, SOLUTION INFILTRATION; PERINEURAL at 20:16

## 2018-09-03 RX ADMIN — FAMOTIDINE 20 MG: 10 INJECTION, SOLUTION INTRAVENOUS at 20:13

## 2018-09-03 RX ADMIN — SODIUM CHLORIDE 100 ML/HR: 9 INJECTION, SOLUTION INTRAVENOUS at 16:07

## 2018-09-03 RX ADMIN — PROPOFOL 150 MG: 10 INJECTION, EMULSION INTRAVENOUS at 20:16

## 2018-09-03 RX ADMIN — ONDANSETRON 4 MG: 2 INJECTION, SOLUTION INTRAMUSCULAR; INTRAVENOUS at 20:13

## 2018-09-03 RX ADMIN — PIPERACILLIN SODIUM,TAZOBACTAM SODIUM 3.38 G: 3; .375 INJECTION, POWDER, FOR SOLUTION INTRAVENOUS at 20:13

## 2018-09-03 NOTE — ANESTHESIA PREPROCEDURE EVALUATION
Anesthesia Evaluation     Patient summary reviewed and Nursing notes reviewed   no history of anesthetic complications:  NPO Solid Status: > 8 hours  NPO Liquid Status: > 8 hours           Airway   Mallampati: II  TM distance: >3 FB  Neck ROM: full  No difficulty expected  Dental - normal exam   (+) upper dentures    Pulmonary - negative pulmonary ROS and normal exam   (-) not a smoker  Cardiovascular - negative cardio ROS and normal exam    ECG reviewed        Neuro/Psych  (+) seizures (none since age 14),     GI/Hepatic/Renal/Endo    (+)  GERD,  diabetes mellitus type 2 using insulin,     Musculoskeletal (-) negative ROS    Abdominal  - normal exam    Bowel sounds: normal.   Substance History - negative use     OB/GYN negative ob/gyn ROS         Other                      Anesthesia Plan    ASA 3     general     intravenous induction   Anesthetic plan and risks discussed with patient.    Plan discussed with CRNA.

## 2018-09-04 ENCOUNTER — APPOINTMENT (OUTPATIENT)
Dept: ULTRASOUND IMAGING | Facility: HOSPITAL | Age: 33
End: 2018-09-04
Attending: PODIATRIST

## 2018-09-04 ENCOUNTER — APPOINTMENT (OUTPATIENT)
Dept: NUCLEAR MEDICINE | Facility: HOSPITAL | Age: 33
End: 2018-09-04

## 2018-09-04 PROBLEM — L97.509 DIABETIC FOOT ULCER WITH OSTEOMYELITIS (HCC): Status: ACTIVE | Noted: 2018-09-04

## 2018-09-04 LAB
ALBUMIN SERPL-MCNC: 3.2 G/DL (ref 3.5–5)
ALBUMIN/GLOB SERPL: 1.1 G/DL (ref 1.5–2.5)
ALP SERPL-CCNC: 85 U/L (ref 35–104)
ALT SERPL W P-5'-P-CCNC: 20 U/L (ref 10–36)
ANION GAP SERPL CALCULATED.3IONS-SCNC: 5.8 MMOL/L (ref 3.6–11.2)
AST SERPL-CCNC: 22 U/L (ref 10–30)
BASOPHILS # BLD AUTO: 0.03 10*3/MM3 (ref 0–0.3)
BASOPHILS NFR BLD AUTO: 0.3 % (ref 0–2)
BILIRUB SERPL-MCNC: 0.3 MG/DL (ref 0.2–1.8)
BUN BLD-MCNC: 9 MG/DL (ref 7–21)
BUN/CREAT SERPL: 14.1 (ref 7–25)
CALCIUM SPEC-SCNC: 7.9 MG/DL (ref 7.7–10)
CHLORIDE SERPL-SCNC: 107 MMOL/L (ref 99–112)
CO2 SERPL-SCNC: 25.2 MMOL/L (ref 24.3–31.9)
CREAT BLD-MCNC: 0.64 MG/DL (ref 0.43–1.29)
CRP SERPL-MCNC: 5.46 MG/DL (ref 0–0.99)
DEPRECATED RDW RBC AUTO: 39.3 FL (ref 37–54)
EOSINOPHIL # BLD AUTO: 0.48 10*3/MM3 (ref 0–0.7)
EOSINOPHIL NFR BLD AUTO: 4.7 % (ref 0–5)
ERYTHROCYTE [DISTWIDTH] IN BLOOD BY AUTOMATED COUNT: 12.6 % (ref 11.5–14.5)
GFR SERPL CREATININE-BSD FRML MDRD: 106 ML/MIN/1.73
GLOBULIN UR ELPH-MCNC: 3 GM/DL
GLUCOSE BLD-MCNC: 143 MG/DL (ref 70–110)
GLUCOSE BLDC GLUCOMTR-MCNC: 110 MG/DL (ref 70–130)
GLUCOSE BLDC GLUCOMTR-MCNC: 133 MG/DL (ref 70–130)
GLUCOSE BLDC GLUCOMTR-MCNC: 196 MG/DL (ref 70–130)
GLUCOSE BLDC GLUCOMTR-MCNC: 210 MG/DL (ref 70–130)
GLUCOSE BLDC GLUCOMTR-MCNC: 92 MG/DL (ref 70–130)
HCT VFR BLD AUTO: 30.1 % (ref 37–47)
HGB BLD-MCNC: 10.1 G/DL (ref 12–16)
IMM GRANULOCYTES # BLD: 0.01 10*3/MM3 (ref 0–0.03)
IMM GRANULOCYTES NFR BLD: 0.1 % (ref 0–0.5)
LYMPHOCYTES # BLD AUTO: 2.47 10*3/MM3 (ref 1–3)
LYMPHOCYTES NFR BLD AUTO: 24.2 % (ref 21–51)
MCH RBC QN AUTO: 29.6 PG (ref 27–33)
MCHC RBC AUTO-ENTMCNC: 33.6 G/DL (ref 33–37)
MCV RBC AUTO: 88.3 FL (ref 80–94)
MONOCYTES # BLD AUTO: 0.65 10*3/MM3 (ref 0.1–0.9)
MONOCYTES NFR BLD AUTO: 6.4 % (ref 0–10)
NEUTROPHILS # BLD AUTO: 6.55 10*3/MM3 (ref 1.4–6.5)
NEUTROPHILS NFR BLD AUTO: 64.3 % (ref 30–70)
OSMOLALITY SERPL CALC.SUM OF ELEC: 276.8 MOSM/KG (ref 273–305)
PLATELET # BLD AUTO: 324 10*3/MM3 (ref 130–400)
PMV BLD AUTO: 9.4 FL (ref 6–10)
POTASSIUM BLD-SCNC: 3.6 MMOL/L (ref 3.5–5.3)
PROT SERPL-MCNC: 6.2 G/DL (ref 6–8)
RBC # BLD AUTO: 3.41 10*6/MM3 (ref 4.2–5.4)
SODIUM BLD-SCNC: 138 MMOL/L (ref 135–153)
WBC NRBC COR # BLD: 10.19 10*3/MM3 (ref 4.5–12.5)

## 2018-09-04 PROCEDURE — 25010000002 VANCOMYCIN PER 500 MG

## 2018-09-04 PROCEDURE — 85025 COMPLETE CBC W/AUTO DIFF WBC: CPT | Performed by: PODIATRIST

## 2018-09-04 PROCEDURE — 0 TECHNETIUM OXIDRONATE KIT: Performed by: INTERNAL MEDICINE

## 2018-09-04 PROCEDURE — 25010000002 CEFTRIAXONE: Performed by: NURSE PRACTITIONER

## 2018-09-04 PROCEDURE — 99232 SBSQ HOSP IP/OBS MODERATE 35: CPT | Performed by: PHYSICIAN ASSISTANT

## 2018-09-04 PROCEDURE — 25010000002 PIPERACILLIN-TAZOBACTAM

## 2018-09-04 PROCEDURE — 94799 UNLISTED PULMONARY SVC/PX: CPT

## 2018-09-04 PROCEDURE — 78315 BONE IMAGING 3 PHASE: CPT | Performed by: RADIOLOGY

## 2018-09-04 PROCEDURE — 63710000001 INSULIN ASPART PER 5 UNITS: Performed by: INTERNAL MEDICINE

## 2018-09-04 PROCEDURE — 25010000002 ENOXAPARIN PER 10 MG: Performed by: PODIATRIST

## 2018-09-04 PROCEDURE — 63710000001 INSULIN ASPART PER 5 UNITS: Performed by: PHYSICIAN ASSISTANT

## 2018-09-04 PROCEDURE — 63710000001 INSULIN DETEMIR PER 5 UNITS: Performed by: INTERNAL MEDICINE

## 2018-09-04 PROCEDURE — 78315 BONE IMAGING 3 PHASE: CPT

## 2018-09-04 PROCEDURE — 82962 GLUCOSE BLOOD TEST: CPT

## 2018-09-04 PROCEDURE — A9561 TC99M OXIDRONATE: HCPCS | Performed by: INTERNAL MEDICINE

## 2018-09-04 PROCEDURE — 86140 C-REACTIVE PROTEIN: CPT | Performed by: PHYSICIAN ASSISTANT

## 2018-09-04 PROCEDURE — 93925 LOWER EXTREMITY STUDY: CPT | Performed by: RADIOLOGY

## 2018-09-04 PROCEDURE — 93925 LOWER EXTREMITY STUDY: CPT

## 2018-09-04 PROCEDURE — 80053 COMPREHEN METABOLIC PANEL: CPT | Performed by: PHYSICIAN ASSISTANT

## 2018-09-04 RX ORDER — POVIDONE-IODINE 10 MG/G
OINTMENT TOPICAL
Status: DISCONTINUED | OUTPATIENT
Start: 2018-09-04 | End: 2018-09-05

## 2018-09-04 RX ORDER — L.ACID,PARA/B.BIFIDUM/S.THERM 8B CELL
1 CAPSULE ORAL DAILY
Status: DISCONTINUED | OUTPATIENT
Start: 2018-09-04 | End: 2018-09-07 | Stop reason: HOSPADM

## 2018-09-04 RX ADMIN — INSULIN ASPART 5 UNITS: 100 INJECTION, SOLUTION INTRAVENOUS; SUBCUTANEOUS at 17:23

## 2018-09-04 RX ADMIN — CEFTRIAXONE 2 G: 2 INJECTION, POWDER, FOR SOLUTION INTRAMUSCULAR; INTRAVENOUS at 15:01

## 2018-09-04 RX ADMIN — HYDROCODONE BITARTRATE AND ACETAMINOPHEN 1 TABLET: 7.5; 325 TABLET ORAL at 02:06

## 2018-09-04 RX ADMIN — Medication 1 CAPSULE: at 17:23

## 2018-09-04 RX ADMIN — DOCUSATE SODIUM 100 MG: 100 CAPSULE, LIQUID FILLED ORAL at 19:12

## 2018-09-04 RX ADMIN — INSULIN DETEMIR 20 UNITS: 100 INJECTION, SOLUTION SUBCUTANEOUS at 19:17

## 2018-09-04 RX ADMIN — DOCUSATE SODIUM 100 MG: 100 CAPSULE, LIQUID FILLED ORAL at 09:08

## 2018-09-04 RX ADMIN — FAMOTIDINE 20 MG: 20 TABLET, FILM COATED ORAL at 09:08

## 2018-09-04 RX ADMIN — VANCOMYCIN HYDROCHLORIDE 1000 MG: 5 INJECTION, POWDER, LYOPHILIZED, FOR SOLUTION INTRAVENOUS at 15:01

## 2018-09-04 RX ADMIN — INSULIN ASPART 2 UNITS: 100 INJECTION, SOLUTION INTRAVENOUS; SUBCUTANEOUS at 19:12

## 2018-09-04 RX ADMIN — POVIDONE-IODINE: 100 OINTMENT TOPICAL at 17:24

## 2018-09-04 RX ADMIN — PIPERACILLIN SODIUM,TAZOBACTAM SODIUM 3.38 G: 3; .375 INJECTION, POWDER, FOR SOLUTION INTRAVENOUS at 09:10

## 2018-09-04 RX ADMIN — VANCOMYCIN HYDROCHLORIDE 1000 MG: 5 INJECTION, POWDER, LYOPHILIZED, FOR SOLUTION INTRAVENOUS at 20:43

## 2018-09-04 RX ADMIN — INSULIN ASPART 5 UNITS: 100 INJECTION, SOLUTION INTRAVENOUS; SUBCUTANEOUS at 12:07

## 2018-09-04 RX ADMIN — TECHNETIUM TC 99M OXIDRONATE 1 DOSE: 3.15 INJECTION, POWDER, LYOPHILIZED, FOR SOLUTION INTRAVENOUS at 10:15

## 2018-09-04 RX ADMIN — PIPERACILLIN SODIUM,TAZOBACTAM SODIUM 3.38 G: 3; .375 INJECTION, POWDER, FOR SOLUTION INTRAVENOUS at 02:10

## 2018-09-04 RX ADMIN — FERROUS SULFATE TAB 325 MG (65 MG ELEMENTAL FE) 325 MG: 325 (65 FE) TAB at 09:08

## 2018-09-04 RX ADMIN — INSULIN ASPART 4 UNITS: 100 INJECTION, SOLUTION INTRAVENOUS; SUBCUTANEOUS at 12:06

## 2018-09-04 RX ADMIN — HYDROCODONE BITARTRATE AND ACETAMINOPHEN 1 TABLET: 7.5; 325 TABLET ORAL at 19:16

## 2018-09-04 RX ADMIN — ENOXAPARIN SODIUM 40 MG: 40 INJECTION SUBCUTANEOUS at 09:08

## 2018-09-04 RX ADMIN — VANCOMYCIN HYDROCHLORIDE 1000 MG: 5 INJECTION, POWDER, LYOPHILIZED, FOR SOLUTION INTRAVENOUS at 05:43

## 2018-09-04 RX ADMIN — Medication 1 TABLET: at 09:08

## 2018-09-04 NOTE — ANESTHESIA PROCEDURE NOTES
Airway    General Information and Staff    CRNA: KATHERINE LUIS    Indications and Patient Condition    Preoxygenated: yes  Mask difficulty assessment: 0 - not attempted    Final Airway Details  Final airway type: supraglottic airway      Successful airway: classic  Size 4    Number of attempts at approach: 1

## 2018-09-04 NOTE — ANESTHESIA POSTPROCEDURE EVALUATION
Patient: Cynthia Ríos    Procedure Summary     Date:  09/03/18 Room / Location:  [facility] OR 02 /  [facility] OR    Anesthesia Start:  2010 Anesthesia Stop:  2044    Procedure:  LEFT FOOT AND ANKLE WOUND DEBRIDEMENT (Left ) Diagnosis:       Cellulitis of left ankle      (Cellulitis of left ankle [L03.116])    Surgeon:  Cale Edmond MD Provider:  Stalin Yanes MD    Anesthesia Type:  general ASA Status:  3          Anesthesia Type: general  Last vitals  BP   121/80 (09/03/18 2056)   Temp   97.2 °F (36.2 °C) (09/03/18 2046)   Pulse   88 (09/03/18 2056)   Resp   15 (09/03/18 2056)     SpO2   98 % (09/03/18 2056)     Post Anesthesia Care and Evaluation    Patient location during evaluation: PACU  Patient participation: complete - patient participated  Level of consciousness: awake and alert  Pain score: 1  Pain management: adequate  Airway patency: patent  Anesthetic complications: No anesthetic complications  PONV Status: controlled  Cardiovascular status: acceptable  Respiratory status: acceptable  Hydration status: acceptable

## 2018-09-05 ENCOUNTER — HOSPITAL ENCOUNTER (OUTPATIENT)
Dept: INFUSION THERAPY | Facility: HOSPITAL | Age: 33
Setting detail: INFUSION SERIES
Discharge: HOME | End: 2018-09-05
Attending: INTERNAL MEDICINE

## 2018-09-05 LAB
ALBUMIN SERPL-MCNC: 3.1 G/DL (ref 3.5–5)
ALBUMIN/GLOB SERPL: 1 G/DL (ref 1.5–2.5)
ALP SERPL-CCNC: 89 U/L (ref 35–104)
ALT SERPL W P-5'-P-CCNC: 30 U/L (ref 10–36)
ANION GAP SERPL CALCULATED.3IONS-SCNC: 5.1 MMOL/L (ref 3.6–11.2)
AST SERPL-CCNC: 26 U/L (ref 10–30)
BACTERIA SPEC AEROBE CULT: NORMAL
BACTERIA SPEC AEROBE CULT: NORMAL
BASOPHILS # BLD AUTO: 0.04 10*3/MM3 (ref 0–0.3)
BASOPHILS NFR BLD AUTO: 0.5 % (ref 0–2)
BILIRUB SERPL-MCNC: 0.2 MG/DL (ref 0.2–1.8)
BUN BLD-MCNC: 10 MG/DL (ref 7–21)
BUN/CREAT SERPL: 16.9 (ref 7–25)
CALCIUM SPEC-SCNC: 8.5 MG/DL (ref 7.7–10)
CHLORIDE SERPL-SCNC: 107 MMOL/L (ref 99–112)
CO2 SERPL-SCNC: 27.9 MMOL/L (ref 24.3–31.9)
CREAT BLD-MCNC: 0.59 MG/DL (ref 0.43–1.29)
CRP SERPL-MCNC: 4.1 MG/DL (ref 0–0.99)
DEPRECATED RDW RBC AUTO: 38.1 FL (ref 37–54)
EOSINOPHIL # BLD AUTO: 0.39 10*3/MM3 (ref 0–0.7)
EOSINOPHIL NFR BLD AUTO: 5 % (ref 0–5)
ERYTHROCYTE [DISTWIDTH] IN BLOOD BY AUTOMATED COUNT: 12.3 % (ref 11.5–14.5)
GFR SERPL CREATININE-BSD FRML MDRD: 116 ML/MIN/1.73
GLOBULIN UR ELPH-MCNC: 3.1 GM/DL
GLUCOSE BLD-MCNC: 112 MG/DL (ref 70–110)
GLUCOSE BLDC GLUCOMTR-MCNC: 124 MG/DL (ref 70–130)
GLUCOSE BLDC GLUCOMTR-MCNC: 193 MG/DL (ref 70–130)
GLUCOSE BLDC GLUCOMTR-MCNC: 220 MG/DL (ref 70–130)
GLUCOSE BLDC GLUCOMTR-MCNC: 246 MG/DL (ref 70–130)
HCT VFR BLD AUTO: 30.3 % (ref 37–47)
HGB BLD-MCNC: 10 G/DL (ref 12–16)
IMM GRANULOCYTES # BLD: 0.01 10*3/MM3 (ref 0–0.03)
IMM GRANULOCYTES NFR BLD: 0.1 % (ref 0–0.5)
LYMPHOCYTES # BLD AUTO: 3.28 10*3/MM3 (ref 1–3)
LYMPHOCYTES NFR BLD AUTO: 41.9 % (ref 21–51)
MCH RBC QN AUTO: 29 PG (ref 27–33)
MCHC RBC AUTO-ENTMCNC: 33 G/DL (ref 33–37)
MCV RBC AUTO: 87.8 FL (ref 80–94)
MONOCYTES # BLD AUTO: 0.42 10*3/MM3 (ref 0.1–0.9)
MONOCYTES NFR BLD AUTO: 5.4 % (ref 0–10)
NEUTROPHILS # BLD AUTO: 3.69 10*3/MM3 (ref 1.4–6.5)
NEUTROPHILS NFR BLD AUTO: 47.1 % (ref 30–70)
OSMOLALITY SERPL CALC.SUM OF ELEC: 279.2 MOSM/KG (ref 273–305)
PLATELET # BLD AUTO: 358 10*3/MM3 (ref 130–400)
PMV BLD AUTO: 9.1 FL (ref 6–10)
POTASSIUM BLD-SCNC: 3.8 MMOL/L (ref 3.5–5.3)
PROT SERPL-MCNC: 6.2 G/DL (ref 6–8)
RBC # BLD AUTO: 3.45 10*6/MM3 (ref 4.2–5.4)
SODIUM BLD-SCNC: 140 MMOL/L (ref 135–153)
VANCOMYCIN TROUGH SERPL-MCNC: 16.1 MCG/ML (ref 5–15)
WBC NRBC COR # BLD: 7.83 10*3/MM3 (ref 4.5–12.5)

## 2018-09-05 PROCEDURE — 99232 SBSQ HOSP IP/OBS MODERATE 35: CPT | Performed by: PHYSICIAN ASSISTANT

## 2018-09-05 PROCEDURE — 86140 C-REACTIVE PROTEIN: CPT | Performed by: NURSE PRACTITIONER

## 2018-09-05 PROCEDURE — 63710000001 INSULIN ASPART PER 5 UNITS: Performed by: PHYSICIAN ASSISTANT

## 2018-09-05 PROCEDURE — 99254 IP/OBS CNSLTJ NEW/EST MOD 60: CPT | Performed by: INTERNAL MEDICINE

## 2018-09-05 PROCEDURE — 94799 UNLISTED PULMONARY SVC/PX: CPT

## 2018-09-05 PROCEDURE — 82962 GLUCOSE BLOOD TEST: CPT

## 2018-09-05 PROCEDURE — 63710000001 INSULIN ASPART PER 5 UNITS: Performed by: INTERNAL MEDICINE

## 2018-09-05 PROCEDURE — 80053 COMPREHEN METABOLIC PANEL: CPT | Performed by: PHYSICIAN ASSISTANT

## 2018-09-05 PROCEDURE — 63710000001 INSULIN DETEMIR PER 5 UNITS: Performed by: INTERNAL MEDICINE

## 2018-09-05 PROCEDURE — 25010000002 VANCOMYCIN PER 500 MG

## 2018-09-05 PROCEDURE — 80202 ASSAY OF VANCOMYCIN: CPT

## 2018-09-05 PROCEDURE — 25010000002 ENOXAPARIN PER 10 MG: Performed by: PODIATRIST

## 2018-09-05 PROCEDURE — 85025 COMPLETE CBC W/AUTO DIFF WBC: CPT | Performed by: PHYSICIAN ASSISTANT

## 2018-09-05 RX ORDER — POVIDONE-IODINE 10 MG/G
OINTMENT TOPICAL EVERY 12 HOURS SCHEDULED
Status: DISCONTINUED | OUTPATIENT
Start: 2018-09-05 | End: 2018-09-07 | Stop reason: HOSPADM

## 2018-09-05 RX ADMIN — ENOXAPARIN SODIUM 40 MG: 40 INJECTION SUBCUTANEOUS at 08:46

## 2018-09-05 RX ADMIN — Medication 1 CAPSULE: at 08:46

## 2018-09-05 RX ADMIN — INSULIN ASPART 5 UNITS: 100 INJECTION, SOLUTION INTRAVENOUS; SUBCUTANEOUS at 11:50

## 2018-09-05 RX ADMIN — POVIDONE-IODINE: 100 OINTMENT TOPICAL at 20:22

## 2018-09-05 RX ADMIN — DOCUSATE SODIUM 100 MG: 100 CAPSULE, LIQUID FILLED ORAL at 08:46

## 2018-09-05 RX ADMIN — INSULIN DETEMIR 20 UNITS: 100 INJECTION, SOLUTION SUBCUTANEOUS at 20:23

## 2018-09-05 RX ADMIN — VANCOMYCIN HYDROCHLORIDE 1000 MG: 5 INJECTION, POWDER, LYOPHILIZED, FOR SOLUTION INTRAVENOUS at 20:22

## 2018-09-05 RX ADMIN — VANCOMYCIN HYDROCHLORIDE 1000 MG: 5 INJECTION, POWDER, LYOPHILIZED, FOR SOLUTION INTRAVENOUS at 05:12

## 2018-09-05 RX ADMIN — POVIDONE-IODINE: 100 OINTMENT TOPICAL at 08:49

## 2018-09-05 RX ADMIN — INSULIN ASPART 4 UNITS: 100 INJECTION, SOLUTION INTRAVENOUS; SUBCUTANEOUS at 11:52

## 2018-09-05 RX ADMIN — Medication 1 TABLET: at 08:46

## 2018-09-05 RX ADMIN — FAMOTIDINE 20 MG: 20 TABLET, FILM COATED ORAL at 08:46

## 2018-09-05 RX ADMIN — INSULIN ASPART 5 UNITS: 100 INJECTION, SOLUTION INTRAVENOUS; SUBCUTANEOUS at 17:32

## 2018-09-05 RX ADMIN — INSULIN ASPART 2 UNITS: 100 INJECTION, SOLUTION INTRAVENOUS; SUBCUTANEOUS at 17:33

## 2018-09-05 RX ADMIN — INSULIN ASPART 4 UNITS: 100 INJECTION, SOLUTION INTRAVENOUS; SUBCUTANEOUS at 20:22

## 2018-09-05 RX ADMIN — INSULIN ASPART 5 UNITS: 100 INJECTION, SOLUTION INTRAVENOUS; SUBCUTANEOUS at 08:47

## 2018-09-05 RX ADMIN — DOCUSATE SODIUM 100 MG: 100 CAPSULE, LIQUID FILLED ORAL at 20:22

## 2018-09-05 RX ADMIN — VANCOMYCIN HYDROCHLORIDE 1000 MG: 5 INJECTION, POWDER, LYOPHILIZED, FOR SOLUTION INTRAVENOUS at 13:41

## 2018-09-05 RX ADMIN — FERROUS SULFATE TAB 325 MG (65 MG ELEMENTAL FE) 325 MG: 325 (65 FE) TAB at 08:46

## 2018-09-06 LAB
ALBUMIN SERPL-MCNC: 3.3 G/DL (ref 3.5–5)
ALBUMIN/GLOB SERPL: 1.1 G/DL (ref 1.5–2.5)
ALP SERPL-CCNC: 96 U/L (ref 35–104)
ALT SERPL W P-5'-P-CCNC: 30 U/L (ref 10–36)
ANION GAP SERPL CALCULATED.3IONS-SCNC: 6.8 MMOL/L (ref 3.6–11.2)
AST SERPL-CCNC: 22 U/L (ref 10–30)
BACTERIA SPEC AEROBE CULT: ABNORMAL
BACTERIA SPEC AEROBE CULT: ABNORMAL
BASOPHILS # BLD AUTO: 0.03 10*3/MM3 (ref 0–0.3)
BASOPHILS NFR BLD AUTO: 0.4 % (ref 0–2)
BILIRUB SERPL-MCNC: 0.1 MG/DL (ref 0.2–1.8)
BUN BLD-MCNC: 7 MG/DL (ref 7–21)
BUN/CREAT SERPL: 12.7 (ref 7–25)
CALCIUM SPEC-SCNC: 9.1 MG/DL (ref 7.7–10)
CHLORIDE SERPL-SCNC: 106 MMOL/L (ref 99–112)
CHOLEST SERPL-MCNC: 108 MG/DL (ref 0–200)
CO2 SERPL-SCNC: 28.2 MMOL/L (ref 24.3–31.9)
CREAT BLD-MCNC: 0.55 MG/DL (ref 0.43–1.29)
CRP SERPL-MCNC: 2.44 MG/DL (ref 0–0.99)
DEPRECATED RDW RBC AUTO: 36.7 FL (ref 37–54)
EOSINOPHIL # BLD AUTO: 0.35 10*3/MM3 (ref 0–0.7)
EOSINOPHIL NFR BLD AUTO: 4.1 % (ref 0–5)
ERYTHROCYTE [DISTWIDTH] IN BLOOD BY AUTOMATED COUNT: 12.2 % (ref 11.5–14.5)
GFR SERPL CREATININE-BSD FRML MDRD: 126 ML/MIN/1.73
GLOBULIN UR ELPH-MCNC: 3 GM/DL
GLUCOSE BLD-MCNC: 215 MG/DL (ref 70–110)
GLUCOSE BLDC GLUCOMTR-MCNC: 194 MG/DL (ref 70–130)
GLUCOSE BLDC GLUCOMTR-MCNC: 215 MG/DL (ref 70–130)
GLUCOSE BLDC GLUCOMTR-MCNC: 223 MG/DL (ref 70–130)
GLUCOSE BLDC GLUCOMTR-MCNC: 260 MG/DL (ref 70–130)
GRAM STN SPEC: ABNORMAL
HCT VFR BLD AUTO: 31.2 % (ref 37–47)
HDLC SERPL-MCNC: 24 MG/DL (ref 60–100)
HGB BLD-MCNC: 10.6 G/DL (ref 12–16)
IMM GRANULOCYTES # BLD: 0.03 10*3/MM3 (ref 0–0.03)
IMM GRANULOCYTES NFR BLD: 0.4 % (ref 0–0.5)
LDLC SERPL CALC-MCNC: 58 MG/DL (ref 0–100)
LDLC/HDLC SERPL: 2.4 {RATIO}
LYMPHOCYTES # BLD AUTO: 3.04 10*3/MM3 (ref 1–3)
LYMPHOCYTES NFR BLD AUTO: 35.8 % (ref 21–51)
MCH RBC QN AUTO: 29.4 PG (ref 27–33)
MCHC RBC AUTO-ENTMCNC: 34 G/DL (ref 33–37)
MCV RBC AUTO: 86.7 FL (ref 80–94)
MONOCYTES # BLD AUTO: 0.53 10*3/MM3 (ref 0.1–0.9)
MONOCYTES NFR BLD AUTO: 6.2 % (ref 0–10)
NEUTROPHILS # BLD AUTO: 4.51 10*3/MM3 (ref 1.4–6.5)
NEUTROPHILS NFR BLD AUTO: 53.1 % (ref 30–70)
OSMOLALITY SERPL CALC.SUM OF ELEC: 285.7 MOSM/KG (ref 273–305)
PLATELET # BLD AUTO: 378 10*3/MM3 (ref 130–400)
PMV BLD AUTO: 9.1 FL (ref 6–10)
POTASSIUM BLD-SCNC: 3.7 MMOL/L (ref 3.5–5.3)
PROT SERPL-MCNC: 6.3 G/DL (ref 6–8)
RBC # BLD AUTO: 3.6 10*6/MM3 (ref 4.2–5.4)
SODIUM BLD-SCNC: 141 MMOL/L (ref 135–153)
TRIGL SERPL-MCNC: 132 MG/DL (ref 0–150)
VLDLC SERPL-MCNC: 26.4 MG/DL
WBC NRBC COR # BLD: 8.49 10*3/MM3 (ref 4.5–12.5)

## 2018-09-06 PROCEDURE — 63710000001 INSULIN ASPART PER 5 UNITS: Performed by: PHYSICIAN ASSISTANT

## 2018-09-06 PROCEDURE — 25010000002 DAPTOMYCIN PER 1 MG: Performed by: PHYSICIAN ASSISTANT

## 2018-09-06 PROCEDURE — 80061 LIPID PANEL: CPT | Performed by: PHYSICIAN ASSISTANT

## 2018-09-06 PROCEDURE — 86140 C-REACTIVE PROTEIN: CPT | Performed by: PHYSICIAN ASSISTANT

## 2018-09-06 PROCEDURE — C1751 CATH, INF, PER/CENT/MIDLINE: HCPCS

## 2018-09-06 PROCEDURE — 63710000001 INSULIN DETEMIR PER 5 UNITS: Performed by: INTERNAL MEDICINE

## 2018-09-06 PROCEDURE — 80053 COMPREHEN METABOLIC PANEL: CPT | Performed by: PHYSICIAN ASSISTANT

## 2018-09-06 PROCEDURE — 02HV33Z INSERTION OF INFUSION DEVICE INTO SUPERIOR VENA CAVA, PERCUTANEOUS APPROACH: ICD-10-PCS | Performed by: INTERNAL MEDICINE

## 2018-09-06 PROCEDURE — 82962 GLUCOSE BLOOD TEST: CPT

## 2018-09-06 PROCEDURE — 99233 SBSQ HOSP IP/OBS HIGH 50: CPT | Performed by: PHYSICIAN ASSISTANT

## 2018-09-06 PROCEDURE — 25010000002 VANCOMYCIN PER 500 MG

## 2018-09-06 PROCEDURE — 25010000002 ENOXAPARIN PER 10 MG: Performed by: PODIATRIST

## 2018-09-06 PROCEDURE — 63710000001 INSULIN ASPART PER 5 UNITS: Performed by: INTERNAL MEDICINE

## 2018-09-06 PROCEDURE — 85025 COMPLETE CBC W/AUTO DIFF WBC: CPT | Performed by: PHYSICIAN ASSISTANT

## 2018-09-06 PROCEDURE — 94799 UNLISTED PULMONARY SVC/PX: CPT

## 2018-09-06 RX ORDER — DEXTROSE MONOHYDRATE 25 G/50ML
25 INJECTION, SOLUTION INTRAVENOUS
Status: DISCONTINUED | OUTPATIENT
Start: 2018-09-06 | End: 2018-09-07 | Stop reason: HOSPADM

## 2018-09-06 RX ORDER — SODIUM CHLORIDE 0.9 % (FLUSH) 0.9 %
20 SYRINGE (ML) INJECTION AS NEEDED
Status: DISCONTINUED | OUTPATIENT
Start: 2018-09-06 | End: 2018-09-07 | Stop reason: HOSPADM

## 2018-09-06 RX ORDER — SODIUM CHLORIDE 0.9 % (FLUSH) 0.9 %
10 SYRINGE (ML) INJECTION AS NEEDED
Status: DISCONTINUED | OUTPATIENT
Start: 2018-09-06 | End: 2018-09-07 | Stop reason: HOSPADM

## 2018-09-06 RX ORDER — NICOTINE POLACRILEX 4 MG
15 LOZENGE BUCCAL
Status: DISCONTINUED | OUTPATIENT
Start: 2018-09-06 | End: 2018-09-07 | Stop reason: HOSPADM

## 2018-09-06 RX ADMIN — INSULIN ASPART 8 UNITS: 100 INJECTION, SOLUTION INTRAVENOUS; SUBCUTANEOUS at 17:27

## 2018-09-06 RX ADMIN — VANCOMYCIN HYDROCHLORIDE 1000 MG: 5 INJECTION, POWDER, LYOPHILIZED, FOR SOLUTION INTRAVENOUS at 05:14

## 2018-09-06 RX ADMIN — Medication 1 TABLET: at 08:24

## 2018-09-06 RX ADMIN — FERROUS SULFATE TAB 325 MG (65 MG ELEMENTAL FE) 325 MG: 325 (65 FE) TAB at 08:24

## 2018-09-06 RX ADMIN — Medication 1 CAPSULE: at 08:24

## 2018-09-06 RX ADMIN — INSULIN DETEMIR 25 UNITS: 100 INJECTION, SOLUTION SUBCUTANEOUS at 21:00

## 2018-09-06 RX ADMIN — INSULIN ASPART 2 UNITS: 100 INJECTION, SOLUTION INTRAVENOUS; SUBCUTANEOUS at 11:36

## 2018-09-06 RX ADMIN — INSULIN ASPART 5 UNITS: 100 INJECTION, SOLUTION INTRAVENOUS; SUBCUTANEOUS at 08:26

## 2018-09-06 RX ADMIN — FAMOTIDINE 20 MG: 20 TABLET, FILM COATED ORAL at 08:24

## 2018-09-06 RX ADMIN — INSULIN ASPART 5 UNITS: 100 INJECTION, SOLUTION INTRAVENOUS; SUBCUTANEOUS at 11:35

## 2018-09-06 RX ADMIN — ENOXAPARIN SODIUM 40 MG: 40 INJECTION SUBCUTANEOUS at 08:24

## 2018-09-06 RX ADMIN — POVIDONE-IODINE: 100 OINTMENT TOPICAL at 08:28

## 2018-09-06 RX ADMIN — DOCUSATE SODIUM 100 MG: 100 CAPSULE, LIQUID FILLED ORAL at 21:52

## 2018-09-06 RX ADMIN — DOCUSATE SODIUM 100 MG: 100 CAPSULE, LIQUID FILLED ORAL at 08:24

## 2018-09-06 RX ADMIN — VANCOMYCIN HYDROCHLORIDE 1000 MG: 5 INJECTION, POWDER, LYOPHILIZED, FOR SOLUTION INTRAVENOUS at 13:22

## 2018-09-06 RX ADMIN — HYDROCODONE BITARTRATE AND ACETAMINOPHEN 1 TABLET: 7.5; 325 TABLET ORAL at 11:52

## 2018-09-06 RX ADMIN — INSULIN ASPART 5 UNITS: 100 INJECTION, SOLUTION INTRAVENOUS; SUBCUTANEOUS at 22:07

## 2018-09-06 RX ADMIN — INSULIN ASPART 5 UNITS: 100 INJECTION, SOLUTION INTRAVENOUS; SUBCUTANEOUS at 17:26

## 2018-09-06 RX ADMIN — POVIDONE-IODINE: 100 OINTMENT TOPICAL at 22:07

## 2018-09-06 RX ADMIN — HYDROCODONE BITARTRATE AND ACETAMINOPHEN 1 TABLET: 7.5; 325 TABLET ORAL at 21:52

## 2018-09-06 RX ADMIN — INSULIN ASPART 4 UNITS: 100 INJECTION, SOLUTION INTRAVENOUS; SUBCUTANEOUS at 08:27

## 2018-09-06 RX ADMIN — DAPTOMYCIN 450 MG: 500 INJECTION, POWDER, LYOPHILIZED, FOR SOLUTION INTRAVENOUS at 15:26

## 2018-09-07 ENCOUNTER — TRANSCRIBE ORDERS (OUTPATIENT)
Dept: INFUSION THERAPY | Facility: HOSPITAL | Age: 33
End: 2018-09-07

## 2018-09-07 ENCOUNTER — APPOINTMENT (OUTPATIENT)
Dept: INFUSION THERAPY | Facility: HOSPITAL | Age: 33
End: 2018-09-07
Attending: INTERNAL MEDICINE

## 2018-09-07 VITALS
DIASTOLIC BLOOD PRESSURE: 83 MMHG | HEART RATE: 88 BPM | BODY MASS INDEX: 28.86 KG/M2 | HEIGHT: 65 IN | RESPIRATION RATE: 18 BRPM | OXYGEN SATURATION: 99 % | TEMPERATURE: 98.1 F | SYSTOLIC BLOOD PRESSURE: 117 MMHG | WEIGHT: 173.25 LBS

## 2018-09-07 DIAGNOSIS — Z86.19: Primary | ICD-10-CM

## 2018-09-07 DIAGNOSIS — L03.116 CELLULITIS OF LEFT ANKLE: Primary | ICD-10-CM

## 2018-09-07 LAB
ANION GAP SERPL CALCULATED.3IONS-SCNC: 3.7 MMOL/L (ref 3.6–11.2)
BASOPHILS # BLD AUTO: 0.04 10*3/MM3 (ref 0–0.3)
BASOPHILS NFR BLD AUTO: 0.4 % (ref 0–2)
BUN BLD-MCNC: 9 MG/DL (ref 7–21)
BUN/CREAT SERPL: 14.3 (ref 7–25)
CALCIUM SPEC-SCNC: 9 MG/DL (ref 7.7–10)
CHLORIDE SERPL-SCNC: 104 MMOL/L (ref 99–112)
CK SERPL-CCNC: 20 U/L (ref 24–173)
CO2 SERPL-SCNC: 33.3 MMOL/L (ref 24.3–31.9)
CREAT BLD-MCNC: 0.63 MG/DL (ref 0.43–1.29)
CRP SERPL-MCNC: 1.69 MG/DL (ref 0–0.99)
DEPRECATED RDW RBC AUTO: 36.1 FL (ref 37–54)
EOSINOPHIL # BLD AUTO: 0.4 10*3/MM3 (ref 0–0.7)
EOSINOPHIL NFR BLD AUTO: 3.9 % (ref 0–5)
ERYTHROCYTE [DISTWIDTH] IN BLOOD BY AUTOMATED COUNT: 12.2 % (ref 11.5–14.5)
GFR SERPL CREATININE-BSD FRML MDRD: 108 ML/MIN/1.73
GLUCOSE BLD-MCNC: 221 MG/DL (ref 70–110)
GLUCOSE BLDC GLUCOMTR-MCNC: 131 MG/DL (ref 70–130)
GLUCOSE BLDC GLUCOMTR-MCNC: 262 MG/DL (ref 70–130)
HCT VFR BLD AUTO: 30.3 % (ref 37–47)
HGB BLD-MCNC: 10.5 G/DL (ref 12–16)
IMM GRANULOCYTES # BLD: 0.04 10*3/MM3 (ref 0–0.03)
IMM GRANULOCYTES NFR BLD: 0.4 % (ref 0–0.5)
LYMPHOCYTES # BLD AUTO: 3.4 10*3/MM3 (ref 1–3)
LYMPHOCYTES NFR BLD AUTO: 33.2 % (ref 21–51)
MAGNESIUM SERPL-MCNC: 1.8 MG/DL (ref 1.7–2.6)
MCH RBC QN AUTO: 29.7 PG (ref 27–33)
MCHC RBC AUTO-ENTMCNC: 34.7 G/DL (ref 33–37)
MCV RBC AUTO: 85.8 FL (ref 80–94)
MONOCYTES # BLD AUTO: 0.59 10*3/MM3 (ref 0.1–0.9)
MONOCYTES NFR BLD AUTO: 5.8 % (ref 0–10)
NEUTROPHILS # BLD AUTO: 5.78 10*3/MM3 (ref 1.4–6.5)
NEUTROPHILS NFR BLD AUTO: 56.3 % (ref 30–70)
OSMOLALITY SERPL CALC.SUM OF ELEC: 286.8 MOSM/KG (ref 273–305)
PLATELET # BLD AUTO: 400 10*3/MM3 (ref 130–400)
PMV BLD AUTO: 9 FL (ref 6–10)
POTASSIUM BLD-SCNC: 3.7 MMOL/L (ref 3.5–5.3)
RBC # BLD AUTO: 3.53 10*6/MM3 (ref 4.2–5.4)
SODIUM BLD-SCNC: 141 MMOL/L (ref 135–153)
WBC NRBC COR # BLD: 10.25 10*3/MM3 (ref 4.5–12.5)

## 2018-09-07 PROCEDURE — 63710000001 INSULIN ASPART PER 5 UNITS: Performed by: PHYSICIAN ASSISTANT

## 2018-09-07 PROCEDURE — 63710000001 INSULIN ASPART PER 5 UNITS: Performed by: INTERNAL MEDICINE

## 2018-09-07 PROCEDURE — 85025 COMPLETE CBC W/AUTO DIFF WBC: CPT | Performed by: INTERNAL MEDICINE

## 2018-09-07 PROCEDURE — 82550 ASSAY OF CK (CPK): CPT | Performed by: INTERNAL MEDICINE

## 2018-09-07 PROCEDURE — 83735 ASSAY OF MAGNESIUM: CPT | Performed by: INTERNAL MEDICINE

## 2018-09-07 PROCEDURE — 25010000002 DAPTOMYCIN PER 1 MG: Performed by: PHYSICIAN ASSISTANT

## 2018-09-07 PROCEDURE — 80048 BASIC METABOLIC PNL TOTAL CA: CPT | Performed by: INTERNAL MEDICINE

## 2018-09-07 PROCEDURE — 25010000002 ENOXAPARIN PER 10 MG: Performed by: PODIATRIST

## 2018-09-07 PROCEDURE — 86140 C-REACTIVE PROTEIN: CPT | Performed by: PHYSICIAN ASSISTANT

## 2018-09-07 PROCEDURE — 99239 HOSP IP/OBS DSCHRG MGMT >30: CPT | Performed by: PHYSICIAN ASSISTANT

## 2018-09-07 PROCEDURE — 82962 GLUCOSE BLOOD TEST: CPT

## 2018-09-07 RX ORDER — POVIDONE-IODINE 10 MG/G
OINTMENT TOPICAL EVERY 12 HOURS SCHEDULED
Qty: 28.35 G | Refills: 0 | Status: SHIPPED | OUTPATIENT
Start: 2018-09-07 | End: 2018-09-14

## 2018-09-07 RX ORDER — HYDROCODONE BITARTRATE AND ACETAMINOPHEN 7.5; 325 MG/1; MG/1
1 TABLET ORAL EVERY 4 HOURS PRN
Qty: 18 TABLET | Refills: 0 | Status: SHIPPED | OUTPATIENT
Start: 2018-09-07 | End: 2018-09-10

## 2018-09-07 RX ORDER — HYDROCODONE BITARTRATE AND ACETAMINOPHEN 7.5; 325 MG/1; MG/1
1 TABLET ORAL EVERY 4 HOURS PRN
Qty: 18 TABLET | Refills: 0 | Status: CANCELLED | OUTPATIENT
Start: 2018-09-07 | End: 2018-09-10

## 2018-09-07 RX ORDER — ASPIRIN 81 MG/1
81 TABLET ORAL DAILY
Qty: 30 TABLET | Refills: 0 | Status: SHIPPED | OUTPATIENT
Start: 2018-09-07 | End: 2018-10-07

## 2018-09-07 RX ORDER — HYDROCODONE BITARTRATE AND ACETAMINOPHEN 7.5; 325 MG/1; MG/1
1 TABLET ORAL EVERY 4 HOURS PRN
Qty: 18 TABLET | Refills: 0 | Status: SHIPPED | OUTPATIENT
Start: 2018-09-07 | End: 2018-09-07

## 2018-09-07 RX ORDER — ASPIRIN 81 MG/1
81 TABLET ORAL DAILY
Qty: 30 TABLET | Refills: 0 | Status: SHIPPED | OUTPATIENT
Start: 2018-09-07 | End: 2018-09-07

## 2018-09-07 RX ORDER — POVIDONE-IODINE 10 MG/G
OINTMENT TOPICAL EVERY 12 HOURS SCHEDULED
Qty: 28.35 G | Refills: 0 | Status: SHIPPED | OUTPATIENT
Start: 2018-09-07 | End: 2018-09-07

## 2018-09-07 RX ORDER — MAGNESIUM HYDROXIDE 1200 MG/15ML
LIQUID ORAL
Status: DISCONTINUED
Start: 2018-09-07 | End: 2018-09-07 | Stop reason: HOSPADM

## 2018-09-07 RX ADMIN — FAMOTIDINE 20 MG: 20 TABLET, FILM COATED ORAL at 09:07

## 2018-09-07 RX ADMIN — INSULIN ASPART 5 UNITS: 100 INJECTION, SOLUTION INTRAVENOUS; SUBCUTANEOUS at 11:52

## 2018-09-07 RX ADMIN — INSULIN ASPART 8 UNITS: 100 INJECTION, SOLUTION INTRAVENOUS; SUBCUTANEOUS at 11:52

## 2018-09-07 RX ADMIN — POVIDONE-IODINE: 100 OINTMENT TOPICAL at 10:10

## 2018-09-07 RX ADMIN — Medication 1 CAPSULE: at 09:06

## 2018-09-07 RX ADMIN — DAPTOMYCIN 450 MG: 500 INJECTION, POWDER, LYOPHILIZED, FOR SOLUTION INTRAVENOUS at 14:57

## 2018-09-07 RX ADMIN — ENOXAPARIN SODIUM 40 MG: 40 INJECTION SUBCUTANEOUS at 09:06

## 2018-09-07 RX ADMIN — INSULIN ASPART 5 UNITS: 100 INJECTION, SOLUTION INTRAVENOUS; SUBCUTANEOUS at 09:08

## 2018-09-07 RX ADMIN — IBUPROFEN 800 MG: 800 TABLET ORAL at 07:47

## 2018-09-07 RX ADMIN — Medication 1 TABLET: at 09:06

## 2018-09-07 RX ADMIN — FERROUS SULFATE TAB 325 MG (65 MG ELEMENTAL FE) 325 MG: 325 (65 FE) TAB at 09:07

## 2018-09-07 RX ADMIN — DOCUSATE SODIUM 100 MG: 100 CAPSULE, LIQUID FILLED ORAL at 09:07

## 2018-09-08 ENCOUNTER — READMISSION MANAGEMENT (OUTPATIENT)
Dept: CALL CENTER | Facility: HOSPITAL | Age: 33
End: 2018-09-08

## 2018-09-08 ENCOUNTER — HOSPITAL ENCOUNTER (OUTPATIENT)
Dept: INFUSION THERAPY | Facility: HOSPITAL | Age: 33
Setting detail: INFUSION SERIES
Discharge: HOME | End: 2018-09-08
Attending: INTERNAL MEDICINE

## 2018-09-08 VITALS
HEART RATE: 91 BPM | DIASTOLIC BLOOD PRESSURE: 68 MMHG | SYSTOLIC BLOOD PRESSURE: 112 MMHG | RESPIRATION RATE: 18 BRPM | TEMPERATURE: 98.6 F

## 2018-09-08 DIAGNOSIS — E11.621 DIABETIC FOOT ULCER WITH OSTEOMYELITIS (HCC): ICD-10-CM

## 2018-09-08 DIAGNOSIS — M86.9 DIABETIC FOOT ULCER WITH OSTEOMYELITIS (HCC): ICD-10-CM

## 2018-09-08 DIAGNOSIS — L97.509 DIABETIC FOOT ULCER WITH OSTEOMYELITIS (HCC): ICD-10-CM

## 2018-09-08 DIAGNOSIS — E11.69 DIABETIC FOOT ULCER WITH OSTEOMYELITIS (HCC): ICD-10-CM

## 2018-09-08 LAB
BACTERIA SPEC AEROBE CULT: NORMAL
BACTERIA SPEC AEROBE CULT: NORMAL

## 2018-09-08 PROCEDURE — 96365 THER/PROPH/DIAG IV INF INIT: CPT

## 2018-09-08 PROCEDURE — 25010000002 DAPTOMYCIN PER 1 MG: Performed by: PHYSICIAN ASSISTANT

## 2018-09-08 RX ADMIN — DAPTOMYCIN 450 MG: 500 INJECTION, POWDER, LYOPHILIZED, FOR SOLUTION INTRAVENOUS at 10:58

## 2018-09-08 NOTE — OUTREACH NOTE
Prep Survey      Responses   Facility patient discharged from?  [place]   Is patient eligible?  Yes   Discharge diagnosis  Cellulitis of left foot, existing left lateral malleolus wound, DM II, Left fibula osteomyelitis, left fifth toe osteomyelitis, MRSA infection of skin, Left posterior tibial artery segmental stenosis   Does the patient have one of the following disease processes/diagnoses(primary or secondary)?  General Surgery   Does the patient have Home health ordered?  No   Is there a DME ordered?  Yes   What DME was ordered?  Rolling walker, [facility]   Comments regarding appointments  See AVS   General alerts for this patient  Pt. had excisional debridement of left foot this admission   Prep survey completed?  Yes          Anabelle Rust RN

## 2018-09-08 NOTE — PATIENT INSTRUCTIONS
Daptomycin injection  What is this medicine?  DAPTOMYCIN (DAP toe MYE sin) is a lipopeptide antibiotic. It is used to treat certain kinds of bacterial infections. It will not work for colds, flu, or other viral infections.  This medicine may be used for other purposes; ask your health care provider or pharmacist if you have questions.  COMMON BRAND NAME(S): Zia Lizarraga  What should I tell my health care provider before I take this medicine?  They need to know if you have any of these conditions:  -kidney disease  -an unusual or allergic reaction to daptomycin, other medicines, foods, dyes, or preservatives  -pregnant or trying to get pregnant  -breast-feeding  How should I use this medicine?  This medicine is for infusion into a vein. It is usually given by a health care professional in a hospital or clinic setting.  If you get this medicine at home, you will be taught how to prepare and give this medicine. Use exactly as directed. Take your medicine at regular intervals. Do not take your medicine more often than directed. Take all of your medicine as directed even if you think you are better. Do not skip doses or stop your medicine early.  It is important that you put your used needles and syringes in a special sharps container. Do not put them in a trash can. If you do not have a sharps container, call your pharmacist or healthcare provider to get one.  Talk to your pediatrician regarding the use of this medicine in children. While this drug may be prescribed for children as young as 1 year for selected conditions, precautions do apply.  Overdosage: If you think you have taken too much of this medicine contact a poison control center or emergency room at once.  NOTE: This medicine is only for you. Do not share this medicine with others.  What if I miss a dose?  If you miss a dose, take it as soon as you can. If it is almost time for your next dose, take only that dose. Do not take double or extra  doses.  What may interact with this medicine?  -birth control pills  -some antibiotics like tobramycin  This list may not describe all possible interactions. Give your health care provider a list of all the medicines, herbs, non-prescription drugs, or dietary supplements you use. Also tell them if you smoke, drink alcohol, or use illegal drugs. Some items may interact with your medicine.  What should I watch for while using this medicine?  Your condition will be monitored carefully while you are receiving this medicine.  Do not treat diarrhea with over the counter products. Contact your doctor if you have diarrhea that lasts more than 2 days or if it is severe and watery.  What side effects may I notice from receiving this medicine?  Side effects that you should report to your doctor or health care professional as soon as possible:  -allergic reactions like skin rash, itching or hives, swelling of the face, lips, or tongue  -breathing problems  -fever, infection  -high or low blood pressure  -muscle pain  -numb or tingling pain  -trouble passing urine or change in the amount of urine  -unusually tired or weak  -vomiting  Side effects that usually do not require medical attention (report to your doctor or health care professional if they continue or are bothersome):  -constipation or diarrhea  -trouble sleeping  -headache  -nausea  -stomach upset  This list may not describe all possible side effects. Call your doctor for medical advice about side effects. You may report side effects to FDA at 9-881-FDA-2907.  Where should I keep my medicine?  Keep out of the reach of children.  If you are using this medicine at home, you will be instructed on how to store this medicine. Throw away any unused medicine after the expiration date on the label.  NOTE: This sheet is a summary. It may not cover all possible information. If you have questions about this medicine, talk to your doctor, pharmacist, or health care provider.  ©  2018 [employer]vier/Gold Standard (2017-03-31 11:21:16)

## 2018-09-09 ENCOUNTER — HOSPITAL ENCOUNTER (OUTPATIENT)
Dept: INFUSION THERAPY | Facility: HOSPITAL | Age: 33
Setting detail: INFUSION SERIES
Discharge: HOME | End: 2018-09-09
Attending: INTERNAL MEDICINE

## 2018-09-09 VITALS
RESPIRATION RATE: 18 BRPM | HEART RATE: 87 BPM | DIASTOLIC BLOOD PRESSURE: 69 MMHG | SYSTOLIC BLOOD PRESSURE: 103 MMHG | TEMPERATURE: 98.4 F

## 2018-09-09 DIAGNOSIS — M86.9 DIABETIC FOOT ULCER WITH OSTEOMYELITIS (HCC): ICD-10-CM

## 2018-09-09 DIAGNOSIS — E11.69 DIABETIC FOOT ULCER WITH OSTEOMYELITIS (HCC): ICD-10-CM

## 2018-09-09 DIAGNOSIS — E11.621 DIABETIC FOOT ULCER WITH OSTEOMYELITIS (HCC): ICD-10-CM

## 2018-09-09 DIAGNOSIS — L97.509 DIABETIC FOOT ULCER WITH OSTEOMYELITIS (HCC): ICD-10-CM

## 2018-09-09 PROCEDURE — 96365 THER/PROPH/DIAG IV INF INIT: CPT

## 2018-09-09 PROCEDURE — 25010000002 DAPTOMYCIN PER 1 MG: Performed by: INTERNAL MEDICINE

## 2018-09-09 RX ADMIN — DAPTOMYCIN 450 MG: 500 INJECTION, POWDER, LYOPHILIZED, FOR SOLUTION INTRAVENOUS at 09:36

## 2018-09-10 ENCOUNTER — APPOINTMENT (OUTPATIENT)
Dept: INFUSION THERAPY | Facility: HOSPITAL | Age: 33
End: 2018-09-10
Attending: INTERNAL MEDICINE

## 2018-09-10 ENCOUNTER — TRANSITIONAL CARE MANAGEMENT TELEPHONE ENCOUNTER (OUTPATIENT)
Dept: FAMILY MEDICINE CLINIC | Facility: CLINIC | Age: 33
End: 2018-09-10

## 2018-09-10 ENCOUNTER — HOSPITAL ENCOUNTER (OUTPATIENT)
Dept: INFUSION THERAPY | Facility: HOSPITAL | Age: 33
Setting detail: INFUSION SERIES
Discharge: HOME | End: 2018-09-10
Attending: INTERNAL MEDICINE

## 2018-09-10 VITALS
RESPIRATION RATE: 20 BRPM | TEMPERATURE: 97.9 F | HEART RATE: 76 BPM | SYSTOLIC BLOOD PRESSURE: 152 MMHG | DIASTOLIC BLOOD PRESSURE: 89 MMHG

## 2018-09-10 DIAGNOSIS — L97.509 DIABETIC FOOT ULCER WITH OSTEOMYELITIS (HCC): ICD-10-CM

## 2018-09-10 DIAGNOSIS — M86.9 DIABETIC FOOT ULCER WITH OSTEOMYELITIS (HCC): ICD-10-CM

## 2018-09-10 DIAGNOSIS — E11.69 DIABETIC FOOT ULCER WITH OSTEOMYELITIS (HCC): ICD-10-CM

## 2018-09-10 DIAGNOSIS — E11.621 DIABETIC FOOT ULCER WITH OSTEOMYELITIS (HCC): ICD-10-CM

## 2018-09-10 PROCEDURE — 25010000002 DAPTOMYCIN PER 1 MG: Performed by: INTERNAL MEDICINE

## 2018-09-10 PROCEDURE — 96365 THER/PROPH/DIAG IV INF INIT: CPT

## 2018-09-10 RX ADMIN — DAPTOMYCIN 450 MG: 500 INJECTION, POWDER, LYOPHILIZED, FOR SOLUTION INTRAVENOUS at 16:56

## 2018-09-11 ENCOUNTER — HOSPITAL ENCOUNTER (OUTPATIENT)
Dept: INFUSION THERAPY | Facility: HOSPITAL | Age: 33
Setting detail: INFUSION SERIES
Discharge: HOME | End: 2018-09-11
Attending: INTERNAL MEDICINE

## 2018-09-11 VITALS
HEART RATE: 94 BPM | BODY MASS INDEX: 28.52 KG/M2 | HEIGHT: 65 IN | RESPIRATION RATE: 18 BRPM | DIASTOLIC BLOOD PRESSURE: 84 MMHG | TEMPERATURE: 98.3 F | SYSTOLIC BLOOD PRESSURE: 109 MMHG | WEIGHT: 171.2 LBS

## 2018-09-11 DIAGNOSIS — E11.621 DIABETIC FOOT ULCER WITH OSTEOMYELITIS (HCC): ICD-10-CM

## 2018-09-11 DIAGNOSIS — M86.9 DIABETIC FOOT ULCER WITH OSTEOMYELITIS (HCC): ICD-10-CM

## 2018-09-11 DIAGNOSIS — E11.69 DIABETIC FOOT ULCER WITH OSTEOMYELITIS (HCC): ICD-10-CM

## 2018-09-11 DIAGNOSIS — L97.509 DIABETIC FOOT ULCER WITH OSTEOMYELITIS (HCC): ICD-10-CM

## 2018-09-11 PROCEDURE — 25010000002 DAPTOMYCIN PER 1 MG: Performed by: INTERNAL MEDICINE

## 2018-09-11 PROCEDURE — 96365 THER/PROPH/DIAG IV INF INIT: CPT

## 2018-09-11 RX ADMIN — DAPTOMYCIN 450 MG: 500 INJECTION, POWDER, LYOPHILIZED, FOR SOLUTION INTRAVENOUS at 17:51

## 2018-09-12 ENCOUNTER — APPOINTMENT (OUTPATIENT)
Dept: INFUSION THERAPY | Facility: HOSPITAL | Age: 33
End: 2018-09-12
Attending: INTERNAL MEDICINE

## 2018-09-12 ENCOUNTER — HOSPITAL ENCOUNTER (OUTPATIENT)
Dept: INFUSION THERAPY | Facility: HOSPITAL | Age: 33
Setting detail: INFUSION SERIES
Discharge: HOME | End: 2018-09-12
Attending: INTERNAL MEDICINE

## 2018-09-12 ENCOUNTER — TRANSCRIBE ORDERS (OUTPATIENT)
Dept: INFUSION THERAPY | Facility: HOSPITAL | Age: 33
End: 2018-09-12

## 2018-09-12 ENCOUNTER — HOSPITAL ENCOUNTER (OUTPATIENT)
Dept: INFUSION THERAPY | Facility: HOSPITAL | Age: 33
Discharge: HOME | End: 2018-09-12
Admitting: ORTHOPAEDIC SURGERY

## 2018-09-12 VITALS
RESPIRATION RATE: 20 BRPM | HEART RATE: 88 BPM | SYSTOLIC BLOOD PRESSURE: 114 MMHG | DIASTOLIC BLOOD PRESSURE: 69 MMHG | TEMPERATURE: 98.3 F

## 2018-09-12 DIAGNOSIS — M86.9 DIABETIC FOOT ULCER WITH OSTEOMYELITIS (HCC): ICD-10-CM

## 2018-09-12 DIAGNOSIS — E11.69 DIABETIC FOOT ULCER WITH OSTEOMYELITIS (HCC): ICD-10-CM

## 2018-09-12 DIAGNOSIS — E13.9 DIABETES 1.5, MANAGED AS TYPE 2 (HCC): Primary | ICD-10-CM

## 2018-09-12 DIAGNOSIS — L97.509 DIABETIC FOOT ULCER WITH OSTEOMYELITIS (HCC): ICD-10-CM

## 2018-09-12 DIAGNOSIS — E11.621 DIABETIC FOOT ULCER WITH OSTEOMYELITIS (HCC): ICD-10-CM

## 2018-09-12 LAB — HBA1C MFR BLD: 8.5 % (ref 4.5–5.7)

## 2018-09-12 PROCEDURE — 83036 HEMOGLOBIN GLYCOSYLATED A1C: CPT | Performed by: ORTHOPAEDIC SURGERY

## 2018-09-12 PROCEDURE — 96365 THER/PROPH/DIAG IV INF INIT: CPT

## 2018-09-12 PROCEDURE — 36415 COLL VENOUS BLD VENIPUNCTURE: CPT

## 2018-09-12 PROCEDURE — 25010000002 DAPTOMYCIN PER 1 MG: Performed by: INTERNAL MEDICINE

## 2018-09-12 RX ADMIN — DAPTOMYCIN 450 MG: 500 INJECTION, POWDER, LYOPHILIZED, FOR SOLUTION INTRAVENOUS at 16:03

## 2018-09-13 ENCOUNTER — HOSPITAL ENCOUNTER (OUTPATIENT)
Dept: INFUSION THERAPY | Facility: HOSPITAL | Age: 33
Setting detail: INFUSION SERIES
Discharge: HOME | End: 2018-09-13
Attending: INTERNAL MEDICINE

## 2018-09-13 ENCOUNTER — READMISSION MANAGEMENT (OUTPATIENT)
Dept: CALL CENTER | Facility: HOSPITAL | Age: 33
End: 2018-09-13

## 2018-09-13 VITALS
RESPIRATION RATE: 17 BRPM | HEART RATE: 96 BPM | DIASTOLIC BLOOD PRESSURE: 58 MMHG | TEMPERATURE: 98 F | SYSTOLIC BLOOD PRESSURE: 109 MMHG

## 2018-09-13 DIAGNOSIS — L97.509 DIABETIC FOOT ULCER WITH OSTEOMYELITIS (HCC): ICD-10-CM

## 2018-09-13 DIAGNOSIS — M86.9 DIABETIC FOOT ULCER WITH OSTEOMYELITIS (HCC): ICD-10-CM

## 2018-09-13 DIAGNOSIS — E11.621 DIABETIC FOOT ULCER WITH OSTEOMYELITIS (HCC): ICD-10-CM

## 2018-09-13 DIAGNOSIS — E11.69 DIABETIC FOOT ULCER WITH OSTEOMYELITIS (HCC): ICD-10-CM

## 2018-09-13 PROCEDURE — 96365 THER/PROPH/DIAG IV INF INIT: CPT

## 2018-09-13 PROCEDURE — 25010000002 DAPTOMYCIN PER 1 MG: Performed by: INTERNAL MEDICINE

## 2018-09-13 RX ADMIN — DAPTOMYCIN 450 MG: 500 INJECTION, POWDER, LYOPHILIZED, FOR SOLUTION INTRAVENOUS at 15:18

## 2018-09-13 NOTE — OUTREACH NOTE
General Surgery Week 1 Survey      Responses   Facility patient discharged from?  [facility]   Does the patient have one of the following disease processes/diagnoses(primary or secondary)?  General Surgery   Is there a successful TCM telephone encounter documented?  Yes          Lotus Min RN

## 2018-09-14 ENCOUNTER — APPOINTMENT (OUTPATIENT)
Dept: INFUSION THERAPY | Facility: HOSPITAL | Age: 33
End: 2018-09-14
Attending: INTERNAL MEDICINE

## 2018-09-14 ENCOUNTER — HOSPITAL ENCOUNTER (OUTPATIENT)
Dept: INFUSION THERAPY | Facility: HOSPITAL | Age: 33
Setting detail: INFUSION SERIES
Discharge: HOME | End: 2018-09-14
Attending: INTERNAL MEDICINE

## 2018-09-14 VITALS
SYSTOLIC BLOOD PRESSURE: 124 MMHG | HEART RATE: 92 BPM | TEMPERATURE: 97.9 F | RESPIRATION RATE: 18 BRPM | DIASTOLIC BLOOD PRESSURE: 72 MMHG

## 2018-09-14 DIAGNOSIS — E11.621 DIABETIC FOOT ULCER WITH OSTEOMYELITIS (HCC): ICD-10-CM

## 2018-09-14 DIAGNOSIS — E11.69 DIABETIC FOOT ULCER WITH OSTEOMYELITIS (HCC): ICD-10-CM

## 2018-09-14 DIAGNOSIS — M86.9 DIABETIC FOOT ULCER WITH OSTEOMYELITIS (HCC): ICD-10-CM

## 2018-09-14 DIAGNOSIS — L97.509 DIABETIC FOOT ULCER WITH OSTEOMYELITIS (HCC): ICD-10-CM

## 2018-09-14 LAB — CK SERPL-CCNC: 53 U/L (ref 24–173)

## 2018-09-14 PROCEDURE — 82550 ASSAY OF CK (CPK): CPT | Performed by: INTERNAL MEDICINE

## 2018-09-14 PROCEDURE — 25010000002 DAPTOMYCIN PER 1 MG: Performed by: INTERNAL MEDICINE

## 2018-09-14 PROCEDURE — 96365 THER/PROPH/DIAG IV INF INIT: CPT

## 2018-09-14 RX ADMIN — DAPTOMYCIN 450 MG: 500 INJECTION, POWDER, LYOPHILIZED, FOR SOLUTION INTRAVENOUS at 16:29

## 2018-09-15 ENCOUNTER — HOSPITAL ENCOUNTER (OUTPATIENT)
Dept: INFUSION THERAPY | Facility: HOSPITAL | Age: 33
Setting detail: INFUSION SERIES
Discharge: HOME | End: 2018-09-15
Attending: INTERNAL MEDICINE

## 2018-09-15 VITALS
RESPIRATION RATE: 18 BRPM | HEART RATE: 90 BPM | TEMPERATURE: 97.8 F | SYSTOLIC BLOOD PRESSURE: 99 MMHG | DIASTOLIC BLOOD PRESSURE: 61 MMHG

## 2018-09-15 DIAGNOSIS — L97.509 DIABETIC FOOT ULCER WITH OSTEOMYELITIS (HCC): ICD-10-CM

## 2018-09-15 DIAGNOSIS — E11.621 DIABETIC FOOT ULCER WITH OSTEOMYELITIS (HCC): ICD-10-CM

## 2018-09-15 DIAGNOSIS — E11.69 DIABETIC FOOT ULCER WITH OSTEOMYELITIS (HCC): ICD-10-CM

## 2018-09-15 DIAGNOSIS — M86.9 DIABETIC FOOT ULCER WITH OSTEOMYELITIS (HCC): ICD-10-CM

## 2018-09-15 PROCEDURE — 25010000002 DAPTOMYCIN PER 1 MG: Performed by: INTERNAL MEDICINE

## 2018-09-15 PROCEDURE — 96365 THER/PROPH/DIAG IV INF INIT: CPT

## 2018-09-15 RX ADMIN — DAPTOMYCIN 450 MG: 500 INJECTION, POWDER, LYOPHILIZED, FOR SOLUTION INTRAVENOUS at 10:36

## 2018-09-16 ENCOUNTER — HOSPITAL ENCOUNTER (OUTPATIENT)
Dept: INFUSION THERAPY | Facility: HOSPITAL | Age: 33
Setting detail: INFUSION SERIES
Discharge: HOME | End: 2018-09-16
Attending: INTERNAL MEDICINE

## 2018-09-16 VITALS
SYSTOLIC BLOOD PRESSURE: 150 MMHG | DIASTOLIC BLOOD PRESSURE: 82 MMHG | HEART RATE: 97 BPM | RESPIRATION RATE: 17 BRPM | TEMPERATURE: 98.4 F

## 2018-09-16 DIAGNOSIS — L97.509 DIABETIC FOOT ULCER WITH OSTEOMYELITIS (HCC): ICD-10-CM

## 2018-09-16 DIAGNOSIS — E11.69 DIABETIC FOOT ULCER WITH OSTEOMYELITIS (HCC): ICD-10-CM

## 2018-09-16 DIAGNOSIS — E11.621 DIABETIC FOOT ULCER WITH OSTEOMYELITIS (HCC): ICD-10-CM

## 2018-09-16 DIAGNOSIS — M86.9 DIABETIC FOOT ULCER WITH OSTEOMYELITIS (HCC): ICD-10-CM

## 2018-09-16 PROCEDURE — 96365 THER/PROPH/DIAG IV INF INIT: CPT

## 2018-09-16 PROCEDURE — 25010000002 DAPTOMYCIN PER 1 MG: Performed by: INTERNAL MEDICINE

## 2018-09-16 RX ADMIN — DAPTOMYCIN 450 MG: 500 INJECTION, POWDER, LYOPHILIZED, FOR SOLUTION INTRAVENOUS at 09:14

## 2018-09-17 ENCOUNTER — HOSPITAL ENCOUNTER (OUTPATIENT)
Dept: INFUSION THERAPY | Facility: HOSPITAL | Age: 33
Setting detail: INFUSION SERIES
Discharge: HOME | End: 2018-09-17
Attending: INTERNAL MEDICINE

## 2018-09-17 VITALS
HEART RATE: 92 BPM | TEMPERATURE: 98.3 F | RESPIRATION RATE: 20 BRPM | DIASTOLIC BLOOD PRESSURE: 62 MMHG | SYSTOLIC BLOOD PRESSURE: 107 MMHG

## 2018-09-17 DIAGNOSIS — L97.509 DIABETIC FOOT ULCER WITH OSTEOMYELITIS (HCC): ICD-10-CM

## 2018-09-17 DIAGNOSIS — M86.9 DIABETIC FOOT ULCER WITH OSTEOMYELITIS (HCC): ICD-10-CM

## 2018-09-17 DIAGNOSIS — E11.621 DIABETIC FOOT ULCER WITH OSTEOMYELITIS (HCC): ICD-10-CM

## 2018-09-17 DIAGNOSIS — E11.69 DIABETIC FOOT ULCER WITH OSTEOMYELITIS (HCC): ICD-10-CM

## 2018-09-17 PROCEDURE — 25010000002 DAPTOMYCIN PER 1 MG: Performed by: INTERNAL MEDICINE

## 2018-09-17 PROCEDURE — 96365 THER/PROPH/DIAG IV INF INIT: CPT

## 2018-09-17 RX ADMIN — DAPTOMYCIN 450 MG: 500 INJECTION, POWDER, LYOPHILIZED, FOR SOLUTION INTRAVENOUS at 13:15

## 2018-09-17 NOTE — PATIENT INSTRUCTIONS
Daptomycin injection  What is this medicine?  DAPTOMYCIN (DAP toe MYE sin) is a lipopeptide antibiotic. It is used to treat certain kinds of bacterial infections. It will not work for colds, flu, or other viral infections.  This medicine may be used for other purposes; ask your health care provider or pharmacist if you have questions.  COMMON BRAND NAME(S): Zia Lizarraga  What should I tell my health care provider before I take this medicine?  They need to know if you have any of these conditions:  -kidney disease  -an unusual or allergic reaction to daptomycin, other medicines, foods, dyes, or preservatives  -pregnant or trying to get pregnant  -breast-feeding  How should I use this medicine?  This medicine is for infusion into a vein. It is usually given by a health care professional in a hospital or clinic setting.  If you get this medicine at home, you will be taught how to prepare and give this medicine. Use exactly as directed. Take your medicine at regular intervals. Do not take your medicine more often than directed. Take all of your medicine as directed even if you think you are better. Do not skip doses or stop your medicine early.  It is important that you put your used needles and syringes in a special sharps container. Do not put them in a trash can. If you do not have a sharps container, call your pharmacist or healthcare provider to get one.  Talk to your pediatrician regarding the use of this medicine in children. While this drug may be prescribed for children as young as 1 year for selected conditions, precautions do apply.  Overdosage: If you think you have taken too much of this medicine contact a poison control center or emergency room at once.  NOTE: This medicine is only for you. Do not share this medicine with others.  What if I miss a dose?  If you miss a dose, take it as soon as you can. If it is almost time for your next dose, take only that dose. Do not take double or extra  doses.  What may interact with this medicine?  -birth control pills  -some antibiotics like tobramycin  This list may not describe all possible interactions. Give your health care provider a list of all the medicines, herbs, non-prescription drugs, or dietary supplements you use. Also tell them if you smoke, drink alcohol, or use illegal drugs. Some items may interact with your medicine.  What should I watch for while using this medicine?  Your condition will be monitored carefully while you are receiving this medicine.  Do not treat diarrhea with over the counter products. Contact your doctor if you have diarrhea that lasts more than 2 days or if it is severe and watery.  What side effects may I notice from receiving this medicine?  Side effects that you should report to your doctor or health care professional as soon as possible:  -allergic reactions like skin rash, itching or hives, swelling of the face, lips, or tongue  -breathing problems  -fever, infection  -high or low blood pressure  -muscle pain  -numb or tingling pain  -trouble passing urine or change in the amount of urine  -unusually tired or weak  -vomiting  Side effects that usually do not require medical attention (report to your doctor or health care professional if they continue or are bothersome):  -constipation or diarrhea  -trouble sleeping  -headache  -nausea  -stomach upset  This list may not describe all possible side effects. Call your doctor for medical advice about side effects. You may report side effects to FDA at 3-197-FDA-7209.  Where should I keep my medicine?  Keep out of the reach of children.  If you are using this medicine at home, you will be instructed on how to store this medicine. Throw away any unused medicine after the expiration date on the label.  NOTE: This sheet is a summary. It may not cover all possible information. If you have questions about this medicine, talk to your doctor, pharmacist, or health care provider.  ©  2018 Elsevier/Gold Standard (2017-03-31 11:21:16)

## 2018-09-18 ENCOUNTER — HOSPITAL ENCOUNTER (OUTPATIENT)
Dept: INFUSION THERAPY | Facility: HOSPITAL | Age: 33
Setting detail: INFUSION SERIES
Discharge: HOME | End: 2018-09-18
Attending: INTERNAL MEDICINE

## 2018-09-18 VITALS
RESPIRATION RATE: 17 BRPM | TEMPERATURE: 98 F | SYSTOLIC BLOOD PRESSURE: 109 MMHG | HEART RATE: 94 BPM | DIASTOLIC BLOOD PRESSURE: 76 MMHG

## 2018-09-18 DIAGNOSIS — L97.509 DIABETIC FOOT ULCER WITH OSTEOMYELITIS (HCC): ICD-10-CM

## 2018-09-18 DIAGNOSIS — E11.69 DIABETIC FOOT ULCER WITH OSTEOMYELITIS (HCC): ICD-10-CM

## 2018-09-18 DIAGNOSIS — M86.9 DIABETIC FOOT ULCER WITH OSTEOMYELITIS (HCC): ICD-10-CM

## 2018-09-18 DIAGNOSIS — E11.621 DIABETIC FOOT ULCER WITH OSTEOMYELITIS (HCC): ICD-10-CM

## 2018-09-18 PROCEDURE — 96365 THER/PROPH/DIAG IV INF INIT: CPT

## 2018-09-18 PROCEDURE — 25010000002 DAPTOMYCIN PER 1 MG: Performed by: INTERNAL MEDICINE

## 2018-09-18 RX ADMIN — DAPTOMYCIN 450 MG: 500 INJECTION, POWDER, LYOPHILIZED, FOR SOLUTION INTRAVENOUS at 15:09

## 2018-09-19 ENCOUNTER — READMISSION MANAGEMENT (OUTPATIENT)
Dept: CALL CENTER | Facility: HOSPITAL | Age: 33
End: 2018-09-19

## 2018-09-19 ENCOUNTER — OFFICE VISIT (OUTPATIENT)
Dept: FAMILY MEDICINE CLINIC | Facility: CLINIC | Age: 33
End: 2018-09-19

## 2018-09-19 ENCOUNTER — HOSPITAL ENCOUNTER (OUTPATIENT)
Dept: INFUSION THERAPY | Facility: HOSPITAL | Age: 33
Setting detail: INFUSION SERIES
Discharge: HOME | End: 2018-09-19
Attending: INTERNAL MEDICINE

## 2018-09-19 VITALS
WEIGHT: 173.2 LBS | OXYGEN SATURATION: 100 % | DIASTOLIC BLOOD PRESSURE: 76 MMHG | SYSTOLIC BLOOD PRESSURE: 112 MMHG | HEART RATE: 89 BPM | BODY MASS INDEX: 29.57 KG/M2 | HEIGHT: 64 IN

## 2018-09-19 VITALS
DIASTOLIC BLOOD PRESSURE: 70 MMHG | HEART RATE: 90 BPM | SYSTOLIC BLOOD PRESSURE: 123 MMHG | RESPIRATION RATE: 18 BRPM | TEMPERATURE: 98.9 F

## 2018-09-19 DIAGNOSIS — E11.69 DIABETIC FOOT ULCER WITH OSTEOMYELITIS (HCC): Primary | ICD-10-CM

## 2018-09-19 DIAGNOSIS — E11.621 DIABETIC FOOT ULCER WITH OSTEOMYELITIS (HCC): Primary | ICD-10-CM

## 2018-09-19 DIAGNOSIS — E11.621 DIABETIC FOOT ULCER WITH OSTEOMYELITIS (HCC): ICD-10-CM

## 2018-09-19 DIAGNOSIS — M86.9 DIABETIC FOOT ULCER WITH OSTEOMYELITIS (HCC): Primary | ICD-10-CM

## 2018-09-19 DIAGNOSIS — E11.69 DIABETIC FOOT ULCER WITH OSTEOMYELITIS (HCC): ICD-10-CM

## 2018-09-19 DIAGNOSIS — L97.509 DIABETIC FOOT ULCER WITH OSTEOMYELITIS (HCC): Primary | ICD-10-CM

## 2018-09-19 DIAGNOSIS — M86.9 DIABETIC FOOT ULCER WITH OSTEOMYELITIS (HCC): ICD-10-CM

## 2018-09-19 DIAGNOSIS — L97.509 DIABETIC FOOT ULCER WITH OSTEOMYELITIS (HCC): ICD-10-CM

## 2018-09-19 PROCEDURE — 96365 THER/PROPH/DIAG IV INF INIT: CPT

## 2018-09-19 PROCEDURE — 25010000002 DAPTOMYCIN PER 1 MG: Performed by: INTERNAL MEDICINE

## 2018-09-19 PROCEDURE — 99495 TRANSJ CARE MGMT MOD F2F 14D: CPT | Performed by: NURSE PRACTITIONER

## 2018-09-19 RX ADMIN — DAPTOMYCIN 450 MG: 500 INJECTION, POWDER, LYOPHILIZED, FOR SOLUTION INTRAVENOUS at 13:52

## 2018-09-19 NOTE — OUTREACH NOTE
Medical Week 2 Survey      Responses   Facility patient discharged from?  [facility]   Does the patient have one of the following disease processes/diagnoses(primary or secondary)?  General Surgery   Call start time  0941   General alerts for this patient  Pt. had excisional debridement of left foot this admission   Discharge diagnosis  Cellulitis of left foot, existing left lateral malleolus wound, DM II, Left fibula osteomyelitis, left fifth toe osteomyelitis, MRSA infection of skin, Left posterior tibial artery segmental stenosis   Call end time  0948   Medication alerts for this patient  pt has PICC line for daily antibiotic infusions, Daptomycin   Meds reviewed with patient/caregiver?  Yes   Is the patient having any side effects they believe may be caused by any medication additions or changes?  No   Is the patient taking all medications as directed (includes completed medication regime)?  Yes   Has the patient kept scheduled appointments due by today?  Yes   Comments  pt's foot is covered in Ace wrap and pt is evaluated q 2 wks with surgeons office, pt states her mother is in an ICU in [place], and is going through a stressful time, going back and forth between [place] and [facility] for her daily infusions   Did the patient receive a copy of their discharge instructions?  Yes   Nursing interventions  Reviewed instructions with patient   What is the patient's perception of their health status since discharge?  Improving          Lotus Min RN

## 2018-09-19 NOTE — OUTREACH NOTE
General Surgery Week 2 Survey      Responses   Facility patient discharged from?  [place]   Does the patient have one of the following disease processes/diagnoses(primary or secondary)?  General Surgery   Week 2 attempt successful?  Yes   Call start time  0941   Call end time  0948   General alerts for this patient  Pt. had excisional debridement of left foot this admission   Discharge diagnosis  Cellulitis of left foot, existing left lateral malleolus wound, DM II, Left fibula osteomyelitis, left fifth toe osteomyelitis, MRSA infection of skin, Left posterior tibial artery segmental stenosis   Medication alerts for this patient  pt has PICC line for daily antibiotic infusions, Daptomycin   Meds reviewed with patient/caregiver?  Yes   Is the patient having any side effects they believe may be caused by any medication additions or changes?  No   Does the patient have all medications related to this admission filled (includes all antibiotics, pain medications, etc.)  Yes   Is the patient taking all medications as directed (includes completed medication regime)?  Yes   Does the patient have a follow up appointment scheduled with their surgeon?  Yes   Has the patient kept scheduled appointments due by today?  Yes   Comments  pt's foot is covered in Ace wrap and pt is evaluated q 2 wks with surgeons office, pt states her mother is in an ICU in [place], and is going through a stressful time, going back and forth between [place] and [place] for her daily infusions   Did the patient receive a copy of their discharge instructions?  Yes   Nursing interventions  Reviewed instructions with patient   What is the patient's perception of their health status since discharge?  Improving   Is the patient /caregiver able to teach back basic post-op care?  Keep incision areas clean,dry and protected   Is the patient/caregiver able to teach back signs and symptoms of incisional infection?  Increased redness, swelling or pain at  the incisonal site, Increased drainage or bleeding, Incisional warmth, Pus or odor from incision, Fever   Is the patient/caregiver able to teach back steps to recovery at home?  Set small, achievable goals for return to baseline health, Rest and rebuild strength, gradually increase activity   Week 2 call completed?  Yes          Lotus Min RN

## 2018-09-20 ENCOUNTER — HOSPITAL ENCOUNTER (OUTPATIENT)
Dept: INFUSION THERAPY | Facility: HOSPITAL | Age: 33
Setting detail: INFUSION SERIES
Discharge: HOME | End: 2018-09-20
Attending: INTERNAL MEDICINE

## 2018-09-20 VITALS
DIASTOLIC BLOOD PRESSURE: 53 MMHG | SYSTOLIC BLOOD PRESSURE: 106 MMHG | RESPIRATION RATE: 18 BRPM | TEMPERATURE: 98.6 F | HEART RATE: 91 BPM

## 2018-09-20 DIAGNOSIS — M86.9 DIABETIC FOOT ULCER WITH OSTEOMYELITIS (HCC): ICD-10-CM

## 2018-09-20 DIAGNOSIS — E11.69 DIABETIC FOOT ULCER WITH OSTEOMYELITIS (HCC): ICD-10-CM

## 2018-09-20 DIAGNOSIS — E11.621 DIABETIC FOOT ULCER WITH OSTEOMYELITIS (HCC): ICD-10-CM

## 2018-09-20 DIAGNOSIS — L97.509 DIABETIC FOOT ULCER WITH OSTEOMYELITIS (HCC): ICD-10-CM

## 2018-09-20 PROCEDURE — 25010000002 DAPTOMYCIN PER 1 MG: Performed by: INTERNAL MEDICINE

## 2018-09-20 PROCEDURE — 96365 THER/PROPH/DIAG IV INF INIT: CPT

## 2018-09-20 RX ADMIN — DAPTOMYCIN 450 MG: 500 INJECTION, POWDER, LYOPHILIZED, FOR SOLUTION INTRAVENOUS at 15:24

## 2018-09-21 ENCOUNTER — HOSPITAL ENCOUNTER (OUTPATIENT)
Dept: INFUSION THERAPY | Facility: HOSPITAL | Age: 33
Setting detail: INFUSION SERIES
Discharge: HOME | End: 2018-09-21
Attending: INTERNAL MEDICINE

## 2018-09-21 VITALS
TEMPERATURE: 98.3 F | SYSTOLIC BLOOD PRESSURE: 116 MMHG | DIASTOLIC BLOOD PRESSURE: 62 MMHG | RESPIRATION RATE: 18 BRPM | HEART RATE: 81 BPM

## 2018-09-21 DIAGNOSIS — E11.621 DIABETIC FOOT ULCER WITH OSTEOMYELITIS (HCC): ICD-10-CM

## 2018-09-21 DIAGNOSIS — M86.9 DIABETIC FOOT ULCER WITH OSTEOMYELITIS (HCC): ICD-10-CM

## 2018-09-21 DIAGNOSIS — E11.69 DIABETIC FOOT ULCER WITH OSTEOMYELITIS (HCC): ICD-10-CM

## 2018-09-21 DIAGNOSIS — L97.509 DIABETIC FOOT ULCER WITH OSTEOMYELITIS (HCC): ICD-10-CM

## 2018-09-21 LAB — CK SERPL-CCNC: 70 U/L (ref 24–173)

## 2018-09-21 PROCEDURE — 25010000002 DAPTOMYCIN PER 1 MG: Performed by: INTERNAL MEDICINE

## 2018-09-21 PROCEDURE — 96365 THER/PROPH/DIAG IV INF INIT: CPT

## 2018-09-21 PROCEDURE — 82550 ASSAY OF CK (CPK): CPT | Performed by: INTERNAL MEDICINE

## 2018-09-21 RX ADMIN — DAPTOMYCIN 450 MG: 500 INJECTION, POWDER, LYOPHILIZED, FOR SOLUTION INTRAVENOUS at 11:58

## 2018-09-22 ENCOUNTER — HOSPITAL ENCOUNTER (OUTPATIENT)
Dept: INFUSION THERAPY | Facility: HOSPITAL | Age: 33
Setting detail: INFUSION SERIES
Discharge: HOME | End: 2018-09-22
Attending: INTERNAL MEDICINE

## 2018-09-22 VITALS
SYSTOLIC BLOOD PRESSURE: 114 MMHG | DIASTOLIC BLOOD PRESSURE: 78 MMHG | RESPIRATION RATE: 20 BRPM | HEART RATE: 98 BPM | TEMPERATURE: 97.8 F

## 2018-09-22 DIAGNOSIS — L97.509 DIABETIC FOOT ULCER WITH OSTEOMYELITIS (HCC): ICD-10-CM

## 2018-09-22 DIAGNOSIS — E11.621 DIABETIC FOOT ULCER WITH OSTEOMYELITIS (HCC): ICD-10-CM

## 2018-09-22 DIAGNOSIS — E11.69 DIABETIC FOOT ULCER WITH OSTEOMYELITIS (HCC): ICD-10-CM

## 2018-09-22 DIAGNOSIS — M86.9 DIABETIC FOOT ULCER WITH OSTEOMYELITIS (HCC): ICD-10-CM

## 2018-09-22 PROCEDURE — 96365 THER/PROPH/DIAG IV INF INIT: CPT

## 2018-09-22 PROCEDURE — 25010000002 DAPTOMYCIN PER 1 MG: Performed by: INTERNAL MEDICINE

## 2018-09-22 RX ADMIN — DAPTOMYCIN 450 MG: 500 INJECTION, POWDER, LYOPHILIZED, FOR SOLUTION INTRAVENOUS at 10:36

## 2018-09-24 ENCOUNTER — HOSPITAL ENCOUNTER (OUTPATIENT)
Dept: INFUSION THERAPY | Facility: HOSPITAL | Age: 33
Setting detail: INFUSION SERIES
Discharge: HOME | End: 2018-09-24
Attending: INTERNAL MEDICINE

## 2018-09-24 DIAGNOSIS — M86.9 DIABETIC FOOT ULCER WITH OSTEOMYELITIS (HCC): ICD-10-CM

## 2018-09-24 DIAGNOSIS — E11.69 DIABETIC FOOT ULCER WITH OSTEOMYELITIS (HCC): ICD-10-CM

## 2018-09-24 DIAGNOSIS — L97.509 DIABETIC FOOT ULCER WITH OSTEOMYELITIS (HCC): ICD-10-CM

## 2018-09-24 DIAGNOSIS — E11.621 DIABETIC FOOT ULCER WITH OSTEOMYELITIS (HCC): ICD-10-CM

## 2018-09-24 NOTE — PROGRESS NOTES
Transitional Care Follow Up Visit  Subjective     Cynthia BENITEZ Michoacano is a 33 y.o. female who presents for a transitional care management visit.    Within 48 business hours after discharge our office contacted her via telephone to coordinate her care and needs.      I reviewed and discussed the details of that call along with the discharge summary, hospital problems, inpatient lab results, inpatient diagnostic studies, and consultation reports with Cynthia.     Current outpatient and discharge medications have been reconciled for the patient.    Date of TCM Phone Call 8/16/2018 9/10/2018   [facility]   Discharge Disposition Home or Self Care Home or Self Care     Risk for Readmission (LACE) No Data Recorded    History of Present Illness Presents today follow up after recent admission [facility] on 9/3/18 and discharged on 9/7/18.  Admitted for cellulitis of left lower extremity.       Course During Hospital Stay:  Reviewed discharge summary and copied summary  Following admission, she did receive excisional debridement by Dr. Edmond. She was evaluated by ID with Zosyn discontinued and changed to Rocephin 2g q24 hours. Initial would culture from left lateral foot 5th toe returned with  MRSA and E.faecalis.   IV abx were changed to Vancomycin.  With Vancomycin, C-RP did trend downward.  This was changed by ID to Daptomycin daily at the infusion clinic.  Discussed treatment plan with Mrs. Ríos with preference of coming to the infusion clinic daily.  Discussed wound care, which she reported confidence with performing. Discussed also results of US arterial doppler and further follow-up with Dr. Clark. Consultation was placed with Dr. Clark during hospitalization following us arterial doppler with concern for posterior tibial arterial segmental stenosis. ASA and atorvastatin with outpatient follow-up were recommended. Atorvastatin was not started 2/2 to Dapto therapy and CPK  monitoring. Walker given for 6 months via DME orders 2/2 to osteomyelitis.    Since discharge patient states she has been receiving her IV infusions daily in the outpatient clinic.  Daily dressing changes. Today patient is concerned of new odor and increased pain of 5th digit.  Scheduled next week with Dr Edmond.  Today she denies any fever and states that due to her mothers illness with advanced COPD she is unsure she will be able to continue with her treatments for now as her mother needs her to help with her care.      With previous hospital stay diagnosed with diabetes states she is trying very hard to improve her diet and take her meds as prescribed      The following portions of the patient's history were reviewed and updated as appropriate: allergies, current medications, past family history, past medical history, past social history, past surgical history and problem list.    Review of Systems   Constitutional: Negative for activity change, appetite change, chills, fatigue and fever.        Using waling boot but declines to use walker as prescribed on discharge     HENT: Negative.    Respiratory: Negative.    Cardiovascular: Negative.    Gastrointestinal: Negative.    Genitourinary: Negative.    Musculoskeletal: Negative.    Skin: Positive for color change and wound.   Psychiatric/Behavioral: Negative.    All other systems reviewed and are negative.      Objective   Physical Exam   Constitutional: She is oriented to person, place, and time. She appears well-developed and well-nourished. No distress.   HENT:   Head: Normocephalic.   Cardiovascular: Normal rate, regular rhythm and normal heart sounds.    No murmur heard.  Pulmonary/Chest: Effort normal and breath sounds normal.   Neurological: She is alert and oriented to person, place, and time.   Skin: Skin is warm and dry. She is not diaphoretic.   Left foot 5th metatarsal with maceration odor and drainage.       Psychiatric: She has a normal mood and affect.  Her behavior is normal.   Nursing note and vitals reviewed.      Assessment/Plan   Cynthia was seen today for transitional care management.    Diagnoses and all orders for this visit:    Diabetic foot ulcer with osteomyelitis (CMS/[facility])    Would care with dressing change  Appt with Ortho/podiatry made for follow up   Will continue with outpatient therapy daily IV infusions.

## 2018-09-25 ENCOUNTER — TRANSCRIBE ORDERS (OUTPATIENT)
Dept: INFUSION THERAPY | Facility: HOSPITAL | Age: 33
End: 2018-09-25

## 2018-09-25 ENCOUNTER — HOSPITAL ENCOUNTER (OUTPATIENT)
Dept: INFUSION THERAPY | Facility: HOSPITAL | Age: 33
Setting detail: INFUSION SERIES
Discharge: HOME | End: 2018-09-25
Attending: INTERNAL MEDICINE

## 2018-09-25 ENCOUNTER — HOSPITAL ENCOUNTER (OUTPATIENT)
Dept: INFUSION THERAPY | Facility: HOSPITAL | Age: 33
Discharge: HOME | End: 2018-09-25
Attending: PODIATRIST | Admitting: PODIATRIST

## 2018-09-25 VITALS
HEIGHT: 64 IN | SYSTOLIC BLOOD PRESSURE: 123 MMHG | TEMPERATURE: 98.5 F | RESPIRATION RATE: 20 BRPM | WEIGHT: 172 LBS | DIASTOLIC BLOOD PRESSURE: 82 MMHG | BODY MASS INDEX: 29.37 KG/M2 | HEART RATE: 90 BPM

## 2018-09-25 DIAGNOSIS — E11.69 DIABETIC FOOT ULCER WITH OSTEOMYELITIS (HCC): ICD-10-CM

## 2018-09-25 DIAGNOSIS — L97.509 DIABETIC FOOT ULCER WITH OSTEOMYELITIS (HCC): ICD-10-CM

## 2018-09-25 DIAGNOSIS — M86.9 DIABETIC FOOT ULCER WITH OSTEOMYELITIS (HCC): ICD-10-CM

## 2018-09-25 DIAGNOSIS — L02.619 CELLULITIS AND ABSCESS OF FOOT: Primary | ICD-10-CM

## 2018-09-25 DIAGNOSIS — E11.621 DIABETIC FOOT ULCER WITH OSTEOMYELITIS (HCC): ICD-10-CM

## 2018-09-25 DIAGNOSIS — L03.119 CELLULITIS AND ABSCESS OF FOOT: Primary | ICD-10-CM

## 2018-09-25 LAB
BASOPHILS # BLD AUTO: 0.03 10*3/MM3 (ref 0–0.3)
BASOPHILS NFR BLD AUTO: 0.3 % (ref 0–2)
CRP SERPL-MCNC: 0.99 MG/DL (ref 0–0.99)
DEPRECATED RDW RBC AUTO: 39.5 FL (ref 37–54)
EOSINOPHIL # BLD AUTO: 0.17 10*3/MM3 (ref 0–0.7)
EOSINOPHIL NFR BLD AUTO: 1.9 % (ref 0–5)
ERYTHROCYTE [DISTWIDTH] IN BLOOD BY AUTOMATED COUNT: 12.8 % (ref 11.5–14.5)
ERYTHROCYTE [SEDIMENTATION RATE] IN BLOOD: 24 MM/HR (ref 0–20)
HCT VFR BLD AUTO: 35.2 % (ref 37–47)
HGB BLD-MCNC: 11.9 G/DL (ref 12–16)
IMM GRANULOCYTES # BLD: 0.02 10*3/MM3 (ref 0–0.03)
IMM GRANULOCYTES NFR BLD: 0.2 % (ref 0–0.5)
LYMPHOCYTES # BLD AUTO: 2.77 10*3/MM3 (ref 1–3)
LYMPHOCYTES NFR BLD AUTO: 30.7 % (ref 21–51)
MCH RBC QN AUTO: 29.1 PG (ref 27–33)
MCHC RBC AUTO-ENTMCNC: 33.8 G/DL (ref 33–37)
MCV RBC AUTO: 86.1 FL (ref 80–94)
MONOCYTES # BLD AUTO: 0.56 10*3/MM3 (ref 0.1–0.9)
MONOCYTES NFR BLD AUTO: 6.2 % (ref 0–10)
NEUTROPHILS # BLD AUTO: 5.48 10*3/MM3 (ref 1.4–6.5)
NEUTROPHILS NFR BLD AUTO: 60.7 % (ref 30–70)
PLATELET # BLD AUTO: 350 10*3/MM3 (ref 130–400)
PMV BLD AUTO: 9.3 FL (ref 6–10)
RBC # BLD AUTO: 4.09 10*6/MM3 (ref 4.2–5.4)
WBC NRBC COR # BLD: 9.03 10*3/MM3 (ref 4.5–12.5)

## 2018-09-25 PROCEDURE — 36592 COLLECT BLOOD FROM PICC: CPT

## 2018-09-25 PROCEDURE — 96365 THER/PROPH/DIAG IV INF INIT: CPT

## 2018-09-25 PROCEDURE — 85652 RBC SED RATE AUTOMATED: CPT | Performed by: PODIATRIST

## 2018-09-25 PROCEDURE — 85025 COMPLETE CBC W/AUTO DIFF WBC: CPT | Performed by: PODIATRIST

## 2018-09-25 PROCEDURE — 25010000002 DAPTOMYCIN PER 1 MG: Performed by: INTERNAL MEDICINE

## 2018-09-25 PROCEDURE — 86140 C-REACTIVE PROTEIN: CPT | Performed by: PODIATRIST

## 2018-09-25 RX ADMIN — DAPTOMYCIN 450 MG: 500 INJECTION, POWDER, LYOPHILIZED, FOR SOLUTION INTRAVENOUS at 14:37

## 2018-09-25 NOTE — PATIENT INSTRUCTIONS
Daptomycin injection  What is this medicine?  DAPTOMYCIN (DAP toe MYE sin) is a lipopeptide antibiotic. It is used to treat certain kinds of bacterial infections. It will not work for colds, flu, or other viral infections.  This medicine may be used for other purposes; ask your health care provider or pharmacist if you have questions.  COMMON BRAND NAME(S): Zia Lizarraga  What should I tell my health care provider before I take this medicine?  They need to know if you have any of these conditions:  -kidney disease  -an unusual or allergic reaction to daptomycin, other medicines, foods, dyes, or preservatives  -pregnant or trying to get pregnant  -breast-feeding  How should I use this medicine?  This medicine is for infusion into a vein. It is usually given by a health care professional in a hospital or clinic setting.  If you get this medicine at home, you will be taught how to prepare and give this medicine. Use exactly as directed. Take your medicine at regular intervals. Do not take your medicine more often than directed. Take all of your medicine as directed even if you think you are better. Do not skip doses or stop your medicine early.  It is important that you put your used needles and syringes in a special sharps container. Do not put them in a trash can. If you do not have a sharps container, call your pharmacist or healthcare provider to get one.  Talk to your pediatrician regarding the use of this medicine in children. While this drug may be prescribed for children as young as 1 year for selected conditions, precautions do apply.  Overdosage: If you think you have taken too much of this medicine contact a poison control center or emergency room at once.  NOTE: This medicine is only for you. Do not share this medicine with others.  What if I miss a dose?  If you miss a dose, take it as soon as you can. If it is almost time for your next dose, take only that dose. Do not take double or extra  doses.  What may interact with this medicine?  -birth control pills  -some antibiotics like tobramycin  This list may not describe all possible interactions. Give your health care provider a list of all the medicines, herbs, non-prescription drugs, or dietary supplements you use. Also tell them if you smoke, drink alcohol, or use illegal drugs. Some items may interact with your medicine.  What should I watch for while using this medicine?  Your condition will be monitored carefully while you are receiving this medicine.  Do not treat diarrhea with over the counter products. Contact your doctor if you have diarrhea that lasts more than 2 days or if it is severe and watery.  What side effects may I notice from receiving this medicine?  Side effects that you should report to your doctor or health care professional as soon as possible:  -allergic reactions like skin rash, itching or hives, swelling of the face, lips, or tongue  -breathing problems  -fever, infection  -high or low blood pressure  -muscle pain  -numb or tingling pain  -trouble passing urine or change in the amount of urine  -unusually tired or weak  -vomiting  Side effects that usually do not require medical attention (report to your doctor or health care professional if they continue or are bothersome):  -constipation or diarrhea  -trouble sleeping  -headache  -nausea  -stomach upset  This list may not describe all possible side effects. Call your doctor for medical advice about side effects. You may report side effects to FDA at 5-229-FDA-7687.  Where should I keep my medicine?  Keep out of the reach of children.  If you are using this medicine at home, you will be instructed on how to store this medicine. Throw away any unused medicine after the expiration date on the label.  NOTE: This sheet is a summary. It may not cover all possible information. If you have questions about this medicine, talk to your doctor, pharmacist, or health care provider.  ©  2018 Elsevier/Gold Standard (2017-03-31 11:21:16)

## 2018-09-26 ENCOUNTER — APPOINTMENT (OUTPATIENT)
Dept: INFUSION THERAPY | Facility: HOSPITAL | Age: 33
End: 2018-09-26
Attending: INTERNAL MEDICINE

## 2018-09-26 ENCOUNTER — READMISSION MANAGEMENT (OUTPATIENT)
Dept: CALL CENTER | Facility: HOSPITAL | Age: 33
End: 2018-09-26

## 2018-09-26 NOTE — OUTREACH NOTE
General Surgery Week 3 Survey      Responses   Facility patient discharged from?  [facility]   Does the patient have one of the following disease processes/diagnoses(primary or secondary)?  General Surgery   Week 3 attempt successful?  Yes   Call start time  1406   Call end time  1414   General alerts for this patient  Pt. had excisional debridement of left foot this admission   Discharge diagnosis  Cellulitis of left foot, existing left lateral malleolus wound, DM II, Left fibula osteomyelitis, left fifth toe osteomyelitis, MRSA infection of skin, Left posterior tibial artery segmental stenosis   Is patient permission given to speak with other caregiver?  No   Meds reviewed with patient/caregiver?  Yes   Is the patient having any side effects they believe may be caused by any medication additions or changes?  No   Does the patient have all medications related to this admission filled (includes all antibiotics, pain medications, etc.)  Yes   Is the patient taking all medications as directed (includes completed medication regime)?  Yes   Medication comments  Patient states continues to receive IV antibiotics at infusion center.    Does the patient have a follow up appointment scheduled with their surgeon?  Yes   Has the patient kept scheduled appointments due by today?  Yes   Has home health visited the patient within 72 hours of discharge?  N/A   Did the patient receive a copy of their discharge instructions?  Yes   Nursing interventions  Reviewed instructions with patient   What is the patient's perception of their health status since discharge?  Same [Patient states that she isn't sure if she is improving or not. States that  is scheduling another MRI and blood work to be done. ]   Nursing interventions  Nurse provided patient education   Is the patient /caregiver able to teach back basic post-op care?  No tub bath, swimming, or hot tub until instructed by MD, Keep incision areas clean,dry and protected   Is the  patient/caregiver able to teach back signs and symptoms of incisional infection?  Increased redness, swelling or pain at the incisonal site, Increased drainage or bleeding, Incisional warmth, Pus or odor from incision, Fever   Is the patient/caregiver able to teach back steps to recovery at home?  Rest and rebuild strength, gradually increase activity, Make a list of questions for surgeon's appointment   Is the patient/caregiver able to teach back the hierarchy of who to call/visit for symptoms/problems? PCP, Specialist, Home health nurse, Urgent Care, ED, 911  Yes   Week 3 call completed?  Yes          Anabelle Gallardo RN

## 2018-09-27 ENCOUNTER — APPOINTMENT (OUTPATIENT)
Dept: INFUSION THERAPY | Facility: HOSPITAL | Age: 33
End: 2018-09-27
Attending: INTERNAL MEDICINE

## 2018-09-28 ENCOUNTER — HOSPITAL ENCOUNTER (OUTPATIENT)
Dept: INFUSION THERAPY | Facility: HOSPITAL | Age: 33
Setting detail: INFUSION SERIES
Discharge: HOME | End: 2018-09-28
Attending: INTERNAL MEDICINE

## 2018-09-28 VITALS
HEART RATE: 106 BPM | SYSTOLIC BLOOD PRESSURE: 107 MMHG | DIASTOLIC BLOOD PRESSURE: 62 MMHG | RESPIRATION RATE: 20 BRPM | TEMPERATURE: 98.5 F

## 2018-09-28 DIAGNOSIS — M86.9 DIABETIC FOOT ULCER WITH OSTEOMYELITIS (HCC): ICD-10-CM

## 2018-09-28 DIAGNOSIS — E11.621 DIABETIC FOOT ULCER WITH OSTEOMYELITIS (HCC): ICD-10-CM

## 2018-09-28 DIAGNOSIS — E11.69 DIABETIC FOOT ULCER WITH OSTEOMYELITIS (HCC): ICD-10-CM

## 2018-09-28 DIAGNOSIS — L97.509 DIABETIC FOOT ULCER WITH OSTEOMYELITIS (HCC): ICD-10-CM

## 2018-09-28 LAB — CK SERPL-CCNC: 141 U/L (ref 24–173)

## 2018-09-28 PROCEDURE — 25010000002 DAPTOMYCIN PER 1 MG: Performed by: INTERNAL MEDICINE

## 2018-09-28 PROCEDURE — 96365 THER/PROPH/DIAG IV INF INIT: CPT

## 2018-09-28 PROCEDURE — 82550 ASSAY OF CK (CPK): CPT | Performed by: INTERNAL MEDICINE

## 2018-09-28 RX ADMIN — DAPTOMYCIN 450 MG: 500 INJECTION, POWDER, LYOPHILIZED, FOR SOLUTION INTRAVENOUS at 14:06

## 2018-09-29 ENCOUNTER — APPOINTMENT (OUTPATIENT)
Dept: INFUSION THERAPY | Facility: HOSPITAL | Age: 33
End: 2018-09-29
Attending: INTERNAL MEDICINE

## 2018-09-30 ENCOUNTER — APPOINTMENT (OUTPATIENT)
Dept: INFUSION THERAPY | Facility: HOSPITAL | Age: 33
End: 2018-09-30
Attending: INTERNAL MEDICINE

## 2018-10-01 ENCOUNTER — APPOINTMENT (OUTPATIENT)
Dept: INFUSION THERAPY | Facility: HOSPITAL | Age: 33
End: 2018-10-01
Attending: INTERNAL MEDICINE

## 2018-10-02 ENCOUNTER — OFFICE VISIT (OUTPATIENT)
Dept: FAMILY MEDICINE CLINIC | Facility: CLINIC | Age: 33
End: 2018-10-02

## 2018-10-02 ENCOUNTER — TRANSCRIBE ORDERS (OUTPATIENT)
Dept: ADMINISTRATIVE | Facility: HOSPITAL | Age: 33
End: 2018-10-02

## 2018-10-02 ENCOUNTER — HOSPITAL ENCOUNTER (OUTPATIENT)
Dept: INFUSION THERAPY | Facility: HOSPITAL | Age: 33
Setting detail: INFUSION SERIES
Discharge: HOME | End: 2018-10-02
Attending: INTERNAL MEDICINE

## 2018-10-02 VITALS
HEART RATE: 105 BPM | SYSTOLIC BLOOD PRESSURE: 119 MMHG | DIASTOLIC BLOOD PRESSURE: 69 MMHG | TEMPERATURE: 98.4 F | RESPIRATION RATE: 18 BRPM

## 2018-10-02 VITALS
OXYGEN SATURATION: 97 % | WEIGHT: 174 LBS | BODY MASS INDEX: 29.71 KG/M2 | HEART RATE: 106 BPM | DIASTOLIC BLOOD PRESSURE: 78 MMHG | SYSTOLIC BLOOD PRESSURE: 121 MMHG | HEIGHT: 64 IN

## 2018-10-02 DIAGNOSIS — M86.9 DIABETIC FOOT ULCER WITH OSTEOMYELITIS (HCC): ICD-10-CM

## 2018-10-02 DIAGNOSIS — L97.509 DIABETIC FOOT ULCER WITH OSTEOMYELITIS (HCC): ICD-10-CM

## 2018-10-02 DIAGNOSIS — E11.69 DIABETIC FOOT ULCER WITH OSTEOMYELITIS (HCC): ICD-10-CM

## 2018-10-02 DIAGNOSIS — E11.69 TYPE 2 DIABETES MELLITUS WITH OTHER SPECIFIED COMPLICATION, WITH LONG-TERM CURRENT USE OF INSULIN (HCC): Primary | ICD-10-CM

## 2018-10-02 DIAGNOSIS — E11.621 DIABETIC FOOT ULCER WITH OSTEOMYELITIS (HCC): ICD-10-CM

## 2018-10-02 DIAGNOSIS — M86.072 ACUTE HEMATOGENOUS OSTEOMYELITIS OF LEFT ANKLE (HCC): Primary | ICD-10-CM

## 2018-10-02 DIAGNOSIS — Z79.4 TYPE 2 DIABETES MELLITUS WITH OTHER SPECIFIED COMPLICATION, WITH LONG-TERM CURRENT USE OF INSULIN (HCC): Primary | ICD-10-CM

## 2018-10-02 PROCEDURE — 25010000002 DAPTOMYCIN PER 1 MG: Performed by: INTERNAL MEDICINE

## 2018-10-02 PROCEDURE — 99213 OFFICE O/P EST LOW 20 MIN: CPT | Performed by: NURSE PRACTITIONER

## 2018-10-02 PROCEDURE — 96365 THER/PROPH/DIAG IV INF INIT: CPT

## 2018-10-02 RX ADMIN — DAPTOMYCIN 450 MG: 500 INJECTION, POWDER, LYOPHILIZED, FOR SOLUTION INTRAVENOUS at 12:06

## 2018-10-02 NOTE — PROGRESS NOTES
Subjective   Cynthia Ríos is a 33 y.o. female.     Chief Complaint: Follow-up and Diabetes    Diabetes   She presents for her follow-up diabetic visit. She has type 2 diabetes mellitus. Her disease course has been improving (last A1C down to 8.5). There are no hypoglycemic associated symptoms. Pertinent negatives for diabetes include no chest pain and no foot paresthesias. There are no hypoglycemic complications. (Osteomylitis of left lateral foot and 5th digit; following with juan josé Crocker.  Currently receiving Daptomycin infusions daily at infusion clinic.  Has repeat MRI of foot coming up and then follow up with Dr Edmond for possible amputation) Risk factors for coronary artery disease include diabetes mellitus. She is compliant with treatment all of the time. Her weight is stable. She is following a generally healthy diet. Meal planning includes avoidance of concentrated sweets and carbohydrate counting. She never participates in exercise. Her home blood glucose trend is decreasing steadily. Her breakfast blood glucose range is generally 140-180 mg/dl. An ACE inhibitor/angiotensin II receptor blocker is not being taken.      Pt has PICC line in upper right arm.  No issues with PICC line at this time.       Family History   Problem Relation Age of Onset   • Diabetes Mother    • Heart disease Mother    • COPD Mother    • Atrial fibrillation Mother    • Heart failure Mother    • Liver disease Mother    • Diabetes Father        Social History     Social History   • Marital status: Single     Spouse name: N/A   • Number of children: N/A   • Years of education: N/A     Occupational History   • Not on file.     Social History Main Topics   • Smoking status: Never Smoker   • Smokeless tobacco: Never Used   • Alcohol use Yes      Comment: a couple a month    • Drug use: No   • Sexual activity: Defer     Other Topics Concern   • Not on file     Social History Narrative   • No narrative on file       Past Medical  "History:   Diagnosis Date   • Anemia    • Diabetes mellitus, type 2 (CMS/HCC)    • History of diabetic ulcer of foot    • History of low potassium    • History of seizures    • PCOS (polycystic ovarian syndrome)        Review of Systems   Constitutional: Negative.    HENT: Negative.    Respiratory: Negative.    Cardiovascular: Negative.  Negative for chest pain.   Gastrointestinal: Negative.    Musculoskeletal: Negative.         Left foot pain (follows with ortho)   Skin: Negative.    Neurological: Negative.    Psychiatric/Behavioral: Negative.        Objective   Physical Exam   Constitutional: She is oriented to person, place, and time. She appears well-developed and well-nourished.   Neck: Normal range of motion. Neck supple.   Cardiovascular: Normal rate, regular rhythm and normal heart sounds.    Pulmonary/Chest: Effort normal and breath sounds normal.   Neurological: She is alert and oriented to person, place, and time.   Skin: Skin is warm and dry.   Wrap to left foot and ankle; walking boot; was evaluated by Dr Edmond today   Psychiatric: She has a normal mood and affect. Her behavior is normal. Judgment and thought content normal.   Nursing note and vitals reviewed.      Procedures    Vitals: Blood pressure 121/78, pulse 106, height 162.6 cm (64\"), weight 78.9 kg (174 lb), SpO2 97 %, not currently breastfeeding.    Allergies: No Known Allergies     During this visit the following were done:  Labs Reviewed []    Labs Ordered []    Radiology Reports Reviewed []    Radiology Ordered []    PCP Records Reviewed []    Referring Provider Records Reviewed []    ER Records Reviewed []    Hospital Records Reviewed []    History Obtained From Family []    Radiology Images Reviewed []    Other Reviewed []    Records Requested []      Assessment/Plan   Cynthia was seen today for follow-up and diabetes.    Diagnoses and all orders for this visit:    Type 2 diabetes mellitus with other specified complication, with long-term " current use of insulin (CMS/[facility])  -     Insulin Glargine (TOUJEO SOLOSTAR) 300 UNIT/ML solution pen-injector; Inject 25 Units under the skin into the appropriate area as directed Daily.    Diabetic foot ulcer with osteomyelitis (CMS/[facility])    Will change levemir to toujeo for better A1C control   RTC one month with BS readings

## 2018-10-03 ENCOUNTER — HOSPITAL ENCOUNTER (OUTPATIENT)
Dept: INFUSION THERAPY | Facility: HOSPITAL | Age: 33
Setting detail: INFUSION SERIES
Discharge: HOME | End: 2018-10-03
Attending: INTERNAL MEDICINE

## 2018-10-03 ENCOUNTER — READMISSION MANAGEMENT (OUTPATIENT)
Dept: CALL CENTER | Facility: HOSPITAL | Age: 33
End: 2018-10-03

## 2018-10-03 VITALS
RESPIRATION RATE: 20 BRPM | DIASTOLIC BLOOD PRESSURE: 47 MMHG | BODY MASS INDEX: 29.18 KG/M2 | TEMPERATURE: 98.4 F | HEART RATE: 95 BPM | WEIGHT: 170 LBS | SYSTOLIC BLOOD PRESSURE: 119 MMHG

## 2018-10-03 DIAGNOSIS — M86.9 DIABETIC FOOT ULCER WITH OSTEOMYELITIS (HCC): ICD-10-CM

## 2018-10-03 DIAGNOSIS — E11.69 DIABETIC FOOT ULCER WITH OSTEOMYELITIS (HCC): ICD-10-CM

## 2018-10-03 DIAGNOSIS — L97.509 DIABETIC FOOT ULCER WITH OSTEOMYELITIS (HCC): ICD-10-CM

## 2018-10-03 DIAGNOSIS — E11.621 DIABETIC FOOT ULCER WITH OSTEOMYELITIS (HCC): ICD-10-CM

## 2018-10-03 PROCEDURE — 25010000002 DAPTOMYCIN PER 1 MG: Performed by: INTERNAL MEDICINE

## 2018-10-03 PROCEDURE — 96365 THER/PROPH/DIAG IV INF INIT: CPT

## 2018-10-03 RX ADMIN — DAPTOMYCIN 450 MG: 500 INJECTION, POWDER, LYOPHILIZED, FOR SOLUTION INTRAVENOUS at 14:17

## 2018-10-03 NOTE — PATIENT INSTRUCTIONS
Daptomycin injection  What is this medicine?  DAPTOMYCIN (DAP toe MYE sin) is a lipopeptide antibiotic. It is used to treat certain kinds of bacterial infections. It will not work for colds, flu, or other viral infections.  This medicine may be used for other purposes; ask your health care provider or pharmacist if you have questions.  COMMON BRAND NAME(S): Zia Lizarraga  What should I tell my health care provider before I take this medicine?  They need to know if you have any of these conditions:  -kidney disease  -an unusual or allergic reaction to daptomycin, other medicines, foods, dyes, or preservatives  -pregnant or trying to get pregnant  -breast-feeding  How should I use this medicine?  This medicine is for infusion into a vein. It is usually given by a health care professional in a hospital or clinic setting.  If you get this medicine at home, you will be taught how to prepare and give this medicine. Use exactly as directed. Take your medicine at regular intervals. Do not take your medicine more often than directed. Take all of your medicine as directed even if you think you are better. Do not skip doses or stop your medicine early.  It is important that you put your used needles and syringes in a special sharps container. Do not put them in a trash can. If you do not have a sharps container, call your pharmacist or healthcare provider to get one.  Talk to your pediatrician regarding the use of this medicine in children. While this drug may be prescribed for children as young as 1 year for selected conditions, precautions do apply.  Overdosage: If you think you have taken too much of this medicine contact a poison control center or emergency room at once.  NOTE: This medicine is only for you. Do not share this medicine with others.  What if I miss a dose?  If you miss a dose, take it as soon as you can. If it is almost time for your next dose, take only that dose. Do not take double or extra  doses.  What may interact with this medicine?  -birth control pills  -some antibiotics like tobramycin  This list may not describe all possible interactions. Give your health care provider a list of all the medicines, herbs, non-prescription drugs, or dietary supplements you use. Also tell them if you smoke, drink alcohol, or use illegal drugs. Some items may interact with your medicine.  What should I watch for while using this medicine?  Your condition will be monitored carefully while you are receiving this medicine.  Do not treat diarrhea with over the counter products. Contact your doctor if you have diarrhea that lasts more than 2 days or if it is severe and watery.  What side effects may I notice from receiving this medicine?  Side effects that you should report to your doctor or health care professional as soon as possible:  -allergic reactions like skin rash, itching or hives, swelling of the face, lips, or tongue  -breathing problems  -fever, infection  -high or low blood pressure  -muscle pain  -numb or tingling pain  -trouble passing urine or change in the amount of urine  -unusually tired or weak  -vomiting  Side effects that usually do not require medical attention (report to your doctor or health care professional if they continue or are bothersome):  -constipation or diarrhea  -trouble sleeping  -headache  -nausea  -stomach upset  This list may not describe all possible side effects. Call your doctor for medical advice about side effects. You may report side effects to FDA at 3-277-FDA-4423.  Where should I keep my medicine?  Keep out of the reach of children.  If you are using this medicine at home, you will be instructed on how to store this medicine. Throw away any unused medicine after the expiration date on the label.  NOTE: This sheet is a summary. It may not cover all possible information. If you have questions about this medicine, talk to your doctor, pharmacist, or health care provider.  ©  2018 [employer]vier/Gold Standard (2017-03-31 11:21:16)

## 2018-10-03 NOTE — OUTREACH NOTE
General Surgery Week 4 Survey      Responses   Facility patient discharged from?  [facility]   Does the patient have one of the following disease processes/diagnoses(primary or secondary)?  General Surgery   Week 4 attempt successful?  No          Angela Reynaga RN

## 2018-10-04 ENCOUNTER — HOSPITAL ENCOUNTER (OUTPATIENT)
Dept: MRI IMAGING | Facility: HOSPITAL | Age: 33
Discharge: HOME | End: 2018-10-04
Attending: PODIATRIST | Admitting: PODIATRIST

## 2018-10-04 ENCOUNTER — HOSPITAL ENCOUNTER (OUTPATIENT)
Dept: INFUSION THERAPY | Facility: HOSPITAL | Age: 33
Setting detail: INFUSION SERIES
Discharge: HOME | End: 2018-10-04
Attending: INTERNAL MEDICINE

## 2018-10-04 VITALS
HEART RATE: 93 BPM | SYSTOLIC BLOOD PRESSURE: 121 MMHG | TEMPERATURE: 98.8 F | RESPIRATION RATE: 18 BRPM | DIASTOLIC BLOOD PRESSURE: 78 MMHG

## 2018-10-04 DIAGNOSIS — M86.9 DIABETIC FOOT ULCER WITH OSTEOMYELITIS (HCC): ICD-10-CM

## 2018-10-04 DIAGNOSIS — L97.509 DIABETIC FOOT ULCER WITH OSTEOMYELITIS (HCC): ICD-10-CM

## 2018-10-04 DIAGNOSIS — E11.69 DIABETIC FOOT ULCER WITH OSTEOMYELITIS (HCC): ICD-10-CM

## 2018-10-04 DIAGNOSIS — M86.072 ACUTE HEMATOGENOUS OSTEOMYELITIS OF LEFT ANKLE (HCC): ICD-10-CM

## 2018-10-04 DIAGNOSIS — E11.621 DIABETIC FOOT ULCER WITH OSTEOMYELITIS (HCC): ICD-10-CM

## 2018-10-04 PROCEDURE — 96365 THER/PROPH/DIAG IV INF INIT: CPT

## 2018-10-04 PROCEDURE — 25010000002 DAPTOMYCIN PER 1 MG: Performed by: INTERNAL MEDICINE

## 2018-10-04 PROCEDURE — 73718 MRI LOWER EXTREMITY W/O DYE: CPT

## 2018-10-04 PROCEDURE — 73718 MRI LOWER EXTREMITY W/O DYE: CPT | Performed by: RADIOLOGY

## 2018-10-04 RX ADMIN — DAPTOMYCIN 450 MG: 500 INJECTION, POWDER, LYOPHILIZED, FOR SOLUTION INTRAVENOUS at 15:47

## 2018-10-05 ENCOUNTER — HOSPITAL ENCOUNTER (OUTPATIENT)
Dept: INFUSION THERAPY | Facility: HOSPITAL | Age: 33
Setting detail: INFUSION SERIES
Discharge: HOME | End: 2018-10-05
Attending: INTERNAL MEDICINE

## 2018-10-05 ENCOUNTER — TELEPHONE (OUTPATIENT)
Dept: FAMILY MEDICINE CLINIC | Facility: CLINIC | Age: 33
End: 2018-10-05

## 2018-10-05 VITALS
RESPIRATION RATE: 20 BRPM | HEART RATE: 108 BPM | SYSTOLIC BLOOD PRESSURE: 115 MMHG | TEMPERATURE: 98.4 F | DIASTOLIC BLOOD PRESSURE: 69 MMHG

## 2018-10-05 DIAGNOSIS — E11.69 DIABETIC FOOT ULCER WITH OSTEOMYELITIS (HCC): ICD-10-CM

## 2018-10-05 DIAGNOSIS — E11.621 DIABETIC FOOT ULCER WITH OSTEOMYELITIS (HCC): ICD-10-CM

## 2018-10-05 DIAGNOSIS — L97.509 DIABETIC FOOT ULCER WITH OSTEOMYELITIS (HCC): ICD-10-CM

## 2018-10-05 DIAGNOSIS — M86.9 DIABETIC FOOT ULCER WITH OSTEOMYELITIS (HCC): ICD-10-CM

## 2018-10-05 LAB — CK SERPL-CCNC: 57 U/L (ref 24–173)

## 2018-10-05 PROCEDURE — 36592 COLLECT BLOOD FROM PICC: CPT

## 2018-10-05 PROCEDURE — 82550 ASSAY OF CK (CPK): CPT | Performed by: INTERNAL MEDICINE

## 2018-10-05 PROCEDURE — 25010000002 DAPTOMYCIN PER 1 MG: Performed by: INTERNAL MEDICINE

## 2018-10-05 PROCEDURE — 96365 THER/PROPH/DIAG IV INF INIT: CPT

## 2018-10-05 RX ORDER — LANCETS
EACH MISCELLANEOUS
Qty: 100 EACH | Refills: 11 | Status: SHIPPED | OUTPATIENT
Start: 2018-10-05 | End: 2019-01-30 | Stop reason: SDUPTHER

## 2018-10-05 RX ADMIN — DAPTOMYCIN 450 MG: 500 INJECTION, POWDER, LYOPHILIZED, FOR SOLUTION INTRAVENOUS at 09:36

## 2018-10-08 ENCOUNTER — APPOINTMENT (OUTPATIENT)
Dept: INFUSION THERAPY | Facility: HOSPITAL | Age: 33
End: 2018-10-08
Attending: INTERNAL MEDICINE

## 2018-10-09 ENCOUNTER — HOSPITAL ENCOUNTER (OUTPATIENT)
Dept: INFUSION THERAPY | Facility: HOSPITAL | Age: 33
Setting detail: INFUSION SERIES
Discharge: HOME | End: 2018-10-09
Attending: INTERNAL MEDICINE

## 2018-10-09 VITALS
TEMPERATURE: 98.3 F | HEART RATE: 93 BPM | DIASTOLIC BLOOD PRESSURE: 85 MMHG | SYSTOLIC BLOOD PRESSURE: 144 MMHG | RESPIRATION RATE: 18 BRPM

## 2018-10-09 DIAGNOSIS — M86.9 DIABETIC FOOT ULCER WITH OSTEOMYELITIS (HCC): ICD-10-CM

## 2018-10-09 DIAGNOSIS — E11.621 DIABETIC FOOT ULCER WITH OSTEOMYELITIS (HCC): ICD-10-CM

## 2018-10-09 DIAGNOSIS — E11.69 DIABETIC FOOT ULCER WITH OSTEOMYELITIS (HCC): ICD-10-CM

## 2018-10-09 DIAGNOSIS — L97.509 DIABETIC FOOT ULCER WITH OSTEOMYELITIS (HCC): ICD-10-CM

## 2018-10-09 PROCEDURE — 96365 THER/PROPH/DIAG IV INF INIT: CPT

## 2018-10-09 PROCEDURE — 25010000002 DAPTOMYCIN PER 1 MG: Performed by: INTERNAL MEDICINE

## 2018-10-09 RX ADMIN — DAPTOMYCIN 450 MG: 500 INJECTION, POWDER, LYOPHILIZED, FOR SOLUTION INTRAVENOUS at 15:00

## 2018-10-10 ENCOUNTER — HOSPITAL ENCOUNTER (OUTPATIENT)
Dept: INFUSION THERAPY | Facility: HOSPITAL | Age: 33
Setting detail: INFUSION SERIES
Discharge: HOME | End: 2018-10-10
Attending: INTERNAL MEDICINE

## 2018-10-10 VITALS
RESPIRATION RATE: 18 BRPM | SYSTOLIC BLOOD PRESSURE: 120 MMHG | TEMPERATURE: 98.2 F | HEART RATE: 74 BPM | DIASTOLIC BLOOD PRESSURE: 74 MMHG

## 2018-10-10 DIAGNOSIS — E11.621 DIABETIC FOOT ULCER WITH OSTEOMYELITIS (HCC): ICD-10-CM

## 2018-10-10 DIAGNOSIS — M86.9 DIABETIC FOOT ULCER WITH OSTEOMYELITIS (HCC): ICD-10-CM

## 2018-10-10 DIAGNOSIS — E11.69 DIABETIC FOOT ULCER WITH OSTEOMYELITIS (HCC): ICD-10-CM

## 2018-10-10 DIAGNOSIS — L97.509 DIABETIC FOOT ULCER WITH OSTEOMYELITIS (HCC): ICD-10-CM

## 2018-10-10 PROCEDURE — 96365 THER/PROPH/DIAG IV INF INIT: CPT

## 2018-10-10 PROCEDURE — 25010000002 DAPTOMYCIN PER 1 MG: Performed by: INTERNAL MEDICINE

## 2018-10-10 RX ADMIN — DAPTOMYCIN 450 MG: 500 INJECTION, POWDER, LYOPHILIZED, FOR SOLUTION INTRAVENOUS at 14:37

## 2018-10-10 NOTE — CODE DOCUMENTATION
Ceci at Dr. Edmond's office notified of pt missing several appointments and her order states outpatient Daptomycin infusion x 39 doses and pt has only taken 22 of the scheduled doses.  Ceci voiced clarification with Dr. Edmond for pt to continue to receive infusions for a total of 39 doses, even if she has to miss days due to her mother's illness.

## 2018-10-11 ENCOUNTER — HOSPITAL ENCOUNTER (OUTPATIENT)
Dept: INFUSION THERAPY | Facility: HOSPITAL | Age: 33
Setting detail: INFUSION SERIES
Discharge: HOME | End: 2018-10-11
Attending: INTERNAL MEDICINE

## 2018-10-11 VITALS
DIASTOLIC BLOOD PRESSURE: 62 MMHG | RESPIRATION RATE: 20 BRPM | WEIGHT: 170 LBS | HEART RATE: 88 BPM | HEIGHT: 65 IN | OXYGEN SATURATION: 99 % | BODY MASS INDEX: 28.32 KG/M2 | SYSTOLIC BLOOD PRESSURE: 128 MMHG | TEMPERATURE: 98.3 F

## 2018-10-11 DIAGNOSIS — L97.509 DIABETIC FOOT ULCER WITH OSTEOMYELITIS (HCC): ICD-10-CM

## 2018-10-11 DIAGNOSIS — M86.9 DIABETIC FOOT ULCER WITH OSTEOMYELITIS (HCC): ICD-10-CM

## 2018-10-11 DIAGNOSIS — E11.621 DIABETIC FOOT ULCER WITH OSTEOMYELITIS (HCC): ICD-10-CM

## 2018-10-11 DIAGNOSIS — E11.69 DIABETIC FOOT ULCER WITH OSTEOMYELITIS (HCC): ICD-10-CM

## 2018-10-11 PROCEDURE — 96365 THER/PROPH/DIAG IV INF INIT: CPT

## 2018-10-11 PROCEDURE — 25010000002 DAPTOMYCIN PER 1 MG: Performed by: INTERNAL MEDICINE

## 2018-10-11 RX ADMIN — DAPTOMYCIN 450 MG: 500 INJECTION, POWDER, LYOPHILIZED, FOR SOLUTION INTRAVENOUS at 12:17

## 2018-10-11 NOTE — PATIENT INSTRUCTIONS
Daptomycin injection  What is this medicine?  DAPTOMYCIN (DAP toe MYE sin) is a lipopeptide antibiotic. It is used to treat certain kinds of bacterial infections. It will not work for colds, flu, or other viral infections.  This medicine may be used for other purposes; ask your health care provider or pharmacist if you have questions.  COMMON BRAND NAME(S): Zia Lizarraga  What should I tell my health care provider before I take this medicine?  They need to know if you have any of these conditions:  -kidney disease  -an unusual or allergic reaction to daptomycin, other medicines, foods, dyes, or preservatives  -pregnant or trying to get pregnant  -breast-feeding  How should I use this medicine?  This medicine is for infusion into a vein. It is usually given by a health care professional in a hospital or clinic setting.  If you get this medicine at home, you will be taught how to prepare and give this medicine. Use exactly as directed. Take your medicine at regular intervals. Do not take your medicine more often than directed. Take all of your medicine as directed even if you think you are better. Do not skip doses or stop your medicine early.  It is important that you put your used needles and syringes in a special sharps container. Do not put them in a trash can. If you do not have a sharps container, call your pharmacist or healthcare provider to get one.  Talk to your pediatrician regarding the use of this medicine in children. While this drug may be prescribed for children as young as 1 year for selected conditions, precautions do apply.  Overdosage: If you think you have taken too much of this medicine contact a poison control center or emergency room at once.  NOTE: This medicine is only for you. Do not share this medicine with others.  What if I miss a dose?  If you miss a dose, take it as soon as you can. If it is almost time for your next dose, take only that dose. Do not take double or extra  doses.  What may interact with this medicine?  -birth control pills  -some antibiotics like tobramycin  This list may not describe all possible interactions. Give your health care provider a list of all the medicines, herbs, non-prescription drugs, or dietary supplements you use. Also tell them if you smoke, drink alcohol, or use illegal drugs. Some items may interact with your medicine.  What should I watch for while using this medicine?  Your condition will be monitored carefully while you are receiving this medicine.  Do not treat diarrhea with over the counter products. Contact your doctor if you have diarrhea that lasts more than 2 days or if it is severe and watery.  What side effects may I notice from receiving this medicine?  Side effects that you should report to your doctor or health care professional as soon as possible:  -allergic reactions like skin rash, itching or hives, swelling of the face, lips, or tongue  -breathing problems  -fever, infection  -high or low blood pressure  -muscle pain  -numb or tingling pain  -trouble passing urine or change in the amount of urine  -unusually tired or weak  -vomiting  Side effects that usually do not require medical attention (report to your doctor or health care professional if they continue or are bothersome):  -constipation or diarrhea  -trouble sleeping  -headache  -nausea  -stomach upset  This list may not describe all possible side effects. Call your doctor for medical advice about side effects. You may report side effects to FDA at 8-413-FDA-3472.  Where should I keep my medicine?  Keep out of the reach of children.  If you are using this medicine at home, you will be instructed on how to store this medicine. Throw away any unused medicine after the expiration date on the label.  NOTE: This sheet is a summary. It may not cover all possible information. If you have questions about this medicine, talk to your doctor, pharmacist, or health care provider.  ©  2018 Elsevier/Gold Standard (2017-03-31 11:21:16)

## 2018-10-12 ENCOUNTER — TRANSCRIBE ORDERS (OUTPATIENT)
Dept: INFUSION THERAPY | Facility: HOSPITAL | Age: 33
End: 2018-10-12

## 2018-10-12 ENCOUNTER — HOSPITAL ENCOUNTER (OUTPATIENT)
Dept: INFUSION THERAPY | Facility: HOSPITAL | Age: 33
Setting detail: INFUSION SERIES
Discharge: HOME | End: 2018-10-12
Attending: INTERNAL MEDICINE

## 2018-10-12 ENCOUNTER — HOSPITAL ENCOUNTER (OUTPATIENT)
Dept: INFUSION THERAPY | Facility: HOSPITAL | Age: 33
Setting detail: INFUSION SERIES
Discharge: HOME | End: 2018-10-12
Attending: PODIATRIST

## 2018-10-12 VITALS
HEART RATE: 102 BPM | TEMPERATURE: 98.4 F | SYSTOLIC BLOOD PRESSURE: 119 MMHG | DIASTOLIC BLOOD PRESSURE: 73 MMHG | RESPIRATION RATE: 18 BRPM

## 2018-10-12 DIAGNOSIS — L97.509 DIABETIC FOOT ULCER WITH OSTEOMYELITIS (HCC): ICD-10-CM

## 2018-10-12 DIAGNOSIS — M00.9 JOINT INFECTION (HCC): ICD-10-CM

## 2018-10-12 DIAGNOSIS — M86.9 BONE INFECTION (HCC): Primary | ICD-10-CM

## 2018-10-12 DIAGNOSIS — M86.9 BONE INFECTION (HCC): ICD-10-CM

## 2018-10-12 DIAGNOSIS — E11.621 DIABETIC FOOT ULCER WITH OSTEOMYELITIS (HCC): ICD-10-CM

## 2018-10-12 DIAGNOSIS — M86.9 DIABETIC FOOT ULCER WITH OSTEOMYELITIS (HCC): ICD-10-CM

## 2018-10-12 DIAGNOSIS — E11.69 DIABETIC FOOT ULCER WITH OSTEOMYELITIS (HCC): ICD-10-CM

## 2018-10-12 DIAGNOSIS — M00.9 JOINT INFECTION (HCC): Primary | ICD-10-CM

## 2018-10-12 LAB — CK SERPL-CCNC: 76 U/L (ref 24–173)

## 2018-10-12 PROCEDURE — 25010000002 DAPTOMYCIN PER 1 MG: Performed by: INTERNAL MEDICINE

## 2018-10-12 PROCEDURE — 82550 ASSAY OF CK (CPK): CPT | Performed by: INTERNAL MEDICINE

## 2018-10-12 PROCEDURE — 96365 THER/PROPH/DIAG IV INF INIT: CPT

## 2018-10-12 RX ADMIN — DAPTOMYCIN 450 MG: 500 INJECTION, POWDER, LYOPHILIZED, FOR SOLUTION INTRAVENOUS at 09:21

## 2018-10-15 ENCOUNTER — HOSPITAL ENCOUNTER (OUTPATIENT)
Dept: INFUSION THERAPY | Facility: HOSPITAL | Age: 33
Setting detail: INFUSION SERIES
Discharge: HOME | End: 2018-10-15
Attending: INTERNAL MEDICINE

## 2018-10-15 ENCOUNTER — APPOINTMENT (OUTPATIENT)
Dept: INFUSION THERAPY | Facility: HOSPITAL | Age: 33
End: 2018-10-15
Attending: INTERNAL MEDICINE

## 2018-10-15 VITALS
RESPIRATION RATE: 16 BRPM | DIASTOLIC BLOOD PRESSURE: 73 MMHG | HEART RATE: 98 BPM | SYSTOLIC BLOOD PRESSURE: 121 MMHG | TEMPERATURE: 98.6 F

## 2018-10-15 DIAGNOSIS — L97.509 DIABETIC FOOT ULCER WITH OSTEOMYELITIS (HCC): ICD-10-CM

## 2018-10-15 DIAGNOSIS — E11.621 DIABETIC FOOT ULCER WITH OSTEOMYELITIS (HCC): ICD-10-CM

## 2018-10-15 DIAGNOSIS — M86.9 DIABETIC FOOT ULCER WITH OSTEOMYELITIS (HCC): ICD-10-CM

## 2018-10-15 DIAGNOSIS — E11.69 DIABETIC FOOT ULCER WITH OSTEOMYELITIS (HCC): ICD-10-CM

## 2018-10-15 PROCEDURE — 25010000002 DAPTOMYCIN PER 1 MG: Performed by: INTERNAL MEDICINE

## 2018-10-15 PROCEDURE — 96365 THER/PROPH/DIAG IV INF INIT: CPT

## 2018-10-15 RX ADMIN — DAPTOMYCIN 450 MG: 500 INJECTION, POWDER, LYOPHILIZED, FOR SOLUTION INTRAVENOUS at 13:53

## 2018-10-16 ENCOUNTER — HOSPITAL ENCOUNTER (OUTPATIENT)
Dept: INFUSION THERAPY | Facility: HOSPITAL | Age: 33
Setting detail: INFUSION SERIES
Discharge: HOME | End: 2018-10-16
Attending: INTERNAL MEDICINE

## 2018-10-16 DIAGNOSIS — E11.621 DIABETIC FOOT ULCER WITH OSTEOMYELITIS (HCC): ICD-10-CM

## 2018-10-16 DIAGNOSIS — L97.509 DIABETIC FOOT ULCER WITH OSTEOMYELITIS (HCC): ICD-10-CM

## 2018-10-16 DIAGNOSIS — M86.9 DIABETIC FOOT ULCER WITH OSTEOMYELITIS (HCC): ICD-10-CM

## 2018-10-16 DIAGNOSIS — E11.69 DIABETIC FOOT ULCER WITH OSTEOMYELITIS (HCC): ICD-10-CM

## 2018-10-17 ENCOUNTER — HOSPITAL ENCOUNTER (OUTPATIENT)
Dept: INFUSION THERAPY | Facility: HOSPITAL | Age: 33
Setting detail: INFUSION SERIES
Discharge: HOME | End: 2018-10-17
Attending: INTERNAL MEDICINE

## 2018-10-17 ENCOUNTER — HOSPITAL ENCOUNTER (OUTPATIENT)
Dept: INFUSION THERAPY | Facility: HOSPITAL | Age: 33
Setting detail: INFUSION SERIES
Discharge: HOME | End: 2018-10-17
Attending: PODIATRIST

## 2018-10-17 VITALS
DIASTOLIC BLOOD PRESSURE: 69 MMHG | SYSTOLIC BLOOD PRESSURE: 113 MMHG | HEART RATE: 97 BPM | RESPIRATION RATE: 20 BRPM | TEMPERATURE: 98.3 F

## 2018-10-17 DIAGNOSIS — M86.9 DIABETIC FOOT ULCER WITH OSTEOMYELITIS (HCC): ICD-10-CM

## 2018-10-17 DIAGNOSIS — L97.509 DIABETIC FOOT ULCER WITH OSTEOMYELITIS (HCC): ICD-10-CM

## 2018-10-17 DIAGNOSIS — E11.69 DIABETIC FOOT ULCER WITH OSTEOMYELITIS (HCC): ICD-10-CM

## 2018-10-17 DIAGNOSIS — E11.621 DIABETIC FOOT ULCER WITH OSTEOMYELITIS (HCC): ICD-10-CM

## 2018-10-17 PROCEDURE — 96365 THER/PROPH/DIAG IV INF INIT: CPT

## 2018-10-17 PROCEDURE — 25010000002 DAPTOMYCIN PER 1 MG: Performed by: INTERNAL MEDICINE

## 2018-10-17 RX ADMIN — DAPTOMYCIN 450 MG: 500 INJECTION, POWDER, LYOPHILIZED, FOR SOLUTION INTRAVENOUS at 15:15

## 2018-10-17 NOTE — CODE DOCUMENTATION
Pt verbalizes that Dr. Edmond is admitting her to [facility] tomorrow & perform surgery on her foot on Friday.

## 2018-10-18 ENCOUNTER — APPOINTMENT (OUTPATIENT)
Dept: INFUSION THERAPY | Facility: HOSPITAL | Age: 33
End: 2018-10-18
Attending: INTERNAL MEDICINE

## 2018-10-19 ENCOUNTER — APPOINTMENT (OUTPATIENT)
Dept: INFUSION THERAPY | Facility: HOSPITAL | Age: 33
End: 2018-10-19
Attending: INTERNAL MEDICINE

## 2018-10-22 ENCOUNTER — TRANSCRIBE ORDERS (OUTPATIENT)
Dept: INFUSION THERAPY | Facility: HOSPITAL | Age: 33
End: 2018-10-22

## 2018-10-22 ENCOUNTER — APPOINTMENT (OUTPATIENT)
Dept: INFUSION THERAPY | Facility: HOSPITAL | Age: 33
End: 2018-10-22
Attending: INTERNAL MEDICINE

## 2018-10-22 DIAGNOSIS — Z22.322 MRSA (METHICILLIN RESISTANT STAPH AUREUS) CULTURE POSITIVE: Primary | ICD-10-CM

## 2018-10-23 ENCOUNTER — HOSPITAL ENCOUNTER (OUTPATIENT)
Dept: INFUSION THERAPY | Facility: HOSPITAL | Age: 33
Setting detail: INFUSION SERIES
Discharge: HOME | End: 2018-10-23
Attending: PODIATRIST

## 2018-10-23 ENCOUNTER — APPOINTMENT (OUTPATIENT)
Dept: INFUSION THERAPY | Facility: HOSPITAL | Age: 33
End: 2018-10-23
Attending: INTERNAL MEDICINE

## 2018-10-23 ENCOUNTER — TRANSITIONAL CARE MANAGEMENT TELEPHONE ENCOUNTER (OUTPATIENT)
Dept: FAMILY MEDICINE CLINIC | Facility: CLINIC | Age: 33
End: 2018-10-23

## 2018-10-23 VITALS
RESPIRATION RATE: 17 BRPM | SYSTOLIC BLOOD PRESSURE: 130 MMHG | DIASTOLIC BLOOD PRESSURE: 84 MMHG | TEMPERATURE: 97.7 F | HEART RATE: 86 BPM

## 2018-10-23 DIAGNOSIS — L97.509 DIABETIC FOOT ULCER WITH OSTEOMYELITIS (HCC): ICD-10-CM

## 2018-10-23 DIAGNOSIS — E11.621 DIABETIC FOOT ULCER WITH OSTEOMYELITIS (HCC): ICD-10-CM

## 2018-10-23 DIAGNOSIS — M86.9 DIABETIC FOOT ULCER WITH OSTEOMYELITIS (HCC): ICD-10-CM

## 2018-10-23 DIAGNOSIS — E11.69 DIABETIC FOOT ULCER WITH OSTEOMYELITIS (HCC): ICD-10-CM

## 2018-10-23 PROCEDURE — 25010000002 DAPTOMYCIN PER 1 MG: Performed by: PODIATRIST

## 2018-10-23 PROCEDURE — 25010000002 ERTAPENEM 1 GM/100ML SOLUTION: Performed by: PODIATRIST

## 2018-10-23 PROCEDURE — 96365 THER/PROPH/DIAG IV INF INIT: CPT

## 2018-10-23 PROCEDURE — 97602 WOUND(S) CARE NON-SELECTIVE: CPT

## 2018-10-23 PROCEDURE — 96367 TX/PROPH/DG ADDL SEQ IV INF: CPT

## 2018-10-23 RX ORDER — ASCORBIC ACID 500 MG
500 TABLET ORAL DAILY
COMMUNITY

## 2018-10-23 RX ADMIN — ERTAPENEM SODIUM 1 G: 1 INJECTION, POWDER, LYOPHILIZED, FOR SOLUTION INTRAMUSCULAR; INTRAVENOUS at 10:22

## 2018-10-23 RX ADMIN — DAPTOMYCIN 500 MG: 500 INJECTION, POWDER, LYOPHILIZED, FOR SOLUTION INTRAVENOUS at 09:47

## 2018-10-23 NOTE — OUTREACH NOTE
Called pt and completed TCM. Pt confirmed appt for 10/31/2018 @930 with Kerry. Called and spoke with Genesis in medical records at [facility] and requested records.

## 2018-10-24 ENCOUNTER — APPOINTMENT (OUTPATIENT)
Dept: INFUSION THERAPY | Facility: HOSPITAL | Age: 33
End: 2018-10-24
Attending: INTERNAL MEDICINE

## 2018-10-24 ENCOUNTER — HOSPITAL ENCOUNTER (OUTPATIENT)
Dept: INFUSION THERAPY | Facility: HOSPITAL | Age: 33
Setting detail: INFUSION SERIES
Discharge: HOME | End: 2018-10-24
Attending: PODIATRIST

## 2018-10-24 ENCOUNTER — TRANSCRIBE ORDERS (OUTPATIENT)
Dept: INFUSION THERAPY | Facility: HOSPITAL | Age: 33
End: 2018-10-24

## 2018-10-24 VITALS
SYSTOLIC BLOOD PRESSURE: 99 MMHG | RESPIRATION RATE: 18 BRPM | DIASTOLIC BLOOD PRESSURE: 61 MMHG | HEART RATE: 80 BPM | TEMPERATURE: 98 F

## 2018-10-24 DIAGNOSIS — M86.10 OSTEOMYELITIS, ACUTE (HCC): Primary | ICD-10-CM

## 2018-10-24 DIAGNOSIS — L97.509 DIABETIC FOOT ULCER WITH OSTEOMYELITIS (HCC): ICD-10-CM

## 2018-10-24 DIAGNOSIS — E11.69 DIABETIC FOOT ULCER WITH OSTEOMYELITIS (HCC): ICD-10-CM

## 2018-10-24 DIAGNOSIS — M86.9 DIABETIC FOOT ULCER WITH OSTEOMYELITIS (HCC): ICD-10-CM

## 2018-10-24 DIAGNOSIS — E11.621 DIABETIC FOOT ULCER WITH OSTEOMYELITIS (HCC): ICD-10-CM

## 2018-10-24 PROCEDURE — 97602 WOUND(S) CARE NON-SELECTIVE: CPT

## 2018-10-24 PROCEDURE — 25010000002 ERTAPENEM 1 GM/100ML SOLUTION: Performed by: PODIATRIST

## 2018-10-24 PROCEDURE — 96367 TX/PROPH/DG ADDL SEQ IV INF: CPT

## 2018-10-24 PROCEDURE — 96365 THER/PROPH/DIAG IV INF INIT: CPT

## 2018-10-24 PROCEDURE — 25010000002 DAPTOMYCIN PER 1 MG: Performed by: PODIATRIST

## 2018-10-24 RX ADMIN — ERTAPENEM SODIUM 1 G: 1 INJECTION, POWDER, LYOPHILIZED, FOR SOLUTION INTRAMUSCULAR; INTRAVENOUS at 16:32

## 2018-10-24 RX ADMIN — DAPTOMYCIN 500 MG: 500 INJECTION, POWDER, LYOPHILIZED, FOR SOLUTION INTRAVENOUS at 17:09

## 2018-10-25 ENCOUNTER — APPOINTMENT (OUTPATIENT)
Dept: INFUSION THERAPY | Facility: HOSPITAL | Age: 33
End: 2018-10-25
Attending: INTERNAL MEDICINE

## 2018-10-25 ENCOUNTER — HOSPITAL ENCOUNTER (OUTPATIENT)
Dept: INFUSION THERAPY | Facility: HOSPITAL | Age: 33
Setting detail: INFUSION SERIES
Discharge: HOME | End: 2018-10-25
Attending: INTERNAL MEDICINE

## 2018-10-25 ENCOUNTER — HOSPITAL ENCOUNTER (OUTPATIENT)
Dept: INFUSION THERAPY | Facility: HOSPITAL | Age: 33
Setting detail: INFUSION SERIES
Discharge: HOME | End: 2018-10-25
Attending: PODIATRIST

## 2018-10-25 VITALS
SYSTOLIC BLOOD PRESSURE: 151 MMHG | DIASTOLIC BLOOD PRESSURE: 83 MMHG | HEART RATE: 76 BPM | RESPIRATION RATE: 18 BRPM | TEMPERATURE: 98.4 F

## 2018-10-25 DIAGNOSIS — E11.621 DIABETIC FOOT ULCER WITH OSTEOMYELITIS (HCC): ICD-10-CM

## 2018-10-25 DIAGNOSIS — E11.69 DIABETIC FOOT ULCER WITH OSTEOMYELITIS (HCC): ICD-10-CM

## 2018-10-25 DIAGNOSIS — M86.9 DIABETIC FOOT ULCER WITH OSTEOMYELITIS (HCC): ICD-10-CM

## 2018-10-25 DIAGNOSIS — L97.509 DIABETIC FOOT ULCER WITH OSTEOMYELITIS (HCC): ICD-10-CM

## 2018-10-25 LAB
ANION GAP SERPL CALCULATED.3IONS-SCNC: 3.1 MMOL/L (ref 3.6–11.2)
BUN BLD-MCNC: 10 MG/DL (ref 7–21)
BUN/CREAT SERPL: 15.9 (ref 7–25)
CALCIUM SPEC-SCNC: 8.8 MG/DL (ref 7.7–10)
CHLORIDE SERPL-SCNC: 108 MMOL/L (ref 99–112)
CK SERPL-CCNC: 47 U/L (ref 24–173)
CO2 SERPL-SCNC: 30.9 MMOL/L (ref 24.3–31.9)
CREAT BLD-MCNC: 0.63 MG/DL (ref 0.43–1.29)
CRP SERPL-MCNC: 1.9 MG/DL (ref 0–0.99)
GFR SERPL CREATININE-BSD FRML MDRD: 108 ML/MIN/1.73
GLUCOSE BLD-MCNC: 111 MG/DL (ref 70–110)
OSMOLALITY SERPL CALC.SUM OF ELEC: 282.9 MOSM/KG (ref 273–305)
POTASSIUM BLD-SCNC: 3.9 MMOL/L (ref 3.5–5.3)
SODIUM BLD-SCNC: 142 MMOL/L (ref 135–153)

## 2018-10-25 PROCEDURE — 80048 BASIC METABOLIC PNL TOTAL CA: CPT | Performed by: PODIATRIST

## 2018-10-25 PROCEDURE — 25010000002 ERTAPENEM 1 GM/100ML SOLUTION: Performed by: PODIATRIST

## 2018-10-25 PROCEDURE — 96365 THER/PROPH/DIAG IV INF INIT: CPT

## 2018-10-25 PROCEDURE — 97602 WOUND(S) CARE NON-SELECTIVE: CPT

## 2018-10-25 PROCEDURE — 82550 ASSAY OF CK (CPK): CPT | Performed by: PODIATRIST

## 2018-10-25 PROCEDURE — 86140 C-REACTIVE PROTEIN: CPT | Performed by: PODIATRIST

## 2018-10-25 PROCEDURE — 25010000002 DAPTOMYCIN PER 1 MG: Performed by: PODIATRIST

## 2018-10-25 PROCEDURE — 96367 TX/PROPH/DG ADDL SEQ IV INF: CPT

## 2018-10-25 RX ADMIN — DAPTOMYCIN 500 MG: 500 INJECTION, POWDER, LYOPHILIZED, FOR SOLUTION INTRAVENOUS at 08:24

## 2018-10-25 RX ADMIN — ERTAPENEM SODIUM 1 G: 1 INJECTION, POWDER, LYOPHILIZED, FOR SOLUTION INTRAMUSCULAR; INTRAVENOUS at 08:57

## 2018-10-26 ENCOUNTER — HOSPITAL ENCOUNTER (OUTPATIENT)
Dept: INFUSION THERAPY | Facility: HOSPITAL | Age: 33
Setting detail: INFUSION SERIES
Discharge: HOME | End: 2018-10-26
Attending: PODIATRIST

## 2018-10-26 ENCOUNTER — APPOINTMENT (OUTPATIENT)
Dept: INFUSION THERAPY | Facility: HOSPITAL | Age: 33
End: 2018-10-26
Attending: INTERNAL MEDICINE

## 2018-10-26 VITALS
RESPIRATION RATE: 16 BRPM | HEART RATE: 83 BPM | SYSTOLIC BLOOD PRESSURE: 127 MMHG | DIASTOLIC BLOOD PRESSURE: 71 MMHG | TEMPERATURE: 98.4 F

## 2018-10-26 DIAGNOSIS — E11.69 DIABETIC FOOT ULCER WITH OSTEOMYELITIS (HCC): ICD-10-CM

## 2018-10-26 DIAGNOSIS — E11.621 DIABETIC FOOT ULCER WITH OSTEOMYELITIS (HCC): ICD-10-CM

## 2018-10-26 DIAGNOSIS — L97.509 DIABETIC FOOT ULCER WITH OSTEOMYELITIS (HCC): ICD-10-CM

## 2018-10-26 DIAGNOSIS — M86.9 DIABETIC FOOT ULCER WITH OSTEOMYELITIS (HCC): ICD-10-CM

## 2018-10-26 PROCEDURE — 97602 WOUND(S) CARE NON-SELECTIVE: CPT

## 2018-10-26 PROCEDURE — 96367 TX/PROPH/DG ADDL SEQ IV INF: CPT

## 2018-10-26 PROCEDURE — 25010000002 ERTAPENEM 1 GM/100ML SOLUTION: Performed by: PODIATRIST

## 2018-10-26 PROCEDURE — 96365 THER/PROPH/DIAG IV INF INIT: CPT

## 2018-10-26 PROCEDURE — 25010000002 DAPTOMYCIN PER 1 MG: Performed by: PODIATRIST

## 2018-10-26 PROCEDURE — G0463 HOSPITAL OUTPT CLINIC VISIT: HCPCS

## 2018-10-26 RX ADMIN — ERTAPENEM SODIUM 1 G: 1 INJECTION, POWDER, LYOPHILIZED, FOR SOLUTION INTRAMUSCULAR; INTRAVENOUS at 09:05

## 2018-10-26 RX ADMIN — DAPTOMYCIN 500 MG: 500 INJECTION, POWDER, LYOPHILIZED, FOR SOLUTION INTRAVENOUS at 08:33

## 2018-10-27 ENCOUNTER — HOSPITAL ENCOUNTER (OUTPATIENT)
Dept: INFUSION THERAPY | Facility: HOSPITAL | Age: 33
Setting detail: INFUSION SERIES
Discharge: HOME | End: 2018-10-27
Attending: PODIATRIST

## 2018-10-27 VITALS — HEART RATE: 88 BPM | DIASTOLIC BLOOD PRESSURE: 92 MMHG | SYSTOLIC BLOOD PRESSURE: 167 MMHG

## 2018-10-27 DIAGNOSIS — L97.509 DIABETIC FOOT ULCER WITH OSTEOMYELITIS (HCC): ICD-10-CM

## 2018-10-27 DIAGNOSIS — E11.69 DIABETIC FOOT ULCER WITH OSTEOMYELITIS (HCC): Primary | ICD-10-CM

## 2018-10-27 DIAGNOSIS — E11.621 DIABETIC FOOT ULCER WITH OSTEOMYELITIS (HCC): Primary | ICD-10-CM

## 2018-10-27 DIAGNOSIS — L97.509 DIABETIC FOOT ULCER WITH OSTEOMYELITIS (HCC): Primary | ICD-10-CM

## 2018-10-27 DIAGNOSIS — E11.621 DIABETIC FOOT ULCER WITH OSTEOMYELITIS (HCC): ICD-10-CM

## 2018-10-27 DIAGNOSIS — E11.69 DIABETIC FOOT ULCER WITH OSTEOMYELITIS (HCC): ICD-10-CM

## 2018-10-27 DIAGNOSIS — M86.9 DIABETIC FOOT ULCER WITH OSTEOMYELITIS (HCC): ICD-10-CM

## 2018-10-27 DIAGNOSIS — M86.9 DIABETIC FOOT ULCER WITH OSTEOMYELITIS (HCC): Primary | ICD-10-CM

## 2018-10-27 PROCEDURE — 96367 TX/PROPH/DG ADDL SEQ IV INF: CPT

## 2018-10-27 PROCEDURE — 97602 WOUND(S) CARE NON-SELECTIVE: CPT

## 2018-10-27 PROCEDURE — G0463 HOSPITAL OUTPT CLINIC VISIT: HCPCS

## 2018-10-27 PROCEDURE — 25010000002 DAPTOMYCIN PER 1 MG: Performed by: PODIATRIST

## 2018-10-27 PROCEDURE — 96365 THER/PROPH/DIAG IV INF INIT: CPT

## 2018-10-27 PROCEDURE — 25010000002 ERTAPENEM 1 GM/100ML SOLUTION: Performed by: PODIATRIST

## 2018-10-27 RX ADMIN — ERTAPENEM SODIUM 1 G: 1 INJECTION, POWDER, LYOPHILIZED, FOR SOLUTION INTRAMUSCULAR; INTRAVENOUS at 11:31

## 2018-10-27 RX ADMIN — DAPTOMYCIN 500 MG: 500 INJECTION, POWDER, LYOPHILIZED, FOR SOLUTION INTRAVENOUS at 10:54

## 2018-10-27 NOTE — PATIENT INSTRUCTIONS
Daptomycin injection  What is this medicine?  DAPTOMYCIN (DAP toe MYE sin) is a lipopeptide antibiotic. It is used to treat certain kinds of bacterial infections. It will not work for colds, flu, or other viral infections.  This medicine may be used for other purposes; ask your health care provider or pharmacist if you have questions.  COMMON BRAND NAME(S): Zia Lizarraga  What should I tell my health care provider before I take this medicine?  They need to know if you have any of these conditions:  -kidney disease  -an unusual or allergic reaction to daptomycin, other medicines, foods, dyes, or preservatives  -pregnant or trying to get pregnant  -breast-feeding  How should I use this medicine?  This medicine is for infusion into a vein. It is usually given by a health care professional in a hospital or clinic setting.  If you get this medicine at home, you will be taught how to prepare and give this medicine. Use exactly as directed. Take your medicine at regular intervals. Do not take your medicine more often than directed. Take all of your medicine as directed even if you think you are better. Do not skip doses or stop your medicine early.  It is important that you put your used needles and syringes in a special sharps container. Do not put them in a trash can. If you do not have a sharps container, call your pharmacist or healthcare provider to get one.  Talk to your pediatrician regarding the use of this medicine in children. While this drug may be prescribed for children as young as 1 year for selected conditions, precautions do apply.  Overdosage: If you think you have taken too much of this medicine contact a poison control center or emergency room at once.  NOTE: This medicine is only for you. Do not share this medicine with others.  What if I miss a dose?  If you miss a dose, take it as soon as you can. If it is almost time for your next dose, take only that dose. Do not take double or extra  doses.  What may interact with this medicine?  -birth control pills  -some antibiotics like tobramycin  This list may not describe all possible interactions. Give your health care provider a list of all the medicines, herbs, non-prescription drugs, or dietary supplements you use. Also tell them if you smoke, drink alcohol, or use illegal drugs. Some items may interact with your medicine.  What should I watch for while using this medicine?  Your condition will be monitored carefully while you are receiving this medicine.  Do not treat diarrhea with over the counter products. Contact your doctor if you have diarrhea that lasts more than 2 days or if it is severe and watery.  What side effects may I notice from receiving this medicine?  Side effects that you should report to your doctor or health care professional as soon as possible:  -allergic reactions like skin rash, itching or hives, swelling of the face, lips, or tongue  -breathing problems  -fever, infection  -high or low blood pressure  -muscle pain  -numb or tingling pain  -trouble passing urine or change in the amount of urine  -unusually tired or weak  -vomiting  Side effects that usually do not require medical attention (report to your doctor or health care professional if they continue or are bothersome):  -constipation or diarrhea  -trouble sleeping  -headache  -nausea  -stomach upset  This list may not describe all possible side effects. Call your doctor for medical advice about side effects. You may report side effects to FDA at 0-505-FDA-0343.  Where should I keep my medicine?  Keep out of the reach of children.  If you are using this medicine at home, you will be instructed on how to store this medicine. Throw away any unused medicine after the expiration date on the label.  NOTE: This sheet is a summary. It may not cover all possible information. If you have questions about this medicine, talk to your doctor, pharmacist, or health care provider.  ©  2018 Elsevier/Gold Standard (2017 11:21:16)  Ceftriaxone injection  What is this medicine?  CEFTRIAXONE (sef try AX one) is a cephalosporin antibiotic. It is used to treat certain kinds of bacterial infections. It will not work for colds, flu, or other viral infections.  This medicine may be used for other purposes; ask your health care provider or pharmacist if you have questions.  COMMON BRAND NAME(S): Kel  What should I tell my health care provider before I take this medicine?  They need to know if you have any of these conditions:  -any chronic illness  -bowel disease, like colitis  -both kidney and liver disease  -high bilirubin level in  patients  -an unusual or allergic reaction to ceftriaxone, other cephalosporin or penicillin antibiotics, foods, dyes, or preservatives  -pregnant or trying to get pregnant  -breast-feeding  How should I use this medicine?  This medicine is injected into a muscle or infused it into a vein. It is usually given in a medical office or clinic. If you are to give this medicine you will be taught how to inject it. Follow instructions carefully. Use your doses at regular intervals. Do not take your medicine more often than directed. Do not skip doses or stop your medicine early even if you feel better. Do not stop taking except on your doctor's advice.  Talk to your pediatrician regarding the use of this medicine in children. Special care may be needed.  Overdosage: If you think you have taken too much of this medicine contact a poison control center or emergency room at once.  NOTE: This medicine is only for you. Do not share this medicine with others.  What if I miss a dose?  If you miss a dose, take it as soon as you can. If it is almost time for your next dose, take only that dose. Do not take double or extra doses.  What may interact with this medicine?  Do not take this medicine with any of the following medications:  -intravenous calcium  This medicine may  also interact with the following medications:  -birth control pills  This list may not describe all possible interactions. Give your health care provider a list of all the medicines, herbs, non-prescription drugs, or dietary supplements you use. Also tell them if you smoke, drink alcohol, or use illegal drugs. Some items may interact with your medicine.  What should I watch for while using this medicine?  Tell your doctor or health care professional if your symptoms do not improve or if they get worse.  Do not treat diarrhea with over the counter products. Contact your doctor if you have diarrhea that lasts more than 2 days or if it is severe and watery.  If you are being treated for a sexually transmitted disease, avoid sexual contact until you have finished your treatment. Having sex can infect your sexual partner.  Calcium may bind to this medicine and cause lung or kidney problems. Avoid calcium products while taking this medicine and for 48 hours after taking the last dose of this medicine.  What side effects may I notice from receiving this medicine?  Side effects that you should report to your doctor or health care professional as soon as possible:  -allergic reactions like skin rash, itching or hives, swelling of the face, lips, or tongue  -breathing problems  -fever, chills  -irregular heartbeat  -pain when passing urine  -seizures  -stomach pain, cramps  -unusual bleeding, bruising  -unusually weak or tired  Side effects that usually do not require medical attention (report to your doctor or health care professional if they continue or are bothersome):  -diarrhea  -dizzy, drowsy  -headache  -nausea, vomiting  -pain, swelling, irritation where injected  -stomach upset  -sweating  This list may not describe all possible side effects. Call your doctor for medical advice about side effects. You may report side effects to FDA at 3-032-FDA-6280.  Where should I keep my medicine?  Keep out of the reach of  children.  Store at room temperature below 25 degrees C (77 degrees F). Protect from light. Throw away any unused vials after the expiration date.  NOTE: This sheet is a summary. It may not cover all possible information. If you have questions about this medicine, talk to your doctor, pharmacist, or health care provider.  © 2018 Elsevier/Gold Standard (2015-07-06 09:14:54)  Ertapenem injection  What is this medicine?  ERTAPENEM (er ta PEN em) is a carbapenem antibiotic. It is used to treat certain kinds of bacterial infections. It will not work for colds, flu, or other viral infections.  This medicine may be used for other purposes; ask your health care provider or pharmacist if you have questions.  COMMON BRAND NAME(S): Invanz  What should I tell my health care provider before I take this medicine?  They need to know if you have any of these conditions:  -brain tumor, lesion  -kidney disease  -seizure disorder  -an unusual or allergic reaction to ertapenem, other antibiotics, amide local anesthetics like lidocaine, or other medicines, foods, dyes, or preservatives  -pregnant or trying to get pregnant  -breast-feeding  How should I use this medicine?  This medicine is infused into a vein or injected deep into a muscle. It is usually given by a health care professional in a hospital or clinic setting.  If you get this medicine at home, you will be taught how to prepare and give this medicine. Use exactly as directed. Take your medicine at regular intervals. Do not take your medicine more often than directed.  It is important that you put your used needles and syringes in a special sharps container. Do not put them in a trash can. If you do not have a sharps container, call your pharmacist or healthcare provider to get one.  Talk to your pediatrician regarding the use of this medicine in children. While this drug may be prescribed for children as young as 3 months old for selected conditions, precautions do  apply.  Overdosage: If you think you have taken too much of this medicine contact a poison control center or emergency room at once.  NOTE: This medicine is only for you. Do not share this medicine with others.  What if I miss a dose?  If you miss a dose, take it as soon as you can. If it is almost time for your next dose, take only that dose. Do not take double or extra doses.  What may interact with this medicine?  -birth control pills  -probenecid  This list may not describe all possible interactions. Give your health care provider a list of all the medicines, herbs, non-prescription drugs, or dietary supplements you use. Also tell them if you smoke, drink alcohol, or use illegal drugs. Some items may interact with your medicine.  What should I watch for while using this medicine?  Tell your doctor or health care professional if your symptoms do not improve or if you get new symptoms. Your doctor will monitor your condition and blood work as needed.  Do not treat diarrhea with over the counter products. Contact your doctor if you have diarrhea that lasts more than 2 days or if it is severe and watery.  What side effects may I notice from receiving this medicine?  Side effects that you should report to your doctor or health care professional as soon as possible:  -allergic reactions like skin rash, itching or hives, swelling of the face, lips, or tongue  -anxiety, confusion, dizzy  -chest pain  -difficulty breathing, wheezing  -edema, swelling  -fever  -irregular heart rate, blood pressure  -pain or difficulty passing urine  -seizures  -unusually weak or tired  -white or red patches in mouth  Side effects that usually do not require medical attention (report to your doctor or health care professional if they continue or are bothersome):  -constipation or diarrhea  -difficulty sleeping  -headache  -nausea, vomiting  -pain, swelling or irritation where injected  -stomach upset  -vaginal itch, irritation  This list  may not describe all possible side effects. Call your doctor for medical advice about side effects. You may report side effects to FDA at 5-052-ESW-4059.  Where should I keep my medicine?  Keep out of the reach of children.  You will be instructed on how to store this medicine. Throw away any unused medicine after the expiration date on the label.  NOTE: This sheet is a summary. It may not cover all possible information. If you have questions about this medicine, talk to your doctor, pharmacist, or health care provider.  © 2018 Elsevier/Gold Standard (2014-07-25 07:36:44)

## 2018-10-28 ENCOUNTER — HOSPITAL ENCOUNTER (OUTPATIENT)
Dept: INFUSION THERAPY | Facility: HOSPITAL | Age: 33
Setting detail: INFUSION SERIES
Discharge: HOME | End: 2018-10-28
Attending: PODIATRIST

## 2018-10-28 ENCOUNTER — APPOINTMENT (OUTPATIENT)
Dept: INFUSION THERAPY | Facility: HOSPITAL | Age: 33
End: 2018-10-28
Attending: PODIATRIST

## 2018-10-28 VITALS
DIASTOLIC BLOOD PRESSURE: 85 MMHG | TEMPERATURE: 98.2 F | SYSTOLIC BLOOD PRESSURE: 146 MMHG | RESPIRATION RATE: 20 BRPM | HEART RATE: 92 BPM

## 2018-10-28 DIAGNOSIS — E11.621 DIABETIC FOOT ULCER WITH OSTEOMYELITIS (HCC): ICD-10-CM

## 2018-10-28 DIAGNOSIS — M86.9 DIABETIC FOOT ULCER WITH OSTEOMYELITIS (HCC): ICD-10-CM

## 2018-10-28 DIAGNOSIS — E11.69 DIABETIC FOOT ULCER WITH OSTEOMYELITIS (HCC): ICD-10-CM

## 2018-10-28 DIAGNOSIS — L97.509 DIABETIC FOOT ULCER WITH OSTEOMYELITIS (HCC): ICD-10-CM

## 2018-10-28 PROCEDURE — 96367 TX/PROPH/DG ADDL SEQ IV INF: CPT

## 2018-10-28 PROCEDURE — 25010000002 ERTAPENEM 1 GM/100ML SOLUTION: Performed by: PODIATRIST

## 2018-10-28 PROCEDURE — 96365 THER/PROPH/DIAG IV INF INIT: CPT

## 2018-10-28 PROCEDURE — 97602 WOUND(S) CARE NON-SELECTIVE: CPT

## 2018-10-28 PROCEDURE — G0463 HOSPITAL OUTPT CLINIC VISIT: HCPCS

## 2018-10-28 PROCEDURE — 25010000002 DAPTOMYCIN PER 1 MG: Performed by: PODIATRIST

## 2018-10-28 RX ADMIN — DAPTOMYCIN 500 MG: 500 INJECTION, POWDER, LYOPHILIZED, FOR SOLUTION INTRAVENOUS at 09:31

## 2018-10-28 RX ADMIN — ERTAPENEM SODIUM 1 G: 1 INJECTION, POWDER, LYOPHILIZED, FOR SOLUTION INTRAMUSCULAR; INTRAVENOUS at 10:00

## 2018-10-29 ENCOUNTER — HOSPITAL ENCOUNTER (OUTPATIENT)
Dept: INFUSION THERAPY | Facility: HOSPITAL | Age: 33
Setting detail: INFUSION SERIES
Discharge: HOME | End: 2018-10-29
Attending: PODIATRIST

## 2018-10-29 ENCOUNTER — APPOINTMENT (OUTPATIENT)
Dept: INFUSION THERAPY | Facility: HOSPITAL | Age: 33
End: 2018-10-29
Attending: INTERNAL MEDICINE

## 2018-10-29 VITALS
SYSTOLIC BLOOD PRESSURE: 149 MMHG | TEMPERATURE: 98.2 F | HEART RATE: 104 BPM | RESPIRATION RATE: 18 BRPM | DIASTOLIC BLOOD PRESSURE: 96 MMHG

## 2018-10-29 DIAGNOSIS — E11.69 DIABETIC FOOT ULCER WITH OSTEOMYELITIS (HCC): ICD-10-CM

## 2018-10-29 DIAGNOSIS — M86.9 DIABETIC FOOT ULCER WITH OSTEOMYELITIS (HCC): ICD-10-CM

## 2018-10-29 DIAGNOSIS — L97.509 DIABETIC FOOT ULCER WITH OSTEOMYELITIS (HCC): ICD-10-CM

## 2018-10-29 DIAGNOSIS — E11.621 DIABETIC FOOT ULCER WITH OSTEOMYELITIS (HCC): ICD-10-CM

## 2018-10-29 PROCEDURE — 96367 TX/PROPH/DG ADDL SEQ IV INF: CPT

## 2018-10-29 PROCEDURE — 25010000002 ERTAPENEM 1 GM/100ML SOLUTION: Performed by: PODIATRIST

## 2018-10-29 PROCEDURE — 97602 WOUND(S) CARE NON-SELECTIVE: CPT

## 2018-10-29 PROCEDURE — 25010000002 DAPTOMYCIN PER 1 MG: Performed by: PODIATRIST

## 2018-10-29 PROCEDURE — 96365 THER/PROPH/DIAG IV INF INIT: CPT

## 2018-10-29 RX ADMIN — DAPTOMYCIN 500 MG: 500 INJECTION, POWDER, LYOPHILIZED, FOR SOLUTION INTRAVENOUS at 08:48

## 2018-10-29 RX ADMIN — ERTAPENEM SODIUM 1 G: 1 INJECTION, POWDER, LYOPHILIZED, FOR SOLUTION INTRAMUSCULAR; INTRAVENOUS at 08:13

## 2018-10-30 ENCOUNTER — HOSPITAL ENCOUNTER (OUTPATIENT)
Dept: INFUSION THERAPY | Facility: HOSPITAL | Age: 33
Setting detail: INFUSION SERIES
Discharge: HOME | End: 2018-10-30
Attending: PODIATRIST

## 2018-10-30 ENCOUNTER — APPOINTMENT (OUTPATIENT)
Dept: INFUSION THERAPY | Facility: HOSPITAL | Age: 33
End: 2018-10-30
Attending: INTERNAL MEDICINE

## 2018-10-30 VITALS
SYSTOLIC BLOOD PRESSURE: 93 MMHG | DIASTOLIC BLOOD PRESSURE: 50 MMHG | RESPIRATION RATE: 18 BRPM | HEART RATE: 101 BPM | TEMPERATURE: 98.4 F

## 2018-10-30 DIAGNOSIS — M86.9 DIABETIC FOOT ULCER WITH OSTEOMYELITIS (HCC): ICD-10-CM

## 2018-10-30 DIAGNOSIS — L97.509 DIABETIC FOOT ULCER WITH OSTEOMYELITIS (HCC): ICD-10-CM

## 2018-10-30 DIAGNOSIS — E11.621 DIABETIC FOOT ULCER WITH OSTEOMYELITIS (HCC): ICD-10-CM

## 2018-10-30 DIAGNOSIS — E11.69 DIABETIC FOOT ULCER WITH OSTEOMYELITIS (HCC): ICD-10-CM

## 2018-10-30 PROCEDURE — 25010000002 ERTAPENEM 1 GM/100ML SOLUTION: Performed by: PODIATRIST

## 2018-10-30 PROCEDURE — 25010000002 DAPTOMYCIN PER 1 MG: Performed by: PODIATRIST

## 2018-10-30 PROCEDURE — 96365 THER/PROPH/DIAG IV INF INIT: CPT

## 2018-10-30 PROCEDURE — 96367 TX/PROPH/DG ADDL SEQ IV INF: CPT

## 2018-10-30 PROCEDURE — 97602 WOUND(S) CARE NON-SELECTIVE: CPT

## 2018-10-30 RX ADMIN — DAPTOMYCIN 500 MG: 500 INJECTION, POWDER, LYOPHILIZED, FOR SOLUTION INTRAVENOUS at 15:15

## 2018-10-30 RX ADMIN — ERTAPENEM SODIUM 1 G: 1 INJECTION, POWDER, LYOPHILIZED, FOR SOLUTION INTRAMUSCULAR; INTRAVENOUS at 14:43

## 2018-10-31 ENCOUNTER — HOSPITAL ENCOUNTER (OUTPATIENT)
Dept: INFUSION THERAPY | Facility: HOSPITAL | Age: 33
Setting detail: INFUSION SERIES
Discharge: HOME | End: 2018-10-31
Attending: PODIATRIST

## 2018-10-31 ENCOUNTER — OFFICE VISIT (OUTPATIENT)
Dept: FAMILY MEDICINE CLINIC | Facility: CLINIC | Age: 33
End: 2018-10-31

## 2018-10-31 ENCOUNTER — APPOINTMENT (OUTPATIENT)
Dept: INFUSION THERAPY | Facility: HOSPITAL | Age: 33
End: 2018-10-31
Attending: INTERNAL MEDICINE

## 2018-10-31 ENCOUNTER — APPOINTMENT (OUTPATIENT)
Dept: INFUSION THERAPY | Facility: HOSPITAL | Age: 33
End: 2018-10-31
Attending: PODIATRIST

## 2018-10-31 VITALS
SYSTOLIC BLOOD PRESSURE: 118 MMHG | TEMPERATURE: 98.3 F | DIASTOLIC BLOOD PRESSURE: 79 MMHG | OXYGEN SATURATION: 99 % | HEIGHT: 65 IN | WEIGHT: 177 LBS | HEART RATE: 97 BPM | BODY MASS INDEX: 29.49 KG/M2

## 2018-10-31 VITALS
SYSTOLIC BLOOD PRESSURE: 154 MMHG | RESPIRATION RATE: 20 BRPM | DIASTOLIC BLOOD PRESSURE: 85 MMHG | HEART RATE: 93 BPM | TEMPERATURE: 98.2 F | BODY MASS INDEX: 28.29 KG/M2 | WEIGHT: 170 LBS

## 2018-10-31 DIAGNOSIS — L97.509 DIABETIC FOOT ULCER WITH OSTEOMYELITIS (HCC): ICD-10-CM

## 2018-10-31 DIAGNOSIS — E11.621 DIABETIC FOOT ULCER WITH OSTEOMYELITIS (HCC): ICD-10-CM

## 2018-10-31 DIAGNOSIS — E11.69 DIABETIC FOOT ULCER WITH OSTEOMYELITIS (HCC): Primary | ICD-10-CM

## 2018-10-31 DIAGNOSIS — E11.621 DIABETIC FOOT ULCER WITH OSTEOMYELITIS (HCC): Primary | ICD-10-CM

## 2018-10-31 DIAGNOSIS — M86.9 DIABETIC FOOT ULCER WITH OSTEOMYELITIS (HCC): Primary | ICD-10-CM

## 2018-10-31 DIAGNOSIS — E11.69 DIABETIC FOOT ULCER WITH OSTEOMYELITIS (HCC): ICD-10-CM

## 2018-10-31 DIAGNOSIS — E11.69 TYPE 2 DIABETES MELLITUS WITH OTHER SPECIFIED COMPLICATION, WITH LONG-TERM CURRENT USE OF INSULIN (HCC): ICD-10-CM

## 2018-10-31 DIAGNOSIS — M86.9 DIABETIC FOOT ULCER WITH OSTEOMYELITIS (HCC): ICD-10-CM

## 2018-10-31 DIAGNOSIS — Z79.4 TYPE 2 DIABETES MELLITUS WITH OTHER SPECIFIED COMPLICATION, WITH LONG-TERM CURRENT USE OF INSULIN (HCC): ICD-10-CM

## 2018-10-31 DIAGNOSIS — L97.509 DIABETIC FOOT ULCER WITH OSTEOMYELITIS (HCC): Primary | ICD-10-CM

## 2018-10-31 PROCEDURE — 90471 IMMUNIZATION ADMIN: CPT | Performed by: NURSE PRACTITIONER

## 2018-10-31 PROCEDURE — 96365 THER/PROPH/DIAG IV INF INIT: CPT

## 2018-10-31 PROCEDURE — 99213 OFFICE O/P EST LOW 20 MIN: CPT | Performed by: NURSE PRACTITIONER

## 2018-10-31 PROCEDURE — 25010000002 ERTAPENEM 1 GM/100ML SOLUTION: Performed by: PODIATRIST

## 2018-10-31 PROCEDURE — 97602 WOUND(S) CARE NON-SELECTIVE: CPT

## 2018-10-31 PROCEDURE — 96367 TX/PROPH/DG ADDL SEQ IV INF: CPT

## 2018-10-31 PROCEDURE — 90686 IIV4 VACC NO PRSV 0.5 ML IM: CPT | Performed by: NURSE PRACTITIONER

## 2018-10-31 PROCEDURE — 25010000002 DAPTOMYCIN PER 1 MG: Performed by: PODIATRIST

## 2018-10-31 RX ORDER — LEVOFLOXACIN 750 MG/1
TABLET ORAL DAILY
Refills: 0 | COMMUNITY
Start: 2018-10-05 | End: 2019-01-30

## 2018-10-31 RX ORDER — HYDROCODONE BITARTRATE AND ACETAMINOPHEN 7.5; 325 MG/1; MG/1
TABLET ORAL
Refills: 0 | COMMUNITY
Start: 2018-10-24

## 2018-10-31 RX ADMIN — DAPTOMYCIN 500 MG: 500 INJECTION, POWDER, LYOPHILIZED, FOR SOLUTION INTRAVENOUS at 11:40

## 2018-10-31 RX ADMIN — ERTAPENEM SODIUM 1 G: 1 INJECTION, POWDER, LYOPHILIZED, FOR SOLUTION INTRAMUSCULAR; INTRAVENOUS at 12:09

## 2018-10-31 NOTE — PROGRESS NOTES
Subjective   Cynthia Ríos is a 33 y.o. female.     Chief Complaint: Transitional Care Management    History of Present Illness   Pt here today for transitional care management visit.   Pt has been hospitalized at [facility] due to osteomyelitis of left lateral foot.  She was hospitalized from 10/18/2018 until 10/23/2018.  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  She has a PICC line in upper right arm which was inserted during her hospitalization.  She is continuing to receive IV antibiotics daily at [facility].  She states that she is tolerating the infusions without any problems.  Nurses at infusion clinic are also dressing her foot surgical wound at her visits.  She denies any issues with fever, n/v/d.  She denies any pain in her left foot.  She is ambulating with boot as directed by dr. Edmond.  She states that she does have hydrocodone but she has not had to take any of the medication for pain.  She also has a follow up appt scheduled with Dr. Edmond and I have advised her to keep that appt as scheduled.    She appears to be doing well at this time.  No change in medications or plan of care at this time.     Family History   Problem Relation Age of Onset   • Diabetes Mother    • Heart disease Mother    • COPD Mother    • Atrial fibrillation Mother    • Heart failure Mother    • Liver disease Mother    • Diabetes Father        Social History     Social History   • Marital status: Single     Spouse name: N/A   • Number of children: N/A   • Years of education: N/A     Occupational History   • Not on file.     Social History Main Topics   • Smoking status: Never Smoker   • Smokeless tobacco: Never Used   • Alcohol use Yes      Comment: a couple a month    • Drug use: No   • Sexual activity: Defer     Other Topics Concern   • Not on file     Social History Narrative   • No narrative on file       Past Medical History:   Diagnosis Date   • Anemia    • Diabetes mellitus, type 2 (CMS/HCC)    •  "History of diabetic ulcer of foot    • History of low potassium    • History of seizures    • PCOS (polycystic ovarian syndrome)        Review of Systems   Constitutional: Negative.    HENT: Negative.    Respiratory: Negative.    Cardiovascular: Negative.    Gastrointestinal: Negative.    Musculoskeletal: Negative.    Skin: Negative.    Neurological: Negative.    Psychiatric/Behavioral: Negative.        Objective   Physical Exam   Constitutional: She is oriented to person, place, and time. She appears well-developed and well-nourished.   Neck: Normal range of motion. Neck supple.   Cardiovascular: Normal rate, regular rhythm and normal heart sounds.    Pulmonary/Chest: Effort normal and breath sounds normal.   Neurological: She is alert and oriented to person, place, and time.   Skin: Skin is warm and dry. Capillary refill takes less than 2 seconds.   Dressing to left foot intact; toes pink, warm and dry; walking boot   Psychiatric: She has a normal mood and affect. Her behavior is normal. Judgment and thought content normal.   Nursing note and vitals reviewed.      Procedures    Vitals: Blood pressure 118/79, pulse 97, temperature 98.3 °F (36.8 °C), temperature source Oral, height 165.1 cm (65\"), weight 80.3 kg (177 lb), SpO2 99 %, not currently breastfeeding.    Allergies: No Known Allergies     During this visit the following were done:  Labs Reviewed []    Labs Ordered []    Radiology Reports Reviewed []    Radiology Ordered []    PCP Records Reviewed []    Referring Provider Records Reviewed []    ER Records Reviewed []    Hospital Records Reviewed []    History Obtained From Family []    Radiology Images Reviewed []    Other Reviewed []    Records Requested []      Assessment/Plan   Cynthia was seen today for transitional care management.    Diagnoses and all orders for this visit:    Diabetic foot ulcer with osteomyelitis (CMS/[facility])    Type 2 diabetes mellitus with other specified complication, with long-term " current use of insulin (CMS/[facility])  -     Insulin Lispro (HUMALOG) 100 UNIT/ML solution pen-injector; Inject 10 units subQ three times daily before meals & per sliding scale before meals & at bedtiime.

## 2018-11-01 ENCOUNTER — HOSPITAL ENCOUNTER (OUTPATIENT)
Dept: INFUSION THERAPY | Facility: HOSPITAL | Age: 33
Setting detail: INFUSION SERIES
Discharge: HOME | End: 2018-11-01
Attending: PODIATRIST

## 2018-11-01 ENCOUNTER — HOSPITAL ENCOUNTER (OUTPATIENT)
Dept: INFUSION THERAPY | Facility: HOSPITAL | Age: 33
Setting detail: INFUSION SERIES
Discharge: HOME | End: 2018-11-01
Attending: INTERNAL MEDICINE

## 2018-11-01 ENCOUNTER — APPOINTMENT (OUTPATIENT)
Dept: INFUSION THERAPY | Facility: HOSPITAL | Age: 33
End: 2018-11-01
Attending: INTERNAL MEDICINE

## 2018-11-01 VITALS
TEMPERATURE: 99 F | HEART RATE: 105 BPM | DIASTOLIC BLOOD PRESSURE: 90 MMHG | RESPIRATION RATE: 20 BRPM | SYSTOLIC BLOOD PRESSURE: 134 MMHG

## 2018-11-01 DIAGNOSIS — M86.9 DIABETIC FOOT ULCER WITH OSTEOMYELITIS (HCC): ICD-10-CM

## 2018-11-01 DIAGNOSIS — L97.509 DIABETIC FOOT ULCER WITH OSTEOMYELITIS (HCC): ICD-10-CM

## 2018-11-01 DIAGNOSIS — E11.621 DIABETIC FOOT ULCER WITH OSTEOMYELITIS (HCC): ICD-10-CM

## 2018-11-01 DIAGNOSIS — E11.69 DIABETIC FOOT ULCER WITH OSTEOMYELITIS (HCC): ICD-10-CM

## 2018-11-01 LAB
ANION GAP SERPL CALCULATED.3IONS-SCNC: 4.6 MMOL/L (ref 3.6–11.2)
BUN BLD-MCNC: 6 MG/DL (ref 7–21)
BUN/CREAT SERPL: 10 (ref 7–25)
CALCIUM SPEC-SCNC: 8.6 MG/DL (ref 7.7–10)
CHLORIDE SERPL-SCNC: 106 MMOL/L (ref 99–112)
CK SERPL-CCNC: 54 U/L (ref 24–173)
CO2 SERPL-SCNC: 30.4 MMOL/L (ref 24.3–31.9)
CREAT BLD-MCNC: 0.6 MG/DL (ref 0.43–1.29)
CRP SERPL-MCNC: <0.5 MG/DL (ref 0–0.99)
GFR SERPL CREATININE-BSD FRML MDRD: 114 ML/MIN/1.73
GLUCOSE BLD-MCNC: 179 MG/DL (ref 70–110)
OSMOLALITY SERPL CALC.SUM OF ELEC: 283.3 MOSM/KG (ref 273–305)
POTASSIUM BLD-SCNC: 3.8 MMOL/L (ref 3.5–5.3)
SODIUM BLD-SCNC: 141 MMOL/L (ref 135–153)

## 2018-11-01 PROCEDURE — 96365 THER/PROPH/DIAG IV INF INIT: CPT

## 2018-11-01 PROCEDURE — 80048 BASIC METABOLIC PNL TOTAL CA: CPT | Performed by: INTERNAL MEDICINE

## 2018-11-01 PROCEDURE — 82550 ASSAY OF CK (CPK): CPT | Performed by: INTERNAL MEDICINE

## 2018-11-01 PROCEDURE — 96367 TX/PROPH/DG ADDL SEQ IV INF: CPT

## 2018-11-01 PROCEDURE — 25010000002 ERTAPENEM 1 GM/100ML SOLUTION: Performed by: PODIATRIST

## 2018-11-01 PROCEDURE — 97602 WOUND(S) CARE NON-SELECTIVE: CPT

## 2018-11-01 PROCEDURE — 86140 C-REACTIVE PROTEIN: CPT | Performed by: INTERNAL MEDICINE

## 2018-11-01 PROCEDURE — 25010000002 DAPTOMYCIN PER 1 MG: Performed by: PODIATRIST

## 2018-11-01 PROCEDURE — G0463 HOSPITAL OUTPT CLINIC VISIT: HCPCS

## 2018-11-01 RX ADMIN — DAPTOMYCIN 500 MG: 500 INJECTION, POWDER, LYOPHILIZED, FOR SOLUTION INTRAVENOUS at 16:17

## 2018-11-01 RX ADMIN — ERTAPENEM SODIUM 1 G: 1 INJECTION, POWDER, LYOPHILIZED, FOR SOLUTION INTRAMUSCULAR; INTRAVENOUS at 15:45

## 2018-11-02 ENCOUNTER — HOSPITAL ENCOUNTER (OUTPATIENT)
Dept: INFUSION THERAPY | Facility: HOSPITAL | Age: 33
Setting detail: INFUSION SERIES
Discharge: HOME | End: 2018-11-02
Attending: PODIATRIST

## 2018-11-02 ENCOUNTER — APPOINTMENT (OUTPATIENT)
Dept: INFUSION THERAPY | Facility: HOSPITAL | Age: 33
End: 2018-11-02
Attending: INTERNAL MEDICINE

## 2018-11-02 VITALS
SYSTOLIC BLOOD PRESSURE: 117 MMHG | HEART RATE: 84 BPM | RESPIRATION RATE: 18 BRPM | DIASTOLIC BLOOD PRESSURE: 70 MMHG | TEMPERATURE: 99 F

## 2018-11-02 DIAGNOSIS — E11.621 DIABETIC FOOT ULCER WITH OSTEOMYELITIS (HCC): ICD-10-CM

## 2018-11-02 DIAGNOSIS — E11.69 DIABETIC FOOT ULCER WITH OSTEOMYELITIS (HCC): ICD-10-CM

## 2018-11-02 DIAGNOSIS — M86.9 DIABETIC FOOT ULCER WITH OSTEOMYELITIS (HCC): ICD-10-CM

## 2018-11-02 DIAGNOSIS — L97.509 DIABETIC FOOT ULCER WITH OSTEOMYELITIS (HCC): ICD-10-CM

## 2018-11-02 PROCEDURE — 25010000002 ERTAPENEM 1 GM/100ML SOLUTION: Performed by: PODIATRIST

## 2018-11-02 PROCEDURE — 25010000002 DAPTOMYCIN PER 1 MG: Performed by: PODIATRIST

## 2018-11-02 PROCEDURE — G0463 HOSPITAL OUTPT CLINIC VISIT: HCPCS

## 2018-11-02 PROCEDURE — 97602 WOUND(S) CARE NON-SELECTIVE: CPT

## 2018-11-02 PROCEDURE — 96365 THER/PROPH/DIAG IV INF INIT: CPT

## 2018-11-02 PROCEDURE — 96367 TX/PROPH/DG ADDL SEQ IV INF: CPT

## 2018-11-02 RX ADMIN — ERTAPENEM SODIUM 1 G: 1 INJECTION, POWDER, LYOPHILIZED, FOR SOLUTION INTRAMUSCULAR; INTRAVENOUS at 13:39

## 2018-11-02 RX ADMIN — DAPTOMYCIN 500 MG: 500 INJECTION, POWDER, LYOPHILIZED, FOR SOLUTION INTRAVENOUS at 14:13

## 2018-11-03 ENCOUNTER — HOSPITAL ENCOUNTER (OUTPATIENT)
Dept: INFUSION THERAPY | Facility: HOSPITAL | Age: 33
Setting detail: INFUSION SERIES
Discharge: HOME | End: 2018-11-03
Attending: PODIATRIST

## 2018-11-03 VITALS
SYSTOLIC BLOOD PRESSURE: 135 MMHG | DIASTOLIC BLOOD PRESSURE: 83 MMHG | TEMPERATURE: 98.5 F | RESPIRATION RATE: 18 BRPM | HEART RATE: 100 BPM

## 2018-11-03 DIAGNOSIS — L97.509 DIABETIC FOOT ULCER WITH OSTEOMYELITIS (HCC): ICD-10-CM

## 2018-11-03 DIAGNOSIS — E11.69 DIABETIC FOOT ULCER WITH OSTEOMYELITIS (HCC): ICD-10-CM

## 2018-11-03 DIAGNOSIS — M86.9 DIABETIC FOOT ULCER WITH OSTEOMYELITIS (HCC): ICD-10-CM

## 2018-11-03 DIAGNOSIS — E11.621 DIABETIC FOOT ULCER WITH OSTEOMYELITIS (HCC): ICD-10-CM

## 2018-11-03 PROCEDURE — 25010000002 ERTAPENEM 1 GM/100ML SOLUTION: Performed by: PODIATRIST

## 2018-11-03 PROCEDURE — 97602 WOUND(S) CARE NON-SELECTIVE: CPT

## 2018-11-03 PROCEDURE — 96367 TX/PROPH/DG ADDL SEQ IV INF: CPT

## 2018-11-03 PROCEDURE — 25010000002 DAPTOMYCIN PER 1 MG: Performed by: PODIATRIST

## 2018-11-03 PROCEDURE — G0463 HOSPITAL OUTPT CLINIC VISIT: HCPCS

## 2018-11-03 PROCEDURE — 96365 THER/PROPH/DIAG IV INF INIT: CPT

## 2018-11-03 RX ADMIN — ERTAPENEM SODIUM 1 G: 1 INJECTION, POWDER, LYOPHILIZED, FOR SOLUTION INTRAMUSCULAR; INTRAVENOUS at 10:26

## 2018-11-03 RX ADMIN — DAPTOMYCIN 500 MG: 500 INJECTION, POWDER, LYOPHILIZED, FOR SOLUTION INTRAVENOUS at 09:55

## 2018-11-04 ENCOUNTER — HOSPITAL ENCOUNTER (OUTPATIENT)
Dept: INFUSION THERAPY | Facility: HOSPITAL | Age: 33
Setting detail: INFUSION SERIES
Discharge: HOME | End: 2018-11-04
Attending: PODIATRIST

## 2018-11-04 VITALS
RESPIRATION RATE: 18 BRPM | TEMPERATURE: 98.6 F | DIASTOLIC BLOOD PRESSURE: 62 MMHG | HEART RATE: 99 BPM | SYSTOLIC BLOOD PRESSURE: 100 MMHG

## 2018-11-04 DIAGNOSIS — E11.621 DIABETIC FOOT ULCER WITH OSTEOMYELITIS (HCC): ICD-10-CM

## 2018-11-04 DIAGNOSIS — M86.9 DIABETIC FOOT ULCER WITH OSTEOMYELITIS (HCC): ICD-10-CM

## 2018-11-04 DIAGNOSIS — E11.69 DIABETIC FOOT ULCER WITH OSTEOMYELITIS (HCC): ICD-10-CM

## 2018-11-04 DIAGNOSIS — L97.509 DIABETIC FOOT ULCER WITH OSTEOMYELITIS (HCC): ICD-10-CM

## 2018-11-04 PROCEDURE — 97602 WOUND(S) CARE NON-SELECTIVE: CPT

## 2018-11-04 PROCEDURE — 96365 THER/PROPH/DIAG IV INF INIT: CPT

## 2018-11-04 PROCEDURE — 96367 TX/PROPH/DG ADDL SEQ IV INF: CPT

## 2018-11-04 PROCEDURE — 25010000002 DAPTOMYCIN PER 1 MG: Performed by: PODIATRIST

## 2018-11-04 PROCEDURE — 25010000002 ERTAPENEM 1 GM/100ML SOLUTION: Performed by: PODIATRIST

## 2018-11-04 PROCEDURE — G0463 HOSPITAL OUTPT CLINIC VISIT: HCPCS

## 2018-11-04 RX ADMIN — DAPTOMYCIN 500 MG: 500 INJECTION, POWDER, LYOPHILIZED, FOR SOLUTION INTRAVENOUS at 09:29

## 2018-11-04 RX ADMIN — ERTAPENEM SODIUM 1 G: 1 INJECTION, POWDER, LYOPHILIZED, FOR SOLUTION INTRAMUSCULAR; INTRAVENOUS at 10:02

## 2018-11-05 ENCOUNTER — HOSPITAL ENCOUNTER (OUTPATIENT)
Dept: INFUSION THERAPY | Facility: HOSPITAL | Age: 33
Setting detail: INFUSION SERIES
Discharge: HOME | End: 2018-11-05
Attending: PODIATRIST

## 2018-11-05 ENCOUNTER — APPOINTMENT (OUTPATIENT)
Dept: INFUSION THERAPY | Facility: HOSPITAL | Age: 33
End: 2018-11-05
Attending: INTERNAL MEDICINE

## 2018-11-05 VITALS
TEMPERATURE: 98.3 F | SYSTOLIC BLOOD PRESSURE: 117 MMHG | HEART RATE: 101 BPM | DIASTOLIC BLOOD PRESSURE: 64 MMHG | RESPIRATION RATE: 20 BRPM

## 2018-11-05 DIAGNOSIS — E11.621 DIABETIC FOOT ULCER WITH OSTEOMYELITIS (HCC): ICD-10-CM

## 2018-11-05 DIAGNOSIS — E11.69 DIABETIC FOOT ULCER WITH OSTEOMYELITIS (HCC): ICD-10-CM

## 2018-11-05 DIAGNOSIS — L97.509 DIABETIC FOOT ULCER WITH OSTEOMYELITIS (HCC): ICD-10-CM

## 2018-11-05 DIAGNOSIS — M86.9 DIABETIC FOOT ULCER WITH OSTEOMYELITIS (HCC): ICD-10-CM

## 2018-11-05 PROCEDURE — 25010000002 ERTAPENEM 1 GM/100ML SOLUTION: Performed by: PODIATRIST

## 2018-11-05 PROCEDURE — 97602 WOUND(S) CARE NON-SELECTIVE: CPT

## 2018-11-05 PROCEDURE — G0463 HOSPITAL OUTPT CLINIC VISIT: HCPCS

## 2018-11-05 PROCEDURE — 96367 TX/PROPH/DG ADDL SEQ IV INF: CPT

## 2018-11-05 PROCEDURE — 96365 THER/PROPH/DIAG IV INF INIT: CPT

## 2018-11-05 PROCEDURE — 25010000002 DAPTOMYCIN PER 1 MG: Performed by: PODIATRIST

## 2018-11-05 RX ADMIN — ERTAPENEM SODIUM 1 G: 1 INJECTION, POWDER, LYOPHILIZED, FOR SOLUTION INTRAMUSCULAR; INTRAVENOUS at 15:09

## 2018-11-05 RX ADMIN — DAPTOMYCIN 500 MG: 500 INJECTION, POWDER, LYOPHILIZED, FOR SOLUTION INTRAVENOUS at 14:32

## 2018-11-06 ENCOUNTER — HOSPITAL ENCOUNTER (OUTPATIENT)
Dept: INFUSION THERAPY | Facility: HOSPITAL | Age: 33
Setting detail: INFUSION SERIES
Discharge: HOME | End: 2018-11-06
Attending: PODIATRIST

## 2018-11-06 ENCOUNTER — APPOINTMENT (OUTPATIENT)
Dept: INFUSION THERAPY | Facility: HOSPITAL | Age: 33
End: 2018-11-06
Attending: INTERNAL MEDICINE

## 2018-11-06 DIAGNOSIS — E11.621 DIABETIC FOOT ULCER WITH OSTEOMYELITIS (HCC): ICD-10-CM

## 2018-11-06 DIAGNOSIS — M86.9 DIABETIC FOOT ULCER WITH OSTEOMYELITIS (HCC): ICD-10-CM

## 2018-11-06 DIAGNOSIS — E11.69 DIABETIC FOOT ULCER WITH OSTEOMYELITIS (HCC): ICD-10-CM

## 2018-11-06 DIAGNOSIS — L97.509 DIABETIC FOOT ULCER WITH OSTEOMYELITIS (HCC): ICD-10-CM

## 2018-11-06 PROCEDURE — 25010000002 DAPTOMYCIN PER 1 MG: Performed by: PODIATRIST

## 2018-11-06 PROCEDURE — G0463 HOSPITAL OUTPT CLINIC VISIT: HCPCS

## 2018-11-06 PROCEDURE — 25010000002 ERTAPENEM 1 GM/100ML SOLUTION: Performed by: PODIATRIST

## 2018-11-06 PROCEDURE — 96365 THER/PROPH/DIAG IV INF INIT: CPT

## 2018-11-06 PROCEDURE — 97602 WOUND(S) CARE NON-SELECTIVE: CPT

## 2018-11-06 PROCEDURE — 96367 TX/PROPH/DG ADDL SEQ IV INF: CPT

## 2018-11-06 RX ADMIN — ERTAPENEM SODIUM 1 G: 1 INJECTION, POWDER, LYOPHILIZED, FOR SOLUTION INTRAMUSCULAR; INTRAVENOUS at 15:32

## 2018-11-06 RX ADMIN — DAPTOMYCIN 500 MG: 500 INJECTION, POWDER, LYOPHILIZED, FOR SOLUTION INTRAVENOUS at 15:03

## 2018-11-07 ENCOUNTER — HOSPITAL ENCOUNTER (OUTPATIENT)
Dept: INFUSION THERAPY | Facility: HOSPITAL | Age: 33
Setting detail: INFUSION SERIES
Discharge: HOME | End: 2018-11-07
Attending: PODIATRIST

## 2018-11-07 VITALS
DIASTOLIC BLOOD PRESSURE: 80 MMHG | HEART RATE: 95 BPM | SYSTOLIC BLOOD PRESSURE: 133 MMHG | TEMPERATURE: 98.2 F | RESPIRATION RATE: 16 BRPM

## 2018-11-07 DIAGNOSIS — M86.9 DIABETIC FOOT ULCER WITH OSTEOMYELITIS (HCC): ICD-10-CM

## 2018-11-07 DIAGNOSIS — L97.509 DIABETIC FOOT ULCER WITH OSTEOMYELITIS (HCC): ICD-10-CM

## 2018-11-07 DIAGNOSIS — E11.621 DIABETIC FOOT ULCER WITH OSTEOMYELITIS (HCC): ICD-10-CM

## 2018-11-07 DIAGNOSIS — E11.69 DIABETIC FOOT ULCER WITH OSTEOMYELITIS (HCC): ICD-10-CM

## 2018-11-07 PROCEDURE — 97602 WOUND(S) CARE NON-SELECTIVE: CPT

## 2018-11-07 PROCEDURE — 25010000002 DAPTOMYCIN PER 1 MG: Performed by: PODIATRIST

## 2018-11-07 PROCEDURE — G0463 HOSPITAL OUTPT CLINIC VISIT: HCPCS

## 2018-11-07 PROCEDURE — 96365 THER/PROPH/DIAG IV INF INIT: CPT

## 2018-11-07 PROCEDURE — 96367 TX/PROPH/DG ADDL SEQ IV INF: CPT

## 2018-11-07 PROCEDURE — 25010000002 ERTAPENEM 1 GM/100ML SOLUTION: Performed by: PODIATRIST

## 2018-11-07 RX ADMIN — DAPTOMYCIN 500 MG: 500 INJECTION, POWDER, LYOPHILIZED, FOR SOLUTION INTRAVENOUS at 16:55

## 2018-11-07 RX ADMIN — ERTAPENEM SODIUM 1 G: 1 INJECTION, POWDER, LYOPHILIZED, FOR SOLUTION INTRAMUSCULAR; INTRAVENOUS at 17:19

## 2018-11-08 ENCOUNTER — HOSPITAL ENCOUNTER (OUTPATIENT)
Dept: INFUSION THERAPY | Facility: HOSPITAL | Age: 33
Setting detail: INFUSION SERIES
Discharge: HOME | End: 2018-11-08
Attending: INTERNAL MEDICINE

## 2018-11-08 ENCOUNTER — HOSPITAL ENCOUNTER (OUTPATIENT)
Dept: INFUSION THERAPY | Facility: HOSPITAL | Age: 33
Setting detail: INFUSION SERIES
Discharge: HOME | End: 2018-11-08
Attending: PODIATRIST

## 2018-11-08 ENCOUNTER — HOSPITAL ENCOUNTER (OUTPATIENT)
Dept: INFUSION THERAPY | Facility: HOSPITAL | Age: 33
Setting detail: INFUSION SERIES
End: 2018-11-08
Attending: PODIATRIST

## 2018-11-08 VITALS
SYSTOLIC BLOOD PRESSURE: 125 MMHG | TEMPERATURE: 98.8 F | RESPIRATION RATE: 18 BRPM | HEART RATE: 95 BPM | DIASTOLIC BLOOD PRESSURE: 69 MMHG

## 2018-11-08 DIAGNOSIS — E11.621 DIABETIC FOOT ULCER WITH OSTEOMYELITIS (HCC): ICD-10-CM

## 2018-11-08 DIAGNOSIS — L97.509 DIABETIC FOOT ULCER WITH OSTEOMYELITIS (HCC): ICD-10-CM

## 2018-11-08 DIAGNOSIS — M86.9 DIABETIC FOOT ULCER WITH OSTEOMYELITIS (HCC): ICD-10-CM

## 2018-11-08 DIAGNOSIS — E11.69 DIABETIC FOOT ULCER WITH OSTEOMYELITIS (HCC): ICD-10-CM

## 2018-11-08 LAB
ANION GAP SERPL CALCULATED.3IONS-SCNC: 6.7 MMOL/L (ref 3.6–11.2)
BUN BLD-MCNC: 14 MG/DL (ref 7–21)
BUN/CREAT SERPL: 20.9 (ref 7–25)
CALCIUM SPEC-SCNC: 9.1 MG/DL (ref 7.7–10)
CHLORIDE SERPL-SCNC: 105 MMOL/L (ref 99–112)
CK SERPL-CCNC: 100 U/L (ref 24–173)
CO2 SERPL-SCNC: 26.3 MMOL/L (ref 24.3–31.9)
CREAT BLD-MCNC: 0.67 MG/DL (ref 0.43–1.29)
CRP SERPL-MCNC: 0.96 MG/DL (ref 0–0.99)
GFR SERPL CREATININE-BSD FRML MDRD: 100 ML/MIN/1.73
GLUCOSE BLD-MCNC: 160 MG/DL (ref 70–110)
OSMOLALITY SERPL CALC.SUM OF ELEC: 279.6 MOSM/KG (ref 273–305)
POTASSIUM BLD-SCNC: 3.7 MMOL/L (ref 3.5–5.3)
SODIUM BLD-SCNC: 138 MMOL/L (ref 135–153)

## 2018-11-08 PROCEDURE — 96365 THER/PROPH/DIAG IV INF INIT: CPT

## 2018-11-08 PROCEDURE — 25010000002 ERTAPENEM 1 GM/100ML SOLUTION: Performed by: PODIATRIST

## 2018-11-08 PROCEDURE — 25010000002 DAPTOMYCIN PER 1 MG: Performed by: PODIATRIST

## 2018-11-08 PROCEDURE — 36592 COLLECT BLOOD FROM PICC: CPT

## 2018-11-08 PROCEDURE — 82550 ASSAY OF CK (CPK): CPT | Performed by: INTERNAL MEDICINE

## 2018-11-08 PROCEDURE — 86140 C-REACTIVE PROTEIN: CPT | Performed by: INTERNAL MEDICINE

## 2018-11-08 PROCEDURE — 96367 TX/PROPH/DG ADDL SEQ IV INF: CPT

## 2018-11-08 PROCEDURE — 80048 BASIC METABOLIC PNL TOTAL CA: CPT | Performed by: INTERNAL MEDICINE

## 2018-11-08 RX ADMIN — ERTAPENEM SODIUM 1 G: 1 INJECTION, POWDER, LYOPHILIZED, FOR SOLUTION INTRAMUSCULAR; INTRAVENOUS at 15:43

## 2018-11-08 RX ADMIN — DAPTOMYCIN 500 MG: 500 INJECTION, POWDER, LYOPHILIZED, FOR SOLUTION INTRAVENOUS at 15:09

## 2018-11-09 ENCOUNTER — HOSPITAL ENCOUNTER (OUTPATIENT)
Dept: INFUSION THERAPY | Facility: HOSPITAL | Age: 33
Setting detail: INFUSION SERIES
Discharge: HOME | End: 2018-11-09
Attending: PODIATRIST

## 2018-11-09 VITALS
TEMPERATURE: 98.3 F | RESPIRATION RATE: 17 BRPM | DIASTOLIC BLOOD PRESSURE: 76 MMHG | SYSTOLIC BLOOD PRESSURE: 121 MMHG | HEART RATE: 88 BPM

## 2018-11-09 DIAGNOSIS — E11.621 DIABETIC FOOT ULCER WITH OSTEOMYELITIS (HCC): ICD-10-CM

## 2018-11-09 DIAGNOSIS — M86.9 DIABETIC FOOT ULCER WITH OSTEOMYELITIS (HCC): ICD-10-CM

## 2018-11-09 DIAGNOSIS — L97.509 DIABETIC FOOT ULCER WITH OSTEOMYELITIS (HCC): ICD-10-CM

## 2018-11-09 DIAGNOSIS — L03.032 CELLULITIS OF TOE OF LEFT FOOT: ICD-10-CM

## 2018-11-09 DIAGNOSIS — E11.69 DIABETIC FOOT ULCER WITH OSTEOMYELITIS (HCC): ICD-10-CM

## 2018-11-09 PROCEDURE — 25010000002 DAPTOMYCIN PER 1 MG: Performed by: PODIATRIST

## 2018-11-09 PROCEDURE — 25010000002 ERTAPENEM 1 GM/100ML SOLUTION: Performed by: PODIATRIST

## 2018-11-09 PROCEDURE — 97602 WOUND(S) CARE NON-SELECTIVE: CPT

## 2018-11-09 PROCEDURE — 96367 TX/PROPH/DG ADDL SEQ IV INF: CPT

## 2018-11-09 PROCEDURE — 96365 THER/PROPH/DIAG IV INF INIT: CPT

## 2018-11-09 RX ADMIN — ERTAPENEM SODIUM 1 G: 1 INJECTION, POWDER, LYOPHILIZED, FOR SOLUTION INTRAMUSCULAR; INTRAVENOUS at 16:26

## 2018-11-09 RX ADMIN — DAPTOMYCIN 500 MG: 500 INJECTION, POWDER, LYOPHILIZED, FOR SOLUTION INTRAVENOUS at 15:51

## 2018-11-10 ENCOUNTER — HOSPITAL ENCOUNTER (OUTPATIENT)
Dept: INFUSION THERAPY | Facility: HOSPITAL | Age: 33
Setting detail: INFUSION SERIES
Discharge: HOME | End: 2018-11-10
Attending: PODIATRIST

## 2018-11-10 VITALS
RESPIRATION RATE: 18 BRPM | HEART RATE: 94 BPM | DIASTOLIC BLOOD PRESSURE: 75 MMHG | TEMPERATURE: 98 F | SYSTOLIC BLOOD PRESSURE: 149 MMHG

## 2018-11-10 DIAGNOSIS — M86.9 DIABETIC FOOT ULCER WITH OSTEOMYELITIS (HCC): Primary | ICD-10-CM

## 2018-11-10 DIAGNOSIS — E11.621 DIABETIC FOOT ULCER WITH OSTEOMYELITIS (HCC): Primary | ICD-10-CM

## 2018-11-10 DIAGNOSIS — L97.509 DIABETIC FOOT ULCER WITH OSTEOMYELITIS (HCC): Primary | ICD-10-CM

## 2018-11-10 DIAGNOSIS — E11.69 DIABETIC FOOT ULCER WITH OSTEOMYELITIS (HCC): Primary | ICD-10-CM

## 2018-11-10 PROCEDURE — G0463 HOSPITAL OUTPT CLINIC VISIT: HCPCS

## 2018-11-10 PROCEDURE — 97602 WOUND(S) CARE NON-SELECTIVE: CPT

## 2018-11-10 PROCEDURE — 25010000002 DAPTOMYCIN PER 1 MG: Performed by: PODIATRIST

## 2018-11-10 PROCEDURE — 96367 TX/PROPH/DG ADDL SEQ IV INF: CPT

## 2018-11-10 PROCEDURE — 96365 THER/PROPH/DIAG IV INF INIT: CPT

## 2018-11-10 PROCEDURE — 25010000002 ERTAPENEM 1 GM/100ML SOLUTION: Performed by: PODIATRIST

## 2018-11-10 RX ADMIN — DAPTOMYCIN 500 MG: 500 INJECTION, POWDER, LYOPHILIZED, FOR SOLUTION INTRAVENOUS at 10:47

## 2018-11-10 RX ADMIN — ERTAPENEM SODIUM 1 G: 1 INJECTION, POWDER, LYOPHILIZED, FOR SOLUTION INTRAMUSCULAR; INTRAVENOUS at 10:12

## 2018-11-11 ENCOUNTER — HOSPITAL ENCOUNTER (OUTPATIENT)
Dept: INFUSION THERAPY | Facility: HOSPITAL | Age: 33
Setting detail: INFUSION SERIES
Discharge: HOME | End: 2018-11-11
Attending: PODIATRIST

## 2018-11-11 VITALS
HEART RATE: 96 BPM | SYSTOLIC BLOOD PRESSURE: 112 MMHG | TEMPERATURE: 98.6 F | RESPIRATION RATE: 18 BRPM | DIASTOLIC BLOOD PRESSURE: 72 MMHG

## 2018-11-11 DIAGNOSIS — L97.509 DIABETIC FOOT ULCER WITH OSTEOMYELITIS (HCC): Primary | ICD-10-CM

## 2018-11-11 DIAGNOSIS — E11.621 DIABETIC FOOT ULCER WITH OSTEOMYELITIS (HCC): Primary | ICD-10-CM

## 2018-11-11 DIAGNOSIS — M86.9 DIABETIC FOOT ULCER WITH OSTEOMYELITIS (HCC): Primary | ICD-10-CM

## 2018-11-11 DIAGNOSIS — E11.69 DIABETIC FOOT ULCER WITH OSTEOMYELITIS (HCC): Primary | ICD-10-CM

## 2018-11-11 PROCEDURE — 96365 THER/PROPH/DIAG IV INF INIT: CPT

## 2018-11-11 PROCEDURE — 97602 WOUND(S) CARE NON-SELECTIVE: CPT

## 2018-11-11 PROCEDURE — 25010000002 DAPTOMYCIN PER 1 MG: Performed by: PODIATRIST

## 2018-11-11 PROCEDURE — 96367 TX/PROPH/DG ADDL SEQ IV INF: CPT

## 2018-11-11 PROCEDURE — 25010000002 ERTAPENEM 1 GM/100ML SOLUTION: Performed by: PODIATRIST

## 2018-11-11 PROCEDURE — G0463 HOSPITAL OUTPT CLINIC VISIT: HCPCS

## 2018-11-11 RX ADMIN — DAPTOMYCIN 500 MG: 500 INJECTION, POWDER, LYOPHILIZED, FOR SOLUTION INTRAVENOUS at 09:48

## 2018-11-11 RX ADMIN — ERTAPENEM SODIUM 1 G: 1 INJECTION, POWDER, LYOPHILIZED, FOR SOLUTION INTRAMUSCULAR; INTRAVENOUS at 10:28

## 2018-11-12 ENCOUNTER — HOSPITAL ENCOUNTER (OUTPATIENT)
Dept: INFUSION THERAPY | Facility: HOSPITAL | Age: 33
Setting detail: INFUSION SERIES
Discharge: HOME | End: 2018-11-12
Attending: PODIATRIST

## 2018-11-12 VITALS
TEMPERATURE: 98.6 F | HEART RATE: 98 BPM | SYSTOLIC BLOOD PRESSURE: 110 MMHG | DIASTOLIC BLOOD PRESSURE: 69 MMHG | RESPIRATION RATE: 18 BRPM

## 2018-11-12 DIAGNOSIS — L97.509 DIABETIC FOOT ULCER WITH OSTEOMYELITIS (HCC): Primary | ICD-10-CM

## 2018-11-12 DIAGNOSIS — L97.509 DIABETIC FOOT ULCER WITH OSTEOMYELITIS (HCC): ICD-10-CM

## 2018-11-12 DIAGNOSIS — E11.69 DIABETIC FOOT ULCER WITH OSTEOMYELITIS (HCC): Primary | ICD-10-CM

## 2018-11-12 DIAGNOSIS — M86.9 DIABETIC FOOT ULCER WITH OSTEOMYELITIS (HCC): ICD-10-CM

## 2018-11-12 DIAGNOSIS — E11.621 DIABETIC FOOT ULCER WITH OSTEOMYELITIS (HCC): ICD-10-CM

## 2018-11-12 DIAGNOSIS — M86.9 DIABETIC FOOT ULCER WITH OSTEOMYELITIS (HCC): Primary | ICD-10-CM

## 2018-11-12 DIAGNOSIS — E11.621 DIABETIC FOOT ULCER WITH OSTEOMYELITIS (HCC): Primary | ICD-10-CM

## 2018-11-12 DIAGNOSIS — E11.69 DIABETIC FOOT ULCER WITH OSTEOMYELITIS (HCC): ICD-10-CM

## 2018-11-12 PROCEDURE — 96374 THER/PROPH/DIAG INJ IV PUSH: CPT

## 2018-11-12 PROCEDURE — 96365 THER/PROPH/DIAG IV INF INIT: CPT

## 2018-11-12 PROCEDURE — 25010000002 ERTAPENEM 1 GM/100ML SOLUTION: Performed by: PODIATRIST

## 2018-11-12 PROCEDURE — 96367 TX/PROPH/DG ADDL SEQ IV INF: CPT

## 2018-11-12 PROCEDURE — 25010000002 DAPTOMYCIN PER 1 MG: Performed by: PODIATRIST

## 2018-11-12 PROCEDURE — G0463 HOSPITAL OUTPT CLINIC VISIT: HCPCS

## 2018-11-12 RX ADMIN — ERTAPENEM SODIUM 1 G: 1 INJECTION, POWDER, LYOPHILIZED, FOR SOLUTION INTRAMUSCULAR; INTRAVENOUS at 15:34

## 2018-11-12 RX ADMIN — DAPTOMYCIN 500 MG: 500 INJECTION, POWDER, LYOPHILIZED, FOR SOLUTION INTRAVENOUS at 16:05

## 2018-11-12 NOTE — ADDENDUM NOTE
Encounter addended by: Dana Jennings RN on: 11/12/2018 4:40 PM   Actions taken: Actions taken from a BestPractice Advisory, MAR administration accepted

## 2018-11-12 NOTE — ADDENDUM NOTE
Encounter addended by: Dana Jennings RN on: 11/12/2018 4:30 PM   Actions taken: Actions taken from a BestPractice Advisory

## 2018-11-12 NOTE — ADDENDUM NOTE
Encounter addended by: Dana Jennings RN on: 11/12/2018 4:48 PM   Actions taken: MAR administration edited, MAR administration accepted

## 2018-11-13 ENCOUNTER — HOSPITAL ENCOUNTER (OUTPATIENT)
Dept: INFUSION THERAPY | Facility: HOSPITAL | Age: 33
Setting detail: INFUSION SERIES
Discharge: HOME | End: 2018-11-13
Attending: PODIATRIST

## 2018-11-13 VITALS
SYSTOLIC BLOOD PRESSURE: 152 MMHG | TEMPERATURE: 98.2 F | HEART RATE: 89 BPM | RESPIRATION RATE: 17 BRPM | DIASTOLIC BLOOD PRESSURE: 88 MMHG

## 2018-11-13 VITALS — DIASTOLIC BLOOD PRESSURE: 72 MMHG | RESPIRATION RATE: 17 BRPM | HEART RATE: 67 BPM | SYSTOLIC BLOOD PRESSURE: 155 MMHG

## 2018-11-13 DIAGNOSIS — M86.9 DIABETIC FOOT ULCER WITH OSTEOMYELITIS (HCC): Primary | ICD-10-CM

## 2018-11-13 DIAGNOSIS — E11.621 DIABETIC FOOT ULCER WITH OSTEOMYELITIS (HCC): Primary | ICD-10-CM

## 2018-11-13 DIAGNOSIS — E11.69 DIABETIC FOOT ULCER WITH OSTEOMYELITIS (HCC): Primary | ICD-10-CM

## 2018-11-13 DIAGNOSIS — L97.509 DIABETIC FOOT ULCER WITH OSTEOMYELITIS (HCC): Primary | ICD-10-CM

## 2018-11-13 PROCEDURE — 96368 THER/DIAG CONCURRENT INF: CPT

## 2018-11-13 PROCEDURE — G0463 HOSPITAL OUTPT CLINIC VISIT: HCPCS

## 2018-11-13 PROCEDURE — 97602 WOUND(S) CARE NON-SELECTIVE: CPT

## 2018-11-13 PROCEDURE — 25010000002 DAPTOMYCIN PER 1 MG: Performed by: PODIATRIST

## 2018-11-13 PROCEDURE — 96365 THER/PROPH/DIAG IV INF INIT: CPT

## 2018-11-13 PROCEDURE — 96367 TX/PROPH/DG ADDL SEQ IV INF: CPT

## 2018-11-13 PROCEDURE — 25010000002 ERTAPENEM 1 GM/100ML SOLUTION: Performed by: PODIATRIST

## 2018-11-13 RX ADMIN — ERTAPENEM SODIUM 1 G: 1 INJECTION, POWDER, LYOPHILIZED, FOR SOLUTION INTRAMUSCULAR; INTRAVENOUS at 16:02

## 2018-11-13 RX ADMIN — DAPTOMYCIN 500 MG: 500 INJECTION, POWDER, LYOPHILIZED, FOR SOLUTION INTRAVENOUS at 15:20

## 2018-11-14 ENCOUNTER — HOSPITAL ENCOUNTER (OUTPATIENT)
Dept: INFUSION THERAPY | Facility: HOSPITAL | Age: 33
Setting detail: INFUSION SERIES
Discharge: HOME | End: 2018-11-14
Attending: PODIATRIST

## 2018-11-14 ENCOUNTER — APPOINTMENT (OUTPATIENT)
Dept: INFUSION THERAPY | Facility: HOSPITAL | Age: 33
End: 2018-11-14
Attending: PODIATRIST

## 2018-11-14 DIAGNOSIS — E11.621 DIABETIC FOOT ULCER WITH OSTEOMYELITIS (HCC): Primary | ICD-10-CM

## 2018-11-14 DIAGNOSIS — M86.9 DIABETIC FOOT ULCER WITH OSTEOMYELITIS (HCC): Primary | ICD-10-CM

## 2018-11-14 DIAGNOSIS — E11.69 DIABETIC FOOT ULCER WITH OSTEOMYELITIS (HCC): Primary | ICD-10-CM

## 2018-11-14 DIAGNOSIS — L97.509 DIABETIC FOOT ULCER WITH OSTEOMYELITIS (HCC): Primary | ICD-10-CM

## 2018-11-15 ENCOUNTER — APPOINTMENT (OUTPATIENT)
Dept: INFUSION THERAPY | Facility: HOSPITAL | Age: 33
End: 2018-11-15
Attending: PODIATRIST

## 2018-11-15 ENCOUNTER — HOSPITAL ENCOUNTER (OUTPATIENT)
Dept: INFUSION THERAPY | Facility: HOSPITAL | Age: 33
Setting detail: INFUSION SERIES
Discharge: HOME | End: 2018-11-15
Attending: INTERNAL MEDICINE

## 2018-11-15 ENCOUNTER — HOSPITAL ENCOUNTER (OUTPATIENT)
Dept: INFUSION THERAPY | Facility: HOSPITAL | Age: 33
Setting detail: INFUSION SERIES
Discharge: HOME | End: 2018-11-15
Attending: PODIATRIST

## 2018-11-15 VITALS
HEART RATE: 72 BPM | SYSTOLIC BLOOD PRESSURE: 148 MMHG | TEMPERATURE: 98.1 F | RESPIRATION RATE: 20 BRPM | DIASTOLIC BLOOD PRESSURE: 68 MMHG

## 2018-11-15 DIAGNOSIS — L97.509 DIABETIC FOOT ULCER WITH OSTEOMYELITIS (HCC): Primary | ICD-10-CM

## 2018-11-15 DIAGNOSIS — M86.9 DIABETIC FOOT ULCER WITH OSTEOMYELITIS (HCC): Primary | ICD-10-CM

## 2018-11-15 DIAGNOSIS — E11.69 DIABETIC FOOT ULCER WITH OSTEOMYELITIS (HCC): Primary | ICD-10-CM

## 2018-11-15 DIAGNOSIS — E11.621 DIABETIC FOOT ULCER WITH OSTEOMYELITIS (HCC): Primary | ICD-10-CM

## 2018-11-15 LAB
ANION GAP SERPL CALCULATED.3IONS-SCNC: 5.4 MMOL/L (ref 3.6–11.2)
BUN BLD-MCNC: 9 MG/DL (ref 7–21)
BUN/CREAT SERPL: 14.3 (ref 7–25)
CALCIUM SPEC-SCNC: 8.6 MG/DL (ref 7.7–10)
CHLORIDE SERPL-SCNC: 106 MMOL/L (ref 99–112)
CK SERPL-CCNC: 68 U/L (ref 24–173)
CO2 SERPL-SCNC: 28.6 MMOL/L (ref 24.3–31.9)
CREAT BLD-MCNC: 0.63 MG/DL (ref 0.43–1.29)
CRP SERPL-MCNC: <0.5 MG/DL (ref 0–0.99)
GFR SERPL CREATININE-BSD FRML MDRD: 108 ML/MIN/1.73
GLUCOSE BLD-MCNC: 252 MG/DL (ref 70–110)
OSMOLALITY SERPL CALC.SUM OF ELEC: 286.6 MOSM/KG (ref 273–305)
POTASSIUM BLD-SCNC: 3.9 MMOL/L (ref 3.5–5.3)
SODIUM BLD-SCNC: 140 MMOL/L (ref 135–153)

## 2018-11-15 PROCEDURE — 97602 WOUND(S) CARE NON-SELECTIVE: CPT

## 2018-11-15 PROCEDURE — 86140 C-REACTIVE PROTEIN: CPT | Performed by: INTERNAL MEDICINE

## 2018-11-15 PROCEDURE — 25010000002 DAPTOMYCIN PER 1 MG: Performed by: PODIATRIST

## 2018-11-15 PROCEDURE — 80048 BASIC METABOLIC PNL TOTAL CA: CPT | Performed by: INTERNAL MEDICINE

## 2018-11-15 PROCEDURE — 96365 THER/PROPH/DIAG IV INF INIT: CPT

## 2018-11-15 PROCEDURE — 96367 TX/PROPH/DG ADDL SEQ IV INF: CPT

## 2018-11-15 PROCEDURE — 25010000002 ERTAPENEM 1 GM/100ML SOLUTION: Performed by: PODIATRIST

## 2018-11-15 PROCEDURE — 82550 ASSAY OF CK (CPK): CPT | Performed by: INTERNAL MEDICINE

## 2018-11-15 RX ADMIN — DAPTOMYCIN 500 MG: 500 INJECTION, POWDER, LYOPHILIZED, FOR SOLUTION INTRAVENOUS at 11:54

## 2018-11-15 RX ADMIN — ERTAPENEM SODIUM 1 G: 1 INJECTION, POWDER, LYOPHILIZED, FOR SOLUTION INTRAMUSCULAR; INTRAVENOUS at 12:57

## 2018-11-15 NOTE — PATIENT INSTRUCTIONS
Daptomycin injection  What is this medicine?  DAPTOMYCIN (DAP toe MYE sin) is a lipopeptide antibiotic. It is used to treat certain kinds of bacterial infections. It will not work for colds, flu, or other viral infections.  This medicine may be used for other purposes; ask your health care provider or pharmacist if you have questions.  COMMON BRAND NAME(S): Zia Lizarraga  What should I tell my health care provider before I take this medicine?  They need to know if you have any of these conditions:  -kidney disease  -an unusual or allergic reaction to daptomycin, other medicines, foods, dyes, or preservatives  -pregnant or trying to get pregnant  -breast-feeding  How should I use this medicine?  This medicine is for infusion into a vein. It is usually given by a health care professional in a hospital or clinic setting.  If you get this medicine at home, you will be taught how to prepare and give this medicine. Use exactly as directed. Take your medicine at regular intervals. Do not take your medicine more often than directed. Take all of your medicine as directed even if you think you are better. Do not skip doses or stop your medicine early.  It is important that you put your used needles and syringes in a special sharps container. Do not put them in a trash can. If you do not have a sharps container, call your pharmacist or healthcare provider to get one.  Talk to your pediatrician regarding the use of this medicine in children. While this drug may be prescribed for children as young as 1 year for selected conditions, precautions do apply.  Overdosage: If you think you have taken too much of this medicine contact a poison control center or emergency room at once.  NOTE: This medicine is only for you. Do not share this medicine with others.  What if I miss a dose?  If you miss a dose, take it as soon as you can. If it is almost time for your next dose, take only that dose. Do not take double or extra  doses.  What may interact with this medicine?  -birth control pills  -some antibiotics like tobramycin  This list may not describe all possible interactions. Give your health care provider a list of all the medicines, herbs, non-prescription drugs, or dietary supplements you use. Also tell them if you smoke, drink alcohol, or use illegal drugs. Some items may interact with your medicine.  What should I watch for while using this medicine?  Your condition will be monitored carefully while you are receiving this medicine.  Do not treat diarrhea with over the counter products. Contact your doctor if you have diarrhea that lasts more than 2 days or if it is severe and watery.  What side effects may I notice from receiving this medicine?  Side effects that you should report to your doctor or health care professional as soon as possible:  -allergic reactions like skin rash, itching or hives, swelling of the face, lips, or tongue  -breathing problems  -fever, infection  -high or low blood pressure  -muscle pain  -numb or tingling pain  -trouble passing urine or change in the amount of urine  -unusually tired or weak  -vomiting  Side effects that usually do not require medical attention (report to your doctor or health care professional if they continue or are bothersome):  -constipation or diarrhea  -trouble sleeping  -headache  -nausea  -stomach upset  This list may not describe all possible side effects. Call your doctor for medical advice about side effects. You may report side effects to FDA at 1-876-FDA-2655.  Where should I keep my medicine?  Keep out of the reach of children.  If you are using this medicine at home, you will be instructed on how to store this medicine. Throw away any unused medicine after the expiration date on the label.  NOTE: This sheet is a summary. It may not cover all possible information. If you have questions about this medicine, talk to your doctor, pharmacist, or health care provider.  ©  2018 [employer] (2017-03-31 11:21:16)  Ertapenem injection  What is this medicine?  ERTAPENEM (er ta PEN em) is a carbapenem antibiotic. It is used to treat certain kinds of bacterial infections. It will not work for colds, flu, or other viral infections.  This medicine may be used for other purposes; ask your health care provider or pharmacist if you have questions.  COMMON BRAND NAME(S): Invanz  What should I tell my health care provider before I take this medicine?  They need to know if you have any of these conditions:  -brain tumor, lesion  -kidney disease  -seizure disorder  -an unusual or allergic reaction to ertapenem, other antibiotics, amide local anesthetics like lidocaine, or other medicines, foods, dyes, or preservatives  -pregnant or trying to get pregnant  -breast-feeding  How should I use this medicine?  This medicine is infused into a vein or injected deep into a muscle. It is usually given by a health care professional in a hospital or clinic setting.  If you get this medicine at home, you will be taught how to prepare and give this medicine. Use exactly as directed. Take your medicine at regular intervals. Do not take your medicine more often than directed.  It is important that you put your used needles and syringes in a special sharps container. Do not put them in a trash can. If you do not have a sharps container, call your pharmacist or healthcare provider to get one.  Talk to your pediatrician regarding the use of this medicine in children. While this drug may be prescribed for children as young as 3 months old for selected conditions, precautions do apply.  Overdosage: If you think you have taken too much of this medicine contact a poison control center or emergency room at once.  NOTE: This medicine is only for you. Do not share this medicine with others.  What if I miss a dose?  If you miss a dose, take it as soon as you can. If it is almost time for your next dose, take only  that dose. Do not take double or extra doses.  What may interact with this medicine?  -birth control pills  -probenecid  This list may not describe all possible interactions. Give your health care provider a list of all the medicines, herbs, non-prescription drugs, or dietary supplements you use. Also tell them if you smoke, drink alcohol, or use illegal drugs. Some items may interact with your medicine.  What should I watch for while using this medicine?  Tell your doctor or health care professional if your symptoms do not improve or if you get new symptoms. Your doctor will monitor your condition and blood work as needed.  Do not treat diarrhea with over the counter products. Contact your doctor if you have diarrhea that lasts more than 2 days or if it is severe and watery.  What side effects may I notice from receiving this medicine?  Side effects that you should report to your doctor or health care professional as soon as possible:  -allergic reactions like skin rash, itching or hives, swelling of the face, lips, or tongue  -anxiety, confusion, dizzy  -chest pain  -difficulty breathing, wheezing  -edema, swelling  -fever  -irregular heart rate, blood pressure  -pain or difficulty passing urine  -seizures  -unusually weak or tired  -white or red patches in mouth  Side effects that usually do not require medical attention (report to your doctor or health care professional if they continue or are bothersome):  -constipation or diarrhea  -difficulty sleeping  -headache  -nausea, vomiting  -pain, swelling or irritation where injected  -stomach upset  -vaginal itch, irritation  This list may not describe all possible side effects. Call your doctor for medical advice about side effects. You may report side effects to FDA at 2-947-FDA-0832.  Where should I keep my medicine?  Keep out of the reach of children.  You will be instructed on how to store this medicine. Throw away any unused medicine after the expiration date  on the label.  NOTE: This sheet is a summary. It may not cover all possible information. If you have questions about this medicine, talk to your doctor, pharmacist, or health care provider.  © 2018 [employer] Standard (2014-07-25 07:36:44)

## 2018-11-16 ENCOUNTER — HOSPITAL ENCOUNTER (OUTPATIENT)
Dept: INFUSION THERAPY | Facility: HOSPITAL | Age: 33
Setting detail: INFUSION SERIES
Discharge: HOME | End: 2018-11-16
Attending: PODIATRIST

## 2018-11-16 VITALS
SYSTOLIC BLOOD PRESSURE: 104 MMHG | HEART RATE: 97 BPM | DIASTOLIC BLOOD PRESSURE: 65 MMHG | TEMPERATURE: 98.2 F | RESPIRATION RATE: 16 BRPM

## 2018-11-16 DIAGNOSIS — E11.621 DIABETIC FOOT ULCER WITH OSTEOMYELITIS (HCC): Primary | ICD-10-CM

## 2018-11-16 DIAGNOSIS — M86.9 DIABETIC FOOT ULCER WITH OSTEOMYELITIS (HCC): Primary | ICD-10-CM

## 2018-11-16 DIAGNOSIS — E11.69 DIABETIC FOOT ULCER WITH OSTEOMYELITIS (HCC): Primary | ICD-10-CM

## 2018-11-16 DIAGNOSIS — L97.509 DIABETIC FOOT ULCER WITH OSTEOMYELITIS (HCC): Primary | ICD-10-CM

## 2018-11-16 PROCEDURE — G0463 HOSPITAL OUTPT CLINIC VISIT: HCPCS

## 2018-11-16 PROCEDURE — 25010000002 ERTAPENEM 1 GM/100ML SOLUTION: Performed by: PODIATRIST

## 2018-11-16 PROCEDURE — 25010000002 DAPTOMYCIN PER 1 MG: Performed by: PODIATRIST

## 2018-11-16 PROCEDURE — 96367 TX/PROPH/DG ADDL SEQ IV INF: CPT

## 2018-11-16 PROCEDURE — 96365 THER/PROPH/DIAG IV INF INIT: CPT

## 2018-11-16 RX ADMIN — ERTAPENEM SODIUM 1 G: 1 INJECTION, POWDER, LYOPHILIZED, FOR SOLUTION INTRAMUSCULAR; INTRAVENOUS at 15:23

## 2018-11-16 RX ADMIN — DAPTOMYCIN 500 MG: 500 INJECTION, POWDER, LYOPHILIZED, FOR SOLUTION INTRAVENOUS at 14:49

## 2018-11-17 ENCOUNTER — HOSPITAL ENCOUNTER (OUTPATIENT)
Dept: INFUSION THERAPY | Facility: HOSPITAL | Age: 33
Setting detail: INFUSION SERIES
Discharge: HOME | End: 2018-11-17
Attending: PODIATRIST

## 2018-11-17 VITALS
DIASTOLIC BLOOD PRESSURE: 83 MMHG | SYSTOLIC BLOOD PRESSURE: 124 MMHG | TEMPERATURE: 98.5 F | RESPIRATION RATE: 18 BRPM | HEART RATE: 104 BPM

## 2018-11-17 DIAGNOSIS — E11.69 DIABETIC FOOT ULCER WITH OSTEOMYELITIS (HCC): Primary | ICD-10-CM

## 2018-11-17 DIAGNOSIS — M86.9 DIABETIC FOOT ULCER WITH OSTEOMYELITIS (HCC): Primary | ICD-10-CM

## 2018-11-17 DIAGNOSIS — L97.509 DIABETIC FOOT ULCER WITH OSTEOMYELITIS (HCC): Primary | ICD-10-CM

## 2018-11-17 DIAGNOSIS — E11.621 DIABETIC FOOT ULCER WITH OSTEOMYELITIS (HCC): Primary | ICD-10-CM

## 2018-11-17 PROCEDURE — G0463 HOSPITAL OUTPT CLINIC VISIT: HCPCS

## 2018-11-17 PROCEDURE — 96365 THER/PROPH/DIAG IV INF INIT: CPT

## 2018-11-17 PROCEDURE — 25010000002 ERTAPENEM 1 GM/100ML SOLUTION: Performed by: PODIATRIST

## 2018-11-17 PROCEDURE — 96367 TX/PROPH/DG ADDL SEQ IV INF: CPT

## 2018-11-17 PROCEDURE — 25010000002 DAPTOMYCIN PER 1 MG: Performed by: PODIATRIST

## 2018-11-17 RX ADMIN — ERTAPENEM SODIUM 1 G: 1 INJECTION, POWDER, LYOPHILIZED, FOR SOLUTION INTRAMUSCULAR; INTRAVENOUS at 10:17

## 2018-11-17 RX ADMIN — DAPTOMYCIN 500 MG: 500 INJECTION, POWDER, LYOPHILIZED, FOR SOLUTION INTRAVENOUS at 10:50

## 2018-11-18 ENCOUNTER — HOSPITAL ENCOUNTER (OUTPATIENT)
Dept: INFUSION THERAPY | Facility: HOSPITAL | Age: 33
Setting detail: INFUSION SERIES
Discharge: HOME | End: 2018-11-18
Attending: PODIATRIST

## 2018-11-18 ENCOUNTER — APPOINTMENT (OUTPATIENT)
Dept: INFUSION THERAPY | Facility: HOSPITAL | Age: 33
End: 2018-11-18
Attending: PODIATRIST

## 2018-11-18 VITALS
SYSTOLIC BLOOD PRESSURE: 139 MMHG | DIASTOLIC BLOOD PRESSURE: 84 MMHG | RESPIRATION RATE: 17 BRPM | TEMPERATURE: 98.6 F | HEART RATE: 103 BPM

## 2018-11-18 DIAGNOSIS — E11.621 DIABETIC FOOT ULCER WITH OSTEOMYELITIS (HCC): Primary | ICD-10-CM

## 2018-11-18 DIAGNOSIS — E11.69 DIABETIC FOOT ULCER WITH OSTEOMYELITIS (HCC): Primary | ICD-10-CM

## 2018-11-18 DIAGNOSIS — L97.509 DIABETIC FOOT ULCER WITH OSTEOMYELITIS (HCC): Primary | ICD-10-CM

## 2018-11-18 DIAGNOSIS — M86.9 DIABETIC FOOT ULCER WITH OSTEOMYELITIS (HCC): Primary | ICD-10-CM

## 2018-11-18 PROCEDURE — 96365 THER/PROPH/DIAG IV INF INIT: CPT

## 2018-11-18 PROCEDURE — 25010000002 ERTAPENEM 1 GM/100ML SOLUTION: Performed by: PODIATRIST

## 2018-11-18 PROCEDURE — G0463 HOSPITAL OUTPT CLINIC VISIT: HCPCS

## 2018-11-18 PROCEDURE — 25010000002 DAPTOMYCIN PER 1 MG: Performed by: PODIATRIST

## 2018-11-18 PROCEDURE — 96367 TX/PROPH/DG ADDL SEQ IV INF: CPT

## 2018-11-18 PROCEDURE — 96366 THER/PROPH/DIAG IV INF ADDON: CPT

## 2018-11-18 RX ADMIN — DAPTOMYCIN 500 MG: 500 INJECTION, POWDER, LYOPHILIZED, FOR SOLUTION INTRAVENOUS at 10:00

## 2018-11-18 RX ADMIN — ERTAPENEM SODIUM 1 G: 1 INJECTION, POWDER, LYOPHILIZED, FOR SOLUTION INTRAMUSCULAR; INTRAVENOUS at 10:51

## 2018-11-19 ENCOUNTER — HOSPITAL ENCOUNTER (OUTPATIENT)
Dept: INFUSION THERAPY | Facility: HOSPITAL | Age: 33
Setting detail: INFUSION SERIES
Discharge: HOME | End: 2018-11-19
Attending: PODIATRIST

## 2018-11-19 VITALS
DIASTOLIC BLOOD PRESSURE: 64 MMHG | RESPIRATION RATE: 18 BRPM | HEART RATE: 91 BPM | TEMPERATURE: 97.9 F | SYSTOLIC BLOOD PRESSURE: 114 MMHG

## 2018-11-19 VITALS
HEART RATE: 91 BPM | SYSTOLIC BLOOD PRESSURE: 114 MMHG | TEMPERATURE: 97.9 F | RESPIRATION RATE: 18 BRPM | DIASTOLIC BLOOD PRESSURE: 64 MMHG

## 2018-11-19 DIAGNOSIS — E11.69 DIABETIC FOOT ULCER WITH OSTEOMYELITIS (HCC): ICD-10-CM

## 2018-11-19 DIAGNOSIS — M86.9 DIABETIC FOOT ULCER WITH OSTEOMYELITIS (HCC): Primary | ICD-10-CM

## 2018-11-19 DIAGNOSIS — M86.9 DIABETIC FOOT ULCER WITH OSTEOMYELITIS (HCC): ICD-10-CM

## 2018-11-19 DIAGNOSIS — E11.69 DIABETIC FOOT ULCER WITH OSTEOMYELITIS (HCC): Primary | ICD-10-CM

## 2018-11-19 DIAGNOSIS — E11.621 DIABETIC FOOT ULCER WITH OSTEOMYELITIS (HCC): Primary | ICD-10-CM

## 2018-11-19 DIAGNOSIS — L97.509 DIABETIC FOOT ULCER WITH OSTEOMYELITIS (HCC): ICD-10-CM

## 2018-11-19 DIAGNOSIS — E11.621 DIABETIC FOOT ULCER WITH OSTEOMYELITIS (HCC): ICD-10-CM

## 2018-11-19 DIAGNOSIS — L97.509 DIABETIC FOOT ULCER WITH OSTEOMYELITIS (HCC): Primary | ICD-10-CM

## 2018-11-19 PROCEDURE — 96367 TX/PROPH/DG ADDL SEQ IV INF: CPT

## 2018-11-19 PROCEDURE — 96365 THER/PROPH/DIAG IV INF INIT: CPT

## 2018-11-19 PROCEDURE — 25010000002 ERTAPENEM 1 GM/100ML SOLUTION: Performed by: PODIATRIST

## 2018-11-19 PROCEDURE — 25010000002 DAPTOMYCIN PER 1 MG: Performed by: PODIATRIST

## 2018-11-19 PROCEDURE — G0463 HOSPITAL OUTPT CLINIC VISIT: HCPCS

## 2018-11-19 RX ADMIN — ERTAPENEM SODIUM 1 G: 1 INJECTION, POWDER, LYOPHILIZED, FOR SOLUTION INTRAMUSCULAR; INTRAVENOUS at 14:42

## 2018-11-19 RX ADMIN — DAPTOMYCIN 500 MG: 500 INJECTION, POWDER, LYOPHILIZED, FOR SOLUTION INTRAVENOUS at 14:10

## 2018-11-20 ENCOUNTER — HOSPITAL ENCOUNTER (OUTPATIENT)
Dept: INFUSION THERAPY | Facility: HOSPITAL | Age: 33
Setting detail: INFUSION SERIES
End: 2018-11-20
Attending: PODIATRIST

## 2018-11-20 ENCOUNTER — HOSPITAL ENCOUNTER (OUTPATIENT)
Dept: INFUSION THERAPY | Facility: HOSPITAL | Age: 33
Setting detail: INFUSION SERIES
Discharge: HOME | End: 2018-11-20
Attending: PODIATRIST

## 2018-11-20 VITALS — SYSTOLIC BLOOD PRESSURE: 98 MMHG | RESPIRATION RATE: 20 BRPM | TEMPERATURE: 98.3 F | DIASTOLIC BLOOD PRESSURE: 61 MMHG

## 2018-11-20 DIAGNOSIS — E11.621 DIABETIC FOOT ULCER WITH OSTEOMYELITIS (HCC): Primary | ICD-10-CM

## 2018-11-20 DIAGNOSIS — E11.69 DIABETIC FOOT ULCER WITH OSTEOMYELITIS (HCC): Primary | ICD-10-CM

## 2018-11-20 DIAGNOSIS — L97.509 DIABETIC FOOT ULCER WITH OSTEOMYELITIS (HCC): Primary | ICD-10-CM

## 2018-11-20 DIAGNOSIS — M86.9 DIABETIC FOOT ULCER WITH OSTEOMYELITIS (HCC): Primary | ICD-10-CM

## 2018-11-20 PROCEDURE — 96365 THER/PROPH/DIAG IV INF INIT: CPT

## 2018-11-20 PROCEDURE — 25010000002 ERTAPENEM 1 GM/100ML SOLUTION: Performed by: PODIATRIST

## 2018-11-20 PROCEDURE — 96367 TX/PROPH/DG ADDL SEQ IV INF: CPT

## 2018-11-20 PROCEDURE — 25010000002 DAPTOMYCIN PER 1 MG: Performed by: PODIATRIST

## 2018-11-20 RX ADMIN — ERTAPENEM SODIUM 1 G: 1 INJECTION, POWDER, LYOPHILIZED, FOR SOLUTION INTRAMUSCULAR; INTRAVENOUS at 13:11

## 2018-11-20 RX ADMIN — DAPTOMYCIN 500 MG: 500 INJECTION, POWDER, LYOPHILIZED, FOR SOLUTION INTRAVENOUS at 13:42

## 2018-11-20 NOTE — PATIENT INSTRUCTIONS
Daptomycin injection  What is this medicine?  DAPTOMYCIN (DAP toe MYE sin) is a lipopeptide antibiotic. It is used to treat certain kinds of bacterial infections. It will not work for colds, flu, or other viral infections.  This medicine may be used for other purposes; ask your health care provider or pharmacist if you have questions.  COMMON BRAND NAME(S): Zia Lizarraga  What should I tell my health care provider before I take this medicine?  They need to know if you have any of these conditions:  -kidney disease  -an unusual or allergic reaction to daptomycin, other medicines, foods, dyes, or preservatives  -pregnant or trying to get pregnant  -breast-feeding  How should I use this medicine?  This medicine is for infusion into a vein. It is usually given by a health care professional in a hospital or clinic setting.  If you get this medicine at home, you will be taught how to prepare and give this medicine. Use exactly as directed. Take your medicine at regular intervals. Do not take your medicine more often than directed. Take all of your medicine as directed even if you think you are better. Do not skip doses or stop your medicine early.  It is important that you put your used needles and syringes in a special sharps container. Do not put them in a trash can. If you do not have a sharps container, call your pharmacist or healthcare provider to get one.  Talk to your pediatrician regarding the use of this medicine in children. While this drug may be prescribed for children as young as 1 year for selected conditions, precautions do apply.  Overdosage: If you think you have taken too much of this medicine contact a poison control center or emergency room at once.  NOTE: This medicine is only for you. Do not share this medicine with others.  What if I miss a dose?  If you miss a dose, take it as soon as you can. If it is almost time for your next dose, take only that dose. Do not take double or extra  doses.  What may interact with this medicine?  -birth control pills  -some antibiotics like tobramycin  This list may not describe all possible interactions. Give your health care provider a list of all the medicines, herbs, non-prescription drugs, or dietary supplements you use. Also tell them if you smoke, drink alcohol, or use illegal drugs. Some items may interact with your medicine.  What should I watch for while using this medicine?  Your condition will be monitored carefully while you are receiving this medicine.  Do not treat diarrhea with over the counter products. Contact your doctor if you have diarrhea that lasts more than 2 days or if it is severe and watery.  What side effects may I notice from receiving this medicine?  Side effects that you should report to your doctor or health care professional as soon as possible:  -allergic reactions like skin rash, itching or hives, swelling of the face, lips, or tongue  -breathing problems  -fever, infection  -high or low blood pressure  -muscle pain  -numb or tingling pain  -trouble passing urine or change in the amount of urine  -unusually tired or weak  -vomiting  Side effects that usually do not require medical attention (report to your doctor or health care professional if they continue or are bothersome):  -constipation or diarrhea  -trouble sleeping  -headache  -nausea  -stomach upset  This list may not describe all possible side effects. Call your doctor for medical advice about side effects. You may report side effects to FDA at 3-765-FDA-6234.  Where should I keep my medicine?  Keep out of the reach of children.  If you are using this medicine at home, you will be instructed on how to store this medicine. Throw away any unused medicine after the expiration date on the label.  NOTE: This sheet is a summary. It may not cover all possible information. If you have questions about this medicine, talk to your doctor, pharmacist, or health care provider.  ©  2018 [employer]/Gold Standard (2017-03-31 11:21:16)  Ertapenem injection  What is this medicine?  ERTAPENEM (er ta PEN em) is a carbapenem antibiotic. It is used to treat certain kinds of bacterial infections. It will not work for colds, flu, or other viral infections.  This medicine may be used for other purposes; ask your health care provider or pharmacist if you have questions.  COMMON BRAND NAME(S): Invanz  What should I tell my health care provider before I take this medicine?  They need to know if you have any of these conditions:  -brain tumor, lesion  -kidney disease  -seizure disorder  -an unusual or allergic reaction to ertapenem, other antibiotics, amide local anesthetics like lidocaine, or other medicines, foods, dyes, or preservatives  -pregnant or trying to get pregnant  -breast-feeding  How should I use this medicine?  This medicine is infused into a vein or injected deep into a muscle. It is usually given by a health care professional in a hospital or clinic setting.  If you get this medicine at home, you will be taught how to prepare and give this medicine. Use exactly as directed. Take your medicine at regular intervals. Do not take your medicine more often than directed.  It is important that you put your used needles and syringes in a special sharps container. Do not put them in a trash can. If you do not have a sharps container, call your pharmacist or healthcare provider to get one.  Talk to your pediatrician regarding the use of this medicine in children. While this drug may be prescribed for children as young as 3 months old for selected conditions, precautions do apply.  Overdosage: If you think you have taken too much of this medicine contact a poison control center or emergency room at once.  NOTE: This medicine is only for you. Do not share this medicine with others.  What if I miss a dose?  If you miss a dose, take it as soon as you can. If it is almost time for your next dose, take only  that dose. Do not take double or extra doses.  What may interact with this medicine?  -birth control pills  -probenecid  This list may not describe all possible interactions. Give your health care provider a list of all the medicines, herbs, non-prescription drugs, or dietary supplements you use. Also tell them if you smoke, drink alcohol, or use illegal drugs. Some items may interact with your medicine.  What should I watch for while using this medicine?  Tell your doctor or health care professional if your symptoms do not improve or if you get new symptoms. Your doctor will monitor your condition and blood work as needed.  Do not treat diarrhea with over the counter products. Contact your doctor if you have diarrhea that lasts more than 2 days or if it is severe and watery.  What side effects may I notice from receiving this medicine?  Side effects that you should report to your doctor or health care professional as soon as possible:  -allergic reactions like skin rash, itching or hives, swelling of the face, lips, or tongue  -anxiety, confusion, dizzy  -chest pain  -difficulty breathing, wheezing  -edema, swelling  -fever  -irregular heart rate, blood pressure  -pain or difficulty passing urine  -seizures  -unusually weak or tired  -white or red patches in mouth  Side effects that usually do not require medical attention (report to your doctor or health care professional if they continue or are bothersome):  -constipation or diarrhea  -difficulty sleeping  -headache  -nausea, vomiting  -pain, swelling or irritation where injected  -stomach upset  -vaginal itch, irritation  This list may not describe all possible side effects. Call your doctor for medical advice about side effects. You may report side effects to FDA at 8-383-FDA-7551.  Where should I keep my medicine?  Keep out of the reach of children.  You will be instructed on how to store this medicine. Throw away any unused medicine after the expiration date  on the label.  NOTE: This sheet is a summary. It may not cover all possible information. If you have questions about this medicine, talk to your doctor, pharmacist, or health care provider.  © 2018 [employer]/Gold Standard (2014-07-25 07:36:44)

## 2018-11-21 ENCOUNTER — HOSPITAL ENCOUNTER (OUTPATIENT)
Dept: INFUSION THERAPY | Facility: HOSPITAL | Age: 33
Setting detail: INFUSION SERIES
Discharge: HOME | End: 2018-11-21
Attending: PODIATRIST

## 2018-11-21 ENCOUNTER — APPOINTMENT (OUTPATIENT)
Dept: INFUSION THERAPY | Facility: HOSPITAL | Age: 33
End: 2018-11-21
Attending: PODIATRIST

## 2018-11-21 VITALS
RESPIRATION RATE: 20 BRPM | TEMPERATURE: 98.6 F | DIASTOLIC BLOOD PRESSURE: 78 MMHG | SYSTOLIC BLOOD PRESSURE: 136 MMHG | HEART RATE: 109 BPM

## 2018-11-21 DIAGNOSIS — M86.9 DIABETIC FOOT ULCER WITH OSTEOMYELITIS (HCC): Primary | ICD-10-CM

## 2018-11-21 DIAGNOSIS — E11.69 DIABETIC FOOT ULCER WITH OSTEOMYELITIS (HCC): Primary | ICD-10-CM

## 2018-11-21 DIAGNOSIS — E11.621 DIABETIC FOOT ULCER WITH OSTEOMYELITIS (HCC): Primary | ICD-10-CM

## 2018-11-21 DIAGNOSIS — L97.509 DIABETIC FOOT ULCER WITH OSTEOMYELITIS (HCC): Primary | ICD-10-CM

## 2018-11-21 PROCEDURE — 96365 THER/PROPH/DIAG IV INF INIT: CPT

## 2018-11-21 PROCEDURE — G0463 HOSPITAL OUTPT CLINIC VISIT: HCPCS

## 2018-11-21 PROCEDURE — 25010000002 DAPTOMYCIN PER 1 MG: Performed by: PODIATRIST

## 2018-11-21 RX ADMIN — DAPTOMYCIN 500 MG: 500 INJECTION, POWDER, LYOPHILIZED, FOR SOLUTION INTRAVENOUS at 15:27

## 2018-11-22 ENCOUNTER — APPOINTMENT (OUTPATIENT)
Dept: INFUSION THERAPY | Facility: HOSPITAL | Age: 33
End: 2018-11-22
Attending: PODIATRIST

## 2018-11-23 ENCOUNTER — TRANSCRIBE ORDERS (OUTPATIENT)
Dept: ADMINISTRATIVE | Facility: HOSPITAL | Age: 33
End: 2018-11-23

## 2018-11-23 ENCOUNTER — HOSPITAL ENCOUNTER (OUTPATIENT)
Dept: INFUSION THERAPY | Facility: HOSPITAL | Age: 33
Setting detail: INFUSION SERIES
Discharge: HOME | End: 2018-11-23
Attending: PODIATRIST

## 2018-11-23 VITALS
DIASTOLIC BLOOD PRESSURE: 85 MMHG | SYSTOLIC BLOOD PRESSURE: 139 MMHG | HEART RATE: 110 BPM | TEMPERATURE: 98.8 F | RESPIRATION RATE: 18 BRPM

## 2018-11-23 DIAGNOSIS — L97.509 DIABETIC FOOT ULCER WITH OSTEOMYELITIS (HCC): Primary | ICD-10-CM

## 2018-11-23 DIAGNOSIS — E11.621 DIABETIC FOOT ULCER WITH OSTEOMYELITIS (HCC): Primary | ICD-10-CM

## 2018-11-23 DIAGNOSIS — M86.9 DIABETIC FOOT ULCER WITH OSTEOMYELITIS (HCC): Primary | ICD-10-CM

## 2018-11-23 DIAGNOSIS — E11.69 DIABETIC FOOT ULCER WITH OSTEOMYELITIS (HCC): Primary | ICD-10-CM

## 2018-11-23 PROCEDURE — 25010000002 DAPTOMYCIN PER 1 MG: Performed by: PODIATRIST

## 2018-11-23 PROCEDURE — 96365 THER/PROPH/DIAG IV INF INIT: CPT

## 2018-11-23 RX ADMIN — DAPTOMYCIN 500 MG: 500 INJECTION, POWDER, LYOPHILIZED, FOR SOLUTION INTRAVENOUS at 16:25

## 2018-11-26 ENCOUNTER — TELEPHONE (OUTPATIENT)
Dept: FAMILY MEDICINE CLINIC | Facility: CLINIC | Age: 33
End: 2018-11-26

## 2018-11-27 ENCOUNTER — HOSPITAL ENCOUNTER (OUTPATIENT)
Dept: INFUSION THERAPY | Facility: HOSPITAL | Age: 33
Setting detail: INFUSION SERIES
Discharge: HOME | End: 2018-11-27
Attending: PODIATRIST

## 2018-11-27 ENCOUNTER — TRANSCRIBE ORDERS (OUTPATIENT)
Dept: INFUSION THERAPY | Facility: HOSPITAL | Age: 33
End: 2018-11-27

## 2018-11-27 VITALS
TEMPERATURE: 98.6 F | HEART RATE: 93 BPM | DIASTOLIC BLOOD PRESSURE: 69 MMHG | RESPIRATION RATE: 18 BRPM | SYSTOLIC BLOOD PRESSURE: 118 MMHG

## 2018-11-27 DIAGNOSIS — R19.7 DIARRHEA, UNSPECIFIED TYPE: Primary | ICD-10-CM

## 2018-11-27 PROCEDURE — G0463 HOSPITAL OUTPT CLINIC VISIT: HCPCS

## 2018-11-27 NOTE — TELEPHONE ENCOUNTER
She is getting the order for IV antibiotics from ortho/surgeon.  She will need to be seen since I do not write the script for her IV antiboitics.  HOwever, she may call the orthopedic surgeon and see if he wants to go ahead and order the medication and give orders for stool samples.

## 2018-11-27 NOTE — TELEPHONE ENCOUNTER
She is getting the order for IV antibiotics from ortho/surgeon.  She will need to be seen since I do not write the script for her IV antiboitics.  HOwever, she may call the orthopedic surgeon and see if he wants to go ahead and order the medication and give orders for stool samples.      Patient notified & verbalized understanding.

## 2018-11-28 ENCOUNTER — TRANSCRIBE ORDERS (OUTPATIENT)
Dept: ADMINISTRATIVE | Facility: HOSPITAL | Age: 33
End: 2018-11-28

## 2018-11-28 ENCOUNTER — APPOINTMENT (OUTPATIENT)
Dept: INFUSION THERAPY | Facility: HOSPITAL | Age: 33
End: 2018-11-28
Attending: PODIATRIST

## 2018-11-28 ENCOUNTER — LAB (OUTPATIENT)
Dept: LAB | Facility: HOSPITAL | Age: 33
End: 2018-11-28
Attending: PODIATRIST

## 2018-11-28 DIAGNOSIS — R19.7 DIARRHEA, UNSPECIFIED TYPE: ICD-10-CM

## 2018-11-28 DIAGNOSIS — R19.7 DIARRHEA, UNSPECIFIED TYPE: Primary | ICD-10-CM

## 2018-11-28 LAB
027 TOXIN: NORMAL
C DIFF TOX GENS STL QL NAA+PROBE: NEGATIVE

## 2018-11-28 PROCEDURE — 87493 C DIFF AMPLIFIED PROBE: CPT

## 2019-01-30 ENCOUNTER — OFFICE VISIT (OUTPATIENT)
Dept: FAMILY MEDICINE CLINIC | Facility: CLINIC | Age: 34
End: 2019-01-30

## 2019-01-30 VITALS
SYSTOLIC BLOOD PRESSURE: 114 MMHG | DIASTOLIC BLOOD PRESSURE: 78 MMHG | WEIGHT: 181 LBS | OXYGEN SATURATION: 98 % | BODY MASS INDEX: 30.12 KG/M2 | HEART RATE: 109 BPM | TEMPERATURE: 99.1 F

## 2019-01-30 DIAGNOSIS — E11.69 TYPE 2 DIABETES MELLITUS WITH OTHER SPECIFIED COMPLICATION, WITH LONG-TERM CURRENT USE OF INSULIN (HCC): Primary | ICD-10-CM

## 2019-01-30 DIAGNOSIS — J01.10 ACUTE NON-RECURRENT FRONTAL SINUSITIS: ICD-10-CM

## 2019-01-30 DIAGNOSIS — Z79.4 TYPE 2 DIABETES MELLITUS WITH OTHER SPECIFIED COMPLICATION, WITH LONG-TERM CURRENT USE OF INSULIN (HCC): Primary | ICD-10-CM

## 2019-01-30 PROCEDURE — 99214 OFFICE O/P EST MOD 30 MIN: CPT | Performed by: NURSE PRACTITIONER

## 2019-01-30 RX ORDER — AZITHROMYCIN 250 MG/1
TABLET, FILM COATED ORAL
Qty: 6 TABLET | Refills: 0 | Status: SHIPPED | OUTPATIENT
Start: 2019-01-30

## 2019-01-30 RX ORDER — LANCETS
EACH MISCELLANEOUS
Qty: 100 EACH | Refills: 11 | Status: SHIPPED | OUTPATIENT
Start: 2019-01-30

## 2019-01-30 RX ORDER — BLOOD-GLUCOSE METER
1 KIT MISCELLANEOUS AS NEEDED
Qty: 1 EACH | Refills: 0 | Status: SHIPPED | OUTPATIENT
Start: 2019-01-30

## 2019-01-30 NOTE — PROGRESS NOTES
Subjective   Cynthia Ríos is a 33 y.o. female.     Chief Complaint: Follow-up and Diabetes    URI    This is a new problem. The current episode started 1 to 4 weeks ago. The problem has been unchanged. The maximum temperature recorded prior to her arrival was 100.4 - 100.9 F. The fever has been present for 1 to 2 days. Associated symptoms include congestion, coughing, headaches, rhinorrhea, sinus pain and a sore throat. She has tried acetaminophen for the symptoms. The treatment provided no relief.   Diabetes   She presents for her follow-up diabetic visit. She has type 2 diabetes mellitus. Her disease course has been stable. Hypoglycemia symptoms include headaches. There are no diabetic associated symptoms. There are no hypoglycemic complications. Symptoms are stable. Current diabetic treatment includes insulin injections. She is compliant with treatment most of the time. She is currently taking insulin pre-breakfast, pre-lunch and pre-dinner. Insulin injections are given by patient. Rotation sites for injection include the abdominal wall. She is following a generally healthy diet. When asked about meal planning, she reported none. She has not had a previous visit with a dietitian. She monitors blood glucose at home 3-4 x per day. Her breakfast blood glucose range is generally >200 mg/dl. Her lunch blood glucose range is generally >200 mg/dl. She does not see a podiatrist.Eye exam is not current.      Pt is moving to [place] and she leaving [facility].  I have advised her to find a PCP as soon as possible when she gets to [place] due to her DM and uncontrolled blood sugars.     Family History   Problem Relation Age of Onset   • Diabetes Mother    • Heart disease Mother    • COPD Mother    • Atrial fibrillation Mother    • Heart failure Mother    • Liver disease Mother    • Diabetes Father        Social History     Socioeconomic History   • Marital status: Single     Spouse name: Not on file   • Number of  children: Not on file   • Years of education: Not on file   • Highest education level: Not on file   Social Needs   • Financial resource strain: Not on file   • Food insecurity - worry: Not on file   • Food insecurity - inability: Not on file   • Transportation needs - medical: Not on file   • Transportation needs - non-medical: Not on file   Occupational History   • Not on file   Tobacco Use   • Smoking status: Never Smoker   • Smokeless tobacco: Never Used   Substance and Sexual Activity   • Alcohol use: Yes     Comment: a couple a month    • Drug use: No   • Sexual activity: Defer   Other Topics Concern   • Not on file   Social History Narrative   • Not on file       Past Medical History:   Diagnosis Date   • Anemia    • Diabetes mellitus, type 2 (CMS/HCC)    • History of diabetic ulcer of foot    • History of low potassium    • History of seizures    • PCOS (polycystic ovarian syndrome)        Review of Systems   Constitutional: Negative.    HENT: Positive for congestion, rhinorrhea, sinus pain and sore throat.    Respiratory: Positive for cough.    Cardiovascular: Negative.    Gastrointestinal: Negative.    Musculoskeletal: Negative.    Skin: Negative.    Neurological: Positive for headaches.   Psychiatric/Behavioral: Negative.        Objective   Physical Exam   Constitutional: She is oriented to person, place, and time. She appears well-developed and well-nourished.   Neck: Normal range of motion. Neck supple.   Cardiovascular: Normal rate, regular rhythm and normal heart sounds.   Pulmonary/Chest: Effort normal and breath sounds normal.   Neurological: She is alert and oriented to person, place, and time.   Skin: Skin is warm and dry.   Psychiatric: She has a normal mood and affect. Her behavior is normal. Judgment and thought content normal.   Nursing note and vitals reviewed.      Procedures    Vitals: Blood pressure 114/78, pulse 109, temperature 99.1 °F (37.3 °C), temperature source Oral, weight 82.1 kg  (181 lb), SpO2 98 %, not currently breastfeeding.    Allergies: No Known Allergies     During this visit the following were done:  Labs Reviewed []    Labs Ordered []    Radiology Reports Reviewed []    Radiology Ordered []    PCP Records Reviewed []    Referring Provider Records Reviewed []    ER Records Reviewed []    Hospital Records Reviewed []    History Obtained From Family []    Radiology Images Reviewed []    Other Reviewed []    Records Requested []      Assessment/Plan   Cynthia was seen today for follow-up and diabetes.    Diagnoses and all orders for this visit:    Type 2 diabetes mellitus with other specified complication, with long-term current use of insulin (CMS/[facility])  -     Insulin Degludec (TRESIBA FLEXTOUCH) 200 UNIT/ML solution pen-injector; Inject 25 Units under the skin into the appropriate area as directed Daily.  -     Insulin Lispro (ADMELOG SOLOSTAR) 100 UNIT/ML solution pen-injector; 0-10 per sliding scale; max dose 50u per day  -     glucose monitor monitoring kit; 1 each As Needed (check blood sugar).  -     Lancets Thin misc; For tid testing  E11.9  -     glucose blood test strip; For qid testing  E11.9    Acute non-recurrent frontal sinusitis  -     azithromycin (ZITHROMAX Z-HOWIE) 250 MG tablet; Take 2 tablets the first day, then 1 tablet daily for 4 days.

## 2019-01-31 ENCOUNTER — TELEPHONE (OUTPATIENT)
Dept: FAMILY MEDICINE CLINIC | Facility: CLINIC | Age: 34
End: 2019-01-31

## 2019-01-31 NOTE — TELEPHONE ENCOUNTER
Pharmacy called reports Admelog is not covered but Novolog is ,requesting a change,ok per provider.

## 2020-03-22 DIAGNOSIS — E11.69 TYPE 2 DIABETES MELLITUS WITH OTHER SPECIFIED COMPLICATION, WITH LONG-TERM CURRENT USE OF INSULIN (HCC): ICD-10-CM

## 2020-03-22 DIAGNOSIS — Z79.4 TYPE 2 DIABETES MELLITUS WITH OTHER SPECIFIED COMPLICATION, WITH LONG-TERM CURRENT USE OF INSULIN (HCC): ICD-10-CM

## 2020-08-05 NOTE — TELEPHONE ENCOUNTER
Patient called reports she has been on long term IV & oral antibiotics since august & last week starting having diarrhea & nausea,reports she was told at the infusion clinic that she needs to have a stool checked for C-Diff. She is wanting to know if you could order that & give her something for nausea or does she need to come in?   English

## 2020-11-07 DIAGNOSIS — E11.69 TYPE 2 DIABETES MELLITUS WITH OTHER SPECIFIED COMPLICATION, WITH LONG-TERM CURRENT USE OF INSULIN (HCC): ICD-10-CM

## 2020-11-07 DIAGNOSIS — Z79.4 TYPE 2 DIABETES MELLITUS WITH OTHER SPECIFIED COMPLICATION, WITH LONG-TERM CURRENT USE OF INSULIN (HCC): ICD-10-CM

## 2020-11-10 RX ORDER — BLOOD SUGAR DIAGNOSTIC
STRIP MISCELLANEOUS
OUTPATIENT
Start: 2020-11-10

## 2022-06-15 NOTE — ED NOTES
Pt resting quietly on stretcher with no complaints.  Pt AAOx4 with no resp distress noted, respirations even and unlabored.  Pt denies any needs at this time.  Skin PWD. Will continue to monitor and follow plan of care.  Bed rails up x2, bed in lowest position, call light in reach.         Do Barrera, RN  08/08/18 0998     s/p previous PCI   ASA and statin

## (undated) DEVICE — PK EXTREM LOWR 70

## (undated) DEVICE — UNDERCAST PADDING: Brand: DEROYAL

## (undated) DEVICE — HOLDER: Brand: DEROYAL

## (undated) DEVICE — TRAP,MUCUS SPECIMEN,40CC: Brand: MEDLINE

## (undated) DEVICE — DRP C/ARM W/BAND W/CLIPS 41X74IN

## (undated) DEVICE — SUT ETHLN 3/0 PS2 18 IN 1669H

## (undated) DEVICE — UNDYED BRAIDED (POLYGLACTIN 910), SYNTHETIC ABSORBABLE SUTURE: Brand: COATED VICRYL

## (undated) DEVICE — SUT ETHLN 4/0 PS2 18IN 1667H

## (undated) DEVICE — GLV SURG TRIUMPH ORTHO W/ALOE PF LTX 8 STRL

## (undated) DEVICE — CUFF TOURNI XPRESAIR/ADH LEG STD 24X4IN

## (undated) DEVICE — VERSAJET II HYDROSURGERY SYSTEM HANDSET, 45DEG 8MM EXACT: Brand: VERSAJET

## (undated) DEVICE — BNDG ELAS ELITE V/CLOSE 6IN 5YD LF STRL

## (undated) DEVICE — ANTIBACTERIAL UNDYED BRAIDED (POLYGLACTIN 910), SYNTHETIC ABSORBABLE SUTURE: Brand: COATED VICRYL

## (undated) DEVICE — TRY SKINPREP PVP SCRB W PAINT

## (undated) DEVICE — INVIA FOAM DRESSING KIT WITH FITPAD - MEDIUMSALES NUMBER: 087.6222 3 PCS / 087.6226 15 PCS: Brand: INVIA FOAM DRESSING KIT